# Patient Record
Sex: MALE | Race: WHITE | NOT HISPANIC OR LATINO | Employment: OTHER | URBAN - METROPOLITAN AREA
[De-identification: names, ages, dates, MRNs, and addresses within clinical notes are randomized per-mention and may not be internally consistent; named-entity substitution may affect disease eponyms.]

---

## 2019-01-14 ENCOUNTER — APPOINTMENT (OUTPATIENT)
Dept: RADIOLOGY | Facility: CLINIC | Age: 64
End: 2019-01-14
Payer: COMMERCIAL

## 2019-01-14 ENCOUNTER — OFFICE VISIT (OUTPATIENT)
Dept: OBGYN CLINIC | Facility: CLINIC | Age: 64
End: 2019-01-14
Payer: COMMERCIAL

## 2019-01-14 VITALS
DIASTOLIC BLOOD PRESSURE: 70 MMHG | HEIGHT: 69 IN | BODY MASS INDEX: 31.96 KG/M2 | HEART RATE: 76 BPM | SYSTOLIC BLOOD PRESSURE: 115 MMHG | WEIGHT: 215.8 LBS

## 2019-01-14 DIAGNOSIS — M79.651 PAIN OF RIGHT THIGH: ICD-10-CM

## 2019-01-14 DIAGNOSIS — M77.42 METATARSALGIA OF LEFT FOOT: ICD-10-CM

## 2019-01-14 DIAGNOSIS — M16.11 PRIMARY OSTEOARTHRITIS OF ONE HIP, RIGHT: Primary | ICD-10-CM

## 2019-01-14 PROCEDURE — 73552 X-RAY EXAM OF FEMUR 2/>: CPT

## 2019-01-14 PROCEDURE — 99203 OFFICE O/P NEW LOW 30 MIN: CPT | Performed by: ORTHOPAEDIC SURGERY

## 2019-01-14 NOTE — PROGRESS NOTES
Assessment/Plan:  1  Primary osteoarthritis of one hip, right  Ambulatory referral to Physical Therapy    Ambulatory referral to Orthopedic Surgery   2  Pain of right thigh  CANCELED: XR femur 2 vw left   3  Metatarsalgia of left foot  Ambulatory referral to [facility] Bec[facility]    I,:   Guerita Mattson am acting as a scribe while in the presence of the attending physician :        I,:   Radha Valdez MD personally performed the services described in this documentation    as scribed in my presence :              Katherine West upon examination and review of x-rays today demonstrates severe right hip joint osteoarthritis  He does demonstrate significant restrictions range of motion on examination today  X-rays demonstrates severe arthritic change into the hip joint with large osteophyte formations the inferior aspect of the hip joint  I do believe that he will benefit from physical therapy to help regain range of motion and strength in the hip  X-rays of the knees do demonstrate patellar anchors for the quadriceps fixation  However his knee demonstrates mild arthritic change throughout  In regards to his left foot pain this does appear to be a metatarsalgia as he does demonstrate tenderness to the metatarsal heads of the 1st and 2nd toes  He is seeing were podiatry for this  I did recommend making a pressure relief pad to help alleviate painful symptoms while ambulating  I would like Katherine West to follow up with my partner Dr Kevin Plata in 6 weeks for further consult of his right hip joint osteoarthritis  Subjective:   Jamarcus Shabazz is a 59 y o  male who presents for initial evaluation of his right hip and left foot  He states that his right hip has been bothersome over last several years he notes that he is experiencing intermittent and mild to moderate sharp pain at the lateral aspect of his hip that can radiate distally into the lateral aspect of his upper leg    He does note that he has a history of bilateral quadriceps tendon repair from 2004  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   He states that if he is on his feet for long periods of time he will experience pain that radiate down the lateral aspect of his leg  He also notes sling down and position change also exacerbates symptoms into his right hip  He denies any distal paresthesias  In regards to his left foot he notes that this has been ongoing  over the last 6 weeks  He states that he is experiencing a sharp burning sensation in the plantar aspect between the great toe and 2nd toe  He notes that this is exacerbated with direct pressure or prolonged weight-bearing  He denies any numbness or tingling into his toes  Review of Systems   Constitutional: Negative for chills, fever and unexpected weight change  HENT: Negative for hearing loss, nosebleeds and sore throat  Eyes: Negative for pain, redness and visual disturbance  Respiratory: Negative for cough, shortness of breath and wheezing  Cardiovascular: Negative for chest pain, palpitations and leg swelling  Gastrointestinal: Negative for abdominal pain, nausea and vomiting  Endocrine: Negative for polyphagia and polyuria  Genitourinary: Negative for dysuria and hematuria  Musculoskeletal: Positive for arthralgias  See HPI   Skin: Negative for rash and wound  Neurological: Negative for dizziness, numbness and headaches  Psychiatric/Behavioral: Negative for decreased concentration and suicidal ideas  The patient is not nervous/anxious  No past medical history on file      Past Surgical History:   Procedure Laterality Date    REPAIR QUADRICEPS / HAMSTRING MUSCLE Bilateral 2004       Family History   Problem Relation Age of Onset    Hypertension Mother     Heart failure Mother     Stroke Mother     Diabetes Mother     Hypertension Father     Heart failure Father     Heart attack Father     No Known Problems Sister     No Known Problems Brother     No Known Problems Maternal Aunt     No Known Problems Maternal Uncle     No Known Problems Paternal Aunt     No Known Problems Paternal Uncle     No Known Problems Maternal Grandmother     No Known Problems Maternal Grandfather     No Known Problems Paternal Grandmother     No Known Problems Paternal Grandfather     ADD / ADHD Neg Hx     Anesthesia problems Neg Hx     Cancer Neg Hx     Clotting disorder Neg Hx     Collagen disease Neg Hx     Dislocations Neg Hx     Learning disabilities Neg Hx     Neurological problems Neg Hx     Osteoporosis Neg Hx     Rheumatologic disease Neg Hx     Scoliosis Neg Hx     Vascular Disease Neg Hx        Social History     Occupational History    Not on file  Social History Main Topics    Smoking status: Never Smoker    Smokeless tobacco: Never Used    Alcohol use Yes      Comment: occ  a beer every couple of weeks     Drug use: No    Sexual activity: Not on file       No current outpatient prescriptions on file  Allergies   Allergen Reactions    Aleve [Naproxen] Rash    Penicillins Rash     Reaction Date: [id number];        Objective:  Vitals:    01/14/19 1001   BP: 115/70   Pulse: 76       Right Hip Exam     Tenderness   The patient is experiencing no tenderness  Range of Motion   Flexion: 110 (Hip externally rotates with hip flexion)   Internal Rotation: 25   Right hip external rotation: -15  Abduction: 40   Adduction: 25     Muscle Strength   Abduction: 5/5   Flexion: 5/5           Observations     Additional Observation Details  No gross deformity or swelling of the foot    Tenderness     Additional Tenderness Details  Tenderness to palpation of the left 1st and 2nd metatarsal    Neurological Testing     Sensation     Ankle/Foot   Left Ankle/Foot   Intact: light touch      Physical Exam   Constitutional: He is oriented to person, place, and time   He appears well-developed and well-nourished  HENT:   Head: Normocephalic and atraumatic  Eyes: Conjunctivae are normal  Right eye exhibits no discharge  Left eye exhibits no discharge  Neck: Normal range of motion  Neck supple  Cardiovascular: Normal rate and intact distal pulses  Pulmonary/Chest: Effort normal  No respiratory distress  Musculoskeletal:   As noted in HPI   Neurological: He is alert and oriented to person, place, and time  Skin: Skin is warm and dry  Psychiatric: He has a normal mood and affect  His behavior is normal  Judgment and thought content normal    Nursing note and vitals reviewed  I have personally reviewed pertinent films in PACS and my interpretation is as follows:    X-rays of the right hip and leg demonstrates severe osteoarthritis of the hip joint  There is significant spurring demonstrated in the inferior aspect of the femoral head as well as the inferior aspect of the acetabulum  Joint line is severely narrowed throughout the joint  The right knee demonstrates quadriceps tendon repair fixation anchors into the patella with calcific density demonstrated in the area of the quadriceps tendon superior to the patella  Mild arthritic changes throughout the knee joint demonstrated

## 2019-01-23 ENCOUNTER — EVALUATION (OUTPATIENT)
Dept: PHYSICAL THERAPY | Facility: CLINIC | Age: 64
End: 2019-01-23
Payer: COMMERCIAL

## 2019-01-23 DIAGNOSIS — M16.11 PRIMARY OSTEOARTHRITIS OF ONE HIP, RIGHT: ICD-10-CM

## 2019-01-23 PROCEDURE — G8979 MOBILITY GOAL STATUS: HCPCS | Performed by: PHYSICAL THERAPIST

## 2019-01-23 PROCEDURE — 97161 PT EVAL LOW COMPLEX 20 MIN: CPT | Performed by: PHYSICAL THERAPIST

## 2019-01-23 PROCEDURE — G8978 MOBILITY CURRENT STATUS: HCPCS | Performed by: PHYSICAL THERAPIST

## 2019-01-23 NOTE — PROGRESS NOTES
PT Evaluation     Today's date: 2019  Patient name: Ginger Fitzpatrick  : 1955  MRN: [id number]  Referring provider: Susy Avina MD  Dx:   Encounter Diagnosis     ICD-10-CM    1  Primary osteoarthritis of one hip, right M16 11 Ambulatory referral to Physical Therapy                  Assessment  Assessment details: Fani Dugan with signs and symptoms consistent with Primary osteoarthritis of one hip, right, with loss of range of motion, strength and spinal stabilization  Presents with high reactivity  Ginger Fitzpatrick would benefit with physical therapy to address these impairments to return to prior level of function  Impairments: abnormal or restricted ROM, activity intolerance, impaired physical strength, lacks appropriate home exercise program, pain with function and weight-bearing intolerance    Goals  STG  Initiate HEP  Able to restore 50% ROM in 3 weeks  LTG  Independent with HEP  Restore 75% ROM in  weeks    Plan  Planned therapy interventions: joint mobilization, manual therapy, neuromuscular re-education, patient education, postural training, strengthening, stretching, therapeutic exercise, balance, gait training and home exercise program  Frequency: 2x week  Duration in visits: 12  Duration in weeks: 6  Treatment plan discussed with: patient        Subjective Evaluation    History of Present Illness  Mechanism of injury: Patient reports developing right hip pain about 7 years ago, that has gotten worse, limiting his walking ability  He is limited to about 1/2 mile walking due to hip pain    Recurrent probem    Quality of life: good    Pain  Current pain rating: 3  At best pain rating: 3  At worst pain ratin  Location: right hip  Quality: dull ache and sharp  Relieving factors: rest and change in position  Aggravating factors: walking, running and stair climbing  Progression: worsening    Social Support  Steps to enter house: yes  Stairs in house: yes   Lives in: multiple-level home  Lives with: spouse    Treatments  Current treatment: physical therapy  Patient Goals  Patient goals for therapy: decreased pain, improved balance, increased motion, increased strength, independence with ADLs/IADLs and return to sport/leisure activities          Objective     Active Range of Motion   Left Hip   Flexion: 120 degrees   Extension: 20 degrees   Abduction: 40 degrees   External rotation (90/90): 40 degrees   Internal rotation (90/90): 30 degrees     Right Hip   Flexion: 95 degrees with pain  Extension: 5 degrees with pain  Abduction: 25 degrees with pain  External rotation (90/90): 30 degrees with pain  Internal rotation (90/90): 5 degrees with pain    Strength/Myotome Testing     Left Hip   Planes of Motion   Flexion: 5  Extension: 5  Abduction: 5  External rotation: 5  Internal rotation: 5    Right Hip   Planes of Motion   Flexion: 3  Extension: 3  Abduction: 3  External rotation: 3  Internal rotation: 3-      Flowsheet Rows      Most Recent Value   PT/OT G-Codes   Current Score  46   Projected Score  61   FOTO information reviewed  Yes   Assessment Type  Evaluation   G code set  Mobility: Walking & Moving Around   Mobility: Walking and Moving Around Current Status ()  CK   Mobility: Walking and Moving Around Goal Status ()  CJ          Precautions: Standard    Daily Treatment Diary     Manual                                                                                   Exercise Diary  1/23            Isometric hip ADD Ball Squeeze 10x            Bridge 10x            SDLY Hip ABD 10x            Clam shells 10x            Reverse clamshells 10x                                                                                                                                                                                                                   Modalities

## 2019-01-29 ENCOUNTER — APPOINTMENT (OUTPATIENT)
Dept: PHYSICAL THERAPY | Facility: CLINIC | Age: 64
End: 2019-01-29
Payer: COMMERCIAL

## 2019-01-31 ENCOUNTER — OFFICE VISIT (OUTPATIENT)
Dept: PHYSICAL THERAPY | Facility: CLINIC | Age: 64
End: 2019-01-31
Payer: COMMERCIAL

## 2019-01-31 DIAGNOSIS — M16.11 PRIMARY OSTEOARTHRITIS OF ONE HIP, RIGHT: Primary | ICD-10-CM

## 2019-01-31 PROCEDURE — 97112 NEUROMUSCULAR REEDUCATION: CPT | Performed by: PHYSICAL THERAPIST

## 2019-01-31 NOTE — PROGRESS NOTES
Daily Note     Today's date: 2019  Patient name: Chichi Esquivel  : 1955  MRN: [id number]  Referring provider: Josh Chang MD  Dx:   Encounter Diagnosis     ICD-10-CM    1  Primary osteoarthritis of one hip, right M16 11                   Subjective: I am doing the HEP and feels better  Objective: See treatment diary below      Assessment: Tolerated treatment well  Patient demonstrated fatigue post treatment      Plan: Continue per plan of care          Precautions: Standard    Daily Treatment Diary     Manual                                                                                   Exercise Diary             Isometric hip ADD Ball Squeeze 10x            Bridge 10x            SDLY Hip ABD 10x            Clam shells 10x            Reverse clamshells 10x            Nustep  10 min L1           Neurac supine pelvic lift  2x5           Neurac supine bridge  2x5           Neurac SDLY Hip ABD  2x5           Neurac SDLY Hip ADD  2x5           Hip fl;exor standing stretch  5x                                                                                                                                    Modalities

## 2019-02-05 ENCOUNTER — OFFICE VISIT (OUTPATIENT)
Dept: PHYSICAL THERAPY | Facility: CLINIC | Age: 64
End: 2019-02-05
Payer: COMMERCIAL

## 2019-02-05 DIAGNOSIS — M16.11 PRIMARY OSTEOARTHRITIS OF ONE HIP, RIGHT: Primary | ICD-10-CM

## 2019-02-05 PROCEDURE — 97112 NEUROMUSCULAR REEDUCATION: CPT | Performed by: PHYSICAL THERAPIST

## 2019-02-05 PROCEDURE — 97110 THERAPEUTIC EXERCISES: CPT | Performed by: PHYSICAL THERAPIST

## 2019-02-05 NOTE — PROGRESS NOTES
Daily Note     Today's date: 2019  Patient name: Oliver Chávez  : 1955  MRN: [id number]  Referring provider: Shama Malik MD  Dx:   Encounter Diagnosis     ICD-10-CM    1  Primary osteoarthritis of one hip, right M16 11                   Subjective: My right hip is moving better, but I was very sore after a days work yesterday  Objective: See treatment diary below      Assessment: Tolerated treatment well  Patient demonstrated fatigue post treatment      Plan: Continue per plan of care        Precautions: Standard    Daily Treatment Diary     Manual              Jt Mob lat distraction   Gr4 10x          Jt mob long distraction   Gr4   10x          PB piriformis stretch   10x          Hip Circles w/ strap   2x10                           Exercise Diary            Isometric hip ADD Ball Squeeze 10x            Bridge 10x            SDLY Hip ABD 10x            Clam shells 10x            Reverse clamshells 10x            Nustep  10 min L1           Neurac supine pelvic lift  2x5 2x5          Neurac supine bridge  2x5 2x5          Neurac SDLY Hip ABD  2x5 2x5          Neurac SDLY Hip ADD  2x5 2x5          Hip fl;exor standing stretch  5x                                                                                                                                    Modalities

## 2019-02-07 ENCOUNTER — OFFICE VISIT (OUTPATIENT)
Dept: PHYSICAL THERAPY | Facility: CLINIC | Age: 64
End: 2019-02-07
Payer: COMMERCIAL

## 2019-02-07 DIAGNOSIS — M16.11 PRIMARY OSTEOARTHRITIS OF ONE HIP, RIGHT: Primary | ICD-10-CM

## 2019-02-07 PROCEDURE — 97140 MANUAL THERAPY 1/> REGIONS: CPT | Performed by: PHYSICAL THERAPIST

## 2019-02-07 PROCEDURE — 97112 NEUROMUSCULAR REEDUCATION: CPT | Performed by: PHYSICAL THERAPIST

## 2019-02-11 ENCOUNTER — OFFICE VISIT (OUTPATIENT)
Dept: PHYSICAL THERAPY | Facility: CLINIC | Age: 64
End: 2019-02-11
Payer: COMMERCIAL

## 2019-02-11 DIAGNOSIS — M16.11 PRIMARY OSTEOARTHRITIS OF ONE HIP, RIGHT: Primary | ICD-10-CM

## 2019-02-11 PROCEDURE — 97112 NEUROMUSCULAR REEDUCATION: CPT | Performed by: PHYSICAL THERAPIST

## 2019-02-11 NOTE — PROGRESS NOTES
Daily Note     Today's date: 2019  Patient name: Tomás Ortiz  : 1955  MRN: [id number]  Referring provider: Raphael Meza MD  Dx:   Encounter Diagnosis     ICD-10-CM    1  Primary osteoarthritis of one hip, right M16 11                   Subjective: My hip is moving much better  Objective: See treatment diary below      Assessment: Tolerated treatment well  Patient demonstrated fatigue post treatment and exhibited good technique with therapeutic exercises      Plan: Continue per plan of care        Precautions: Standard    Daily Treatment Diary     Manual            Jt Mob lat distraction   Gr4 10x Gr4  10x Gr 4  10x        Jt mob long distraction   Gr4   10x Gr4  10x Gr 10x        PB piriformis stretch   10x 10x         Hip Circles w/ strap   2x10 20x                          Exercise Diary          Isometric hip ADD Ball Squeeze 10x            Bridge 10x            SDLY Hip ABD 10x            Clam shells 10x            Reverse clamshells 10x            Nustep  10 min L1           Neurac supine pelvic lift  2x5 2x5 2x5 2x5        Neurac supine bridge  2x5 2x5 2x5 2x5        Neurac SDLY Hip ABD  2x5 2x5 2x5 2x5        Neurac SDLY Hip ADD  2x5 2x5 2x5 2x5        Hip fl;exor standing stretch  5x  10x                                                                                                                                  Modalities

## 2019-02-14 ENCOUNTER — OFFICE VISIT (OUTPATIENT)
Dept: PHYSICAL THERAPY | Facility: CLINIC | Age: 64
End: 2019-02-14
Payer: COMMERCIAL

## 2019-02-14 DIAGNOSIS — M16.11 PRIMARY OSTEOARTHRITIS OF ONE HIP, RIGHT: Primary | ICD-10-CM

## 2019-02-14 PROCEDURE — 97140 MANUAL THERAPY 1/> REGIONS: CPT | Performed by: PHYSICAL THERAPIST

## 2019-02-14 PROCEDURE — 97112 NEUROMUSCULAR REEDUCATION: CPT | Performed by: PHYSICAL THERAPIST

## 2019-02-14 NOTE — PROGRESS NOTES
Daily Note     Today's date: 2019  Patient name: Elyn Goodpasture  : 1955  MRN: [id number]  Referring provider: Aster Roberts MD  Dx:   Encounter Diagnosis     ICD-10-CM    1  Primary osteoarthritis of one hip, right M16 11                   Subjective: Overall feeling better  Objective: See treatment diary below      Assessment: Tolerated treatment well  Patient demonstrated fatigue post treatment and exhibited good technique with therapeutic exercises      Plan: Continue per plan of care        Precautions: Standard    Daily Treatment Diary     Manual           Jt Mob lat distraction   Gr4 10x Gr4  10x Gr 4  10x Gr 4  10x       Jt mob long distraction   Gr4   10x Gr4  10x Gr 10x Gr 4   10x       PB piriformis stretch   10x 10x         Hip Circles w/ strap   2x10 20x                          Exercise Diary         Isometric hip ADD Ball Squeeze 10x            Bridge 10x            SDLY Hip ABD 10x            Clam shells 10x            Reverse clamshells 10x            Nustep  10 min L1           Neurac supine pelvic lift  2x5 2x5 2x5 2x5 2x5       Neurac supine bridge  2x5 2x5 2x5 2x5 2x5       Neurac SDLY Hip ABD  2x5 2x5 2x5 2x5 2x5       Neurac SDLY Hip ADD  2x5 2x5 2x5 2x5 2x5       Hip fl;exor standing stretch  5x  10x                                                                                                                                  Modalities

## 2019-02-19 ENCOUNTER — OFFICE VISIT (OUTPATIENT)
Dept: PHYSICAL THERAPY | Facility: CLINIC | Age: 64
End: 2019-02-19
Payer: COMMERCIAL

## 2019-02-19 DIAGNOSIS — M16.11 PRIMARY OSTEOARTHRITIS OF ONE HIP, RIGHT: Primary | ICD-10-CM

## 2019-02-19 PROCEDURE — 97140 MANUAL THERAPY 1/> REGIONS: CPT | Performed by: PHYSICAL THERAPIST

## 2019-02-19 PROCEDURE — 97112 NEUROMUSCULAR REEDUCATION: CPT | Performed by: PHYSICAL THERAPIST

## 2019-02-19 NOTE — PROGRESS NOTES
Daily Note     Today's date: 2019  Patient name: Walter Santamaria  : 1955  MRN: [id number]  Referring provider: Rachel Mack MD  Dx:   Encounter Diagnosis     ICD-10-CM    1  Primary osteoarthritis of one hip, right M16 11                   Subjective: My hip is moving better with less pain  Objective: See treatment diary below      Assessment: Tolerated treatment well  Patient demonstrated fatigue post treatment and exhibited good technique with therapeutic exercises      Plan: Continue per plan of care        Precautions: Standard    Daily Treatment Diary     Manual          Jt Mob lat distraction   Gr4 10x Gr4  10x Gr 4  10x Gr 4  10x Gr 4 15 x      Jt mob long distraction   Gr4   10x Gr4  10x Gr 10x Gr 4   10x Gr 4   15x      PB piriformis stretch   10x 10x         Hip Circles w/ strap   2x10 20x                          Exercise Diary        Isometric hip ADD Ball Squeeze 10x            Bridge 10x            SDLY Hip ABD 10x            Clam shells 10x            Reverse clamshells 10x            Nustep  10 min L1           Neurac supine pelvic lift  2x5 2x5 2x5 2x5 2x5 2x5      Neurac supine bridge  2x5 2x5 2x5 2x5 2x5 2x5      Neurac SDLY Hip ABD  2x5 2x5 2x5 2x5 2x5 2x5      Neurac SDLY Hip ADD  2x5 2x5 2x5 2x5 2x5 2x5      Hip fl;exor standing stretch  5x  10x                                                                                                                                  Modalities

## 2019-02-21 ENCOUNTER — OFFICE VISIT (OUTPATIENT)
Dept: PHYSICAL THERAPY | Facility: CLINIC | Age: 64
End: 2019-02-21
Payer: COMMERCIAL

## 2019-02-21 DIAGNOSIS — M16.11 PRIMARY OSTEOARTHRITIS OF ONE HIP, RIGHT: Primary | ICD-10-CM

## 2019-02-21 PROCEDURE — 97112 NEUROMUSCULAR REEDUCATION: CPT | Performed by: PHYSICAL THERAPIST

## 2019-02-21 PROCEDURE — 97140 MANUAL THERAPY 1/> REGIONS: CPT | Performed by: PHYSICAL THERAPIST

## 2019-02-21 NOTE — PROGRESS NOTES
PT Re-Evaluation     Today's date: 2019  Patient name: Sage Jacome  : 1955  MRN: [id number]  Referring provider: Melani Tubbs MD  Dx:   Encounter Diagnosis     ICD-10-CM    1  Primary osteoarthritis of one hip, right M16 11                   Assessment  Assessment details: The patient has shown good subjective progress with less pain and more active in daily routine  There has been no change of the AROM of the right hip which remains severely limited  He is benefiting with manual stretching and [place] therapy, I recommend that he continue with the present PT  Plan  Planned therapy interventions: joint mobilization, manual therapy, neuromuscular re-education, patient education, strengthening, stretching, therapeutic exercise and home exercise program  Frequency: 2x week  Duration in visits: 12  Duration in weeks: 6  Treatment plan discussed with: patient        Subjective Evaluation    History of Present Illness  Mechanism of injury: I have less pain, moving better          Objective     Active Range of Motion   Left Hip   Flexion: 120 degrees   Extension: 15 degrees   Abduction: 40 degrees   External rotation (90/90): 35 degrees   Internal rotation (90/90): 25 degrees     Right Hip   Flexion: 95 degrees   Extension: 5 degrees   Abduction: 20 degrees   External rotation (90/90): 15 degrees   Internal rotation (90/90): 0 degrees       Flowsheet Rows      Most Recent Value   PT/OT G-Codes   Current Score  58   Projected Score  61          Precautions: Standard    Daily Treatment Diary     Manual         Jt Mob lat distraction   Gr4 10x Gr4  10x Gr 4  10x Gr 4  10x Gr 4 15 x Gr4  15x     Jt mob long distraction   Gr4   10x Gr4  10x Gr 10x Gr 4   10x Gr 4   15x Gr4  15x     PB piriformis stretch   10x 10x         Hip Circles w/ strap   2x10 20x                          Exercise Diary       Isometric hip ADD Ball Squeeze 10x Bridge 10x            SDLY Hip ABD 10x            Clam shells 10x            Reverse clamshells 10x            Nustep  10 min L1           Neurac supine pelvic lift  2x5 2x5 2x5 2x5 2x5 2x5 2x5     Neurac supine bridge  2x5 2x5 2x5 2x5 2x5 2x5 2x5     Neurac SDLY Hip ABD  2x5 2x5 2x5 2x5 2x5 2x5 2x5     Neurac SDLY Hip ADD  2x5 2x5 2x5 2x5 2x5 2x5 2x5     Hip fl;exor standing stretch  5x  10x                                                                                                                                  Modalities

## 2019-02-26 ENCOUNTER — OFFICE VISIT (OUTPATIENT)
Dept: PHYSICAL THERAPY | Facility: CLINIC | Age: 64
End: 2019-02-26
Payer: COMMERCIAL

## 2019-02-26 DIAGNOSIS — M16.11 PRIMARY OSTEOARTHRITIS OF ONE HIP, RIGHT: Primary | ICD-10-CM

## 2019-02-26 PROCEDURE — 97140 MANUAL THERAPY 1/> REGIONS: CPT | Performed by: PHYSICAL THERAPIST

## 2019-02-26 PROCEDURE — 97112 NEUROMUSCULAR REEDUCATION: CPT | Performed by: PHYSICAL THERAPIST

## 2019-02-26 NOTE — PROGRESS NOTES
Daily Note     Today's date: 2019  Patient name: Chichi Esquivel  : 1955  MRN: [id number]  Referring provider: Josh Chang MD  Dx:   Encounter Diagnosis     ICD-10-CM    1  Primary osteoarthritis of one hip, right M16 11                   Subjective: My hip is feeling better with [place], I have returned to bike riding without pain  Objective: See treatment diary below      Assessment: Tolerated treatment well  Patient demonstrated fatigue post treatment and exhibited good technique with therapeutic exercises      Plan: Continue per plan of care          Precautions: Standard    Daily Treatment Diary     Manual        Jt Mob lat distraction   Gr4 10x Gr4  10x Gr 4  10x Gr 4  10x Gr 4 15 x Gr4  15x Gr4  15x    Jt mob long distraction   Gr4   10x Gr4  10x Gr 10x Gr 4   10x Gr 4   15x Gr4  15x Gr4  15x    PB piriformis stretch   10x 10x         Hip Circles w/ strap   2x10 20x                          Exercise Diary      Isometric hip ADD Ball Squeeze 10x            Bridge 10x            SDLY Hip ABD 10x            Clam shells 10x            Reverse clamshells 10x            Nustep  10 min L1           Neurac supine pelvic lift  2x5 2x5 2x5 2x5 2x5 2x5 2x5 2x5    Neurac supine bridge  2x5 2x5 2x5 2x5 2x5 2x5 2x5 2x5    Neurac SDLY Hip ABD  2x5 2x5 2x5 2x5 2x5 2x5 2x5 2x5    Neurac SDLY Hip ADD  2x5 2x5 2x5 2x5 2x5 2x5 2x5 2x5    Hip fl;exor standing stretch  5x  10x                                                                                                                                  Modalities

## 2019-02-27 ENCOUNTER — APPOINTMENT (OUTPATIENT)
Dept: RADIOLOGY | Facility: CLINIC | Age: 64
End: 2019-02-27
Payer: COMMERCIAL

## 2019-02-27 ENCOUNTER — OFFICE VISIT (OUTPATIENT)
Dept: OBGYN CLINIC | Facility: CLINIC | Age: 64
End: 2019-02-27
Payer: COMMERCIAL

## 2019-02-27 VITALS
DIASTOLIC BLOOD PRESSURE: 75 MMHG | BODY MASS INDEX: 32.53 KG/M2 | HEART RATE: 75 BPM | WEIGHT: 219.6 LBS | HEIGHT: 69 IN | SYSTOLIC BLOOD PRESSURE: 126 MMHG

## 2019-02-27 DIAGNOSIS — M25.551 RIGHT HIP PAIN: ICD-10-CM

## 2019-02-27 DIAGNOSIS — M16.11 PRIMARY OSTEOARTHRITIS OF ONE HIP, RIGHT: ICD-10-CM

## 2019-02-27 DIAGNOSIS — M16.11 PRIMARY OSTEOARTHRITIS OF ONE HIP, RIGHT: Primary | ICD-10-CM

## 2019-02-27 PROCEDURE — 99213 OFFICE O/P EST LOW 20 MIN: CPT | Performed by: ORTHOPAEDIC SURGERY

## 2019-02-27 PROCEDURE — 73502 X-RAY EXAM HIP UNI 2-3 VIEWS: CPT

## 2019-02-27 NOTE — PROGRESS NOTES
Assessment/Plan:  1  Primary osteoarthritis of one hip, right  Ambulatory referral to Orthopedic Surgery    XR hip/pelv 2-3 vws right if performed   2  Right hip pain       Tori Munoz is a very pleasant 59year old gentleman presenting with activity related right hip pain  After reviewing his images, exam, and history, he does seem to be slightly symptomatic of his severe end-stage right hip osteoarthritis  We explained that ultimately, he may require a right total hip arthroplasty, and based on his medical history and body habitus, he would be a good candidate for the anterior approach  He will likely need surgery sooner rather than later given severe changes, limitations in motion, obligatory external rotation with flexion, and slight leg length discrepancy on the right  However, he seems to be doing very well with physical therapy and his home exercise program over the past 6 weeks  We explained that a hip replacement is an elective procedure and only will become necessary when his symptoms prevent him from performing activities of daily living are activities of enjoyment  He did express understanding of this  We did also briefly discussed that he would be a candidate for a cortisone injection, but given the severity of his disease he would likely not see significant relief  Therefore, we have encouraged him to continue with analgesics as needed and his home exercise program   He can return to our office on an as-needed basis when these conservative treatments stop providing him sufficient relief  He understands that when he does return, we will likely recommend pursuing the total hip arthroplasty  All of his questions were addressed today  Subjective: Right hip pain    Patient ID: Oliver Chávez is a 59 y o  male  Tori Munoz is a pleasant 26-year-old gentleman presenting today on referral from our colleague, Dr Lesly Jalloh, for subspecialty of joint replacement and reconstruction    He reports that he has had worsening right hip and lateral leg pain over the past 5-6 years  He denies any history of injury or surgery to the right hip  He did work as a  for 32 years and was an avid runner for 30 years  He does have a history of bilateral quadriceps tendon repairs over 14 years ago, and did not have any significant complications  Over the past year, he has noted worsening pain in his right groin and lateral hip radiating down towards his knee with his activities  He did see Dr Mary Estes recently and was referred to physical therapy, which she has been doing for the past 6 weeks  He reports that he actually has seen significant improvement in his symptoms overall  He continues to have limited range of motion of the right hip, but his pain has greatly diminished  He denies any back pain or paresthesias in the right lower extremity  He has never had any cortisone injections and has not use any ambulatory assistive devices for his hip  He does have difficulty with stairs and putting on his shoes and socks in the morning  He does not have diabetes in is not a smoker  Review of Systems   Constitutional: Negative  HENT: Negative  Eyes: Negative  Respiratory: Negative  Cardiovascular: Negative  Gastrointestinal: Negative  Endocrine: Negative  Genitourinary: Negative  Musculoskeletal: Positive for arthralgias, joint swelling and myalgias  Skin: Negative  Allergic/Immunologic: Negative  Neurological: Negative  Hematological: Negative  Psychiatric/Behavioral: Negative  History reviewed  No pertinent past medical history      Past Surgical History:   Procedure Laterality Date    REPAIR QUADRICEPS / HAMSTRING MUSCLE Bilateral 2004       Family History   Problem Relation Age of Onset    Hypertension Mother     Heart failure Mother     Stroke Mother     Diabetes Mother     Hypertension Father     Heart failure Father     Heart attack Father     No Known Problems Sister     No Known Problems Brother     No Known Problems Maternal Aunt     No Known Problems Maternal Uncle     No Known Problems Paternal Aunt     No Known Problems Paternal Uncle     No Known Problems Maternal Grandmother     No Known Problems Maternal Grandfather     No Known Problems Paternal Grandmother     No Known Problems Paternal Grandfather     ADD / ADHD Neg Hx     Anesthesia problems Neg Hx     Cancer Neg Hx     Clotting disorder Neg Hx     Collagen disease Neg Hx     Dislocations Neg Hx     Learning disabilities Neg Hx     Neurological problems Neg Hx     Osteoporosis Neg Hx     Rheumatologic disease Neg Hx     Scoliosis Neg Hx     Vascular Disease Neg Hx        Social History     Occupational History    Not on file   Tobacco Use    Smoking status: Never Smoker    Smokeless tobacco: Never Used   Substance and Sexual Activity    Alcohol use: Yes     Comment: occ  a beer every couple of weeks     Drug use: No    Sexual activity: Not on file       No current outpatient medications on file  Allergies   Allergen Reactions    Aleve [Naproxen] Rash    Penicillins Rash     Reaction Date: [id number];        Objective:  Vitals:    02/27/19 1011   BP: 126/75   Pulse: 75       Body mass index is 32 43 kg/m²  Right Hip Exam     Tenderness   The patient is experiencing no tenderness       Range of Motion   Abduction:  30 abnormal   Adduction:  10 abnormal   Flexion:  100 (obligatory external rotation with hip flexion) abnormal   External rotation:  30 abnormal   Internal rotation:  0 (-5) abnormal     Muscle Strength   Abduction: 5/5   Adduction: 5/5   Flexion: 5/5     Tests   LASHELL: positive  Emily: negative    Other   Erythema: absent  Scars: absent  Sensation: normal  Pulse: present    Comments:  Positive Kadi log roll, LASHELL  Mild leg length discrepancy  Thigh and calf soft and non tender  Grossly NVI  Ambulates with slightly antalgic gait on the right          Observations     Additional Observation Details  No gross deformity or swelling of the foot    Tenderness     Additional Tenderness Details  Tenderness to palpation of the left 1st and 2nd metatarsal    Neurological Testing     Sensation     Ankle/Foot   Left Ankle/Foot   Intact: light touch      Physical Exam   Constitutional: He is oriented to person, place, and time  He appears well-developed and well-nourished  Body mass index is 32 43 kg/m²  HENT:   Head: Normocephalic and atraumatic  Eyes: EOM are normal    Glasses    Neck: Normal range of motion  Cardiovascular: Intact distal pulses  Pulmonary/Chest: Effort normal    Musculoskeletal:   See ortho exam   Neurological: He is alert and oriented to person, place, and time  Skin: Skin is warm and dry  Psychiatric: He has a normal mood and affect  His behavior is normal  Judgment and thought content normal        I have personally reviewed pertinent films in PACS of the weight bearing x-rays taken today of his right hip which demonstrate end stage degenerative changes with complete joint space loss, flattening of the femoral head and neck, large osteophytes, sclerosis, and subchonral cysts  There is no fracture, dislocation, or avascular necrosis

## 2019-02-28 ENCOUNTER — OFFICE VISIT (OUTPATIENT)
Dept: PHYSICAL THERAPY | Facility: CLINIC | Age: 64
End: 2019-02-28
Payer: COMMERCIAL

## 2019-02-28 DIAGNOSIS — M16.11 PRIMARY OSTEOARTHRITIS OF ONE HIP, RIGHT: Primary | ICD-10-CM

## 2019-02-28 PROCEDURE — 97140 MANUAL THERAPY 1/> REGIONS: CPT | Performed by: PHYSICAL THERAPIST

## 2019-02-28 PROCEDURE — 97112 NEUROMUSCULAR REEDUCATION: CPT | Performed by: PHYSICAL THERAPIST

## 2019-02-28 NOTE — PROGRESS NOTES
PT Re-Evaluation     Today's date: 2019  Patient name: Va Ward  : 1955  MRN: [id number]  Referring provider: Lesly Bryant MD  Dx:   Encounter Diagnosis     ICD-10-CM    1  Primary osteoarthritis of one hip, right M16 11                   Assessment  Assessment details: The patient remains with severe limitation of ROM, due to OA  However, he is functioning at a higher level with the [facility] Therapy  He is benefitting with neaurac therapy and would benefit with continued PT to progress right hip function  Impairments: abnormal or restricted ROM, activity intolerance, impaired balance, impaired physical strength, lacks appropriate home exercise program and pain with function    Goals  STG  Able to walk 1/2 mile on level ground without pain in 3 weeks  Able to climb 1 flight of stairs without pain in 3 weeks  LTG  Able to walk 1 mile on level ground without pain in 6 weeks  Able to climb 2 flight of stairs without pain in 6 weeks  Plan  Planned therapy interventions: manual therapy, joint mobilization, neuromuscular re-education, postural training, patient education, strengthening, stretching, functional ROM exercises and gait training  Frequency: 2x week  Duration in visits: 12  Duration in weeks: 6  Treatment plan discussed with: patient        Subjective Evaluation    History of Present Illness  Mechanism of injury: I saw Dr Sangeeta Becerril and felt that I do not need a jt replacement at this time    Pain  Current pain ratin  At best pain ratin  At worst pain ratin          Objective     Active Range of Motion   Left Hip   Flexion: 120 degrees   Abduction: 35 degrees   External rotation (90/90): 35 degrees   Internal rotation (90/90): 20 degrees     Right Hip   Flexion: 90 degrees   Abduction: 15 degrees   External rotation (90/90): 15 degrees   Internal rotation (90/90): 0 degrees     Strength/Myotome Testing     Left Hip   Planes of Motion   Flexion: 5  Extension: 5  Abduction: 5  External rotation: 5  Internal rotation: 5    Right Hip   Planes of Motion   Flexion: 4  Extension: 3+  Abduction: 4-  External rotation: 3-  Internal rotation: 3-        Precautions: Standard    Daily Treatment Diary     Manual    2/5 2/7 2/11 2/14 2/19 2/21 2/26 2/28   Jt Mob lat distraction   Gr4 10x Gr4  10x Gr 4  10x Gr 4  10x Gr 4 15 x Gr4  15x Gr4  15x Gr 4  15x   Jt mob long distraction   Gr4   10x Gr4  10x Gr 10x Gr 4   10x Gr 4   15x Gr4  15x Gr4  15x Gr 4  15x   PB piriformis stretch   10x 10x         Hip Circles w/ strap   2x10 20x                          Exercise Diary  1/23 1/31 2/5 2/7 2/11 2/14 2/19 2/21 2/26 2/28   Isometric hip ADD Ball Squeeze 10x            Bridge 10x            SDLY Hip ABD 10x            Clam shells 10x            Reverse clamshells 10x            Nustep  10 min L1           Neurac supine pelvic lift  2x5 2x5 2x5 2x5 2x5 2x5 2x5 2x5 2x5   Neurac supine bridge  2x5 2x5 2x5 2x5 2x5 2x5 2x5 2x5 2x5   Neurac SDLY Hip ABD  2x5 2x5 2x5 2x5 2x5 2x5 2x5 2x5 2x5   Neurac SDLY Hip ADD  2x5 2x5 2x5 2x5 2x5 2x5 2x5 2x5 2x5   Hip fl;exor standing stretch  5x  10x                                                                                                                                  Modalities

## 2019-03-05 ENCOUNTER — OFFICE VISIT (OUTPATIENT)
Dept: PHYSICAL THERAPY | Facility: CLINIC | Age: 64
End: 2019-03-05
Payer: COMMERCIAL

## 2019-03-05 DIAGNOSIS — M16.11 PRIMARY OSTEOARTHRITIS OF ONE HIP, RIGHT: Primary | ICD-10-CM

## 2019-03-05 PROCEDURE — 97140 MANUAL THERAPY 1/> REGIONS: CPT | Performed by: PHYSICAL THERAPIST

## 2019-03-05 PROCEDURE — 97112 NEUROMUSCULAR REEDUCATION: CPT | Performed by: PHYSICAL THERAPIST

## 2019-03-05 NOTE — PROGRESS NOTES
Daily Note     Today's date: 3/5/2019  Patient name: Ramírez Rascon  : 1955  MRN: [id number]  Referring provider: Nabil Kingsley MD  Dx:   Encounter Diagnosis     ICD-10-CM    1  Primary osteoarthritis of one hip, right M16 11                   Subjective: My hip is sore after snow shovelling      Objective: See treatment diary below      Assessment: Tolerated treatment well  Patient demonstrated fatigue post treatment and exhibited good technique with therapeutic exercises      Plan: Continue per plan of care      Precautions OA Hip    Neurac Daily Treatment Diary     Manual  3/       Jt Mob lateral distraction 10x                                           Exercise Diary  3/5       Supine Pelvic Tilt 2x5       Supine Bridging 2x5       Prone Bridging        Side-lying Hip Abduction 2x5       Side-lying Hip Adduction 2x5                                           Modalities

## 2019-03-07 ENCOUNTER — OFFICE VISIT (OUTPATIENT)
Dept: PHYSICAL THERAPY | Facility: CLINIC | Age: 64
End: 2019-03-07
Payer: COMMERCIAL

## 2019-03-07 DIAGNOSIS — M16.11 PRIMARY OSTEOARTHRITIS OF ONE HIP, RIGHT: Primary | ICD-10-CM

## 2019-03-07 PROCEDURE — G8979 MOBILITY GOAL STATUS: HCPCS | Performed by: PHYSICAL THERAPIST

## 2019-03-07 PROCEDURE — G8980 MOBILITY D/C STATUS: HCPCS | Performed by: PHYSICAL THERAPIST

## 2019-03-07 PROCEDURE — 97112 NEUROMUSCULAR REEDUCATION: CPT | Performed by: PHYSICAL THERAPIST

## 2019-03-07 PROCEDURE — 97140 MANUAL THERAPY 1/> REGIONS: CPT | Performed by: PHYSICAL THERAPIST

## 2019-03-07 NOTE — PROGRESS NOTES
Daily Note     Today's date: 3/7/2019  Patient name: Walter Santamaria  : 1955  MRN: [id number]  Referring provider: Rachel Mack MD  Dx:   Encounter Diagnosis     ICD-10-CM    1  Primary osteoarthritis of one hip, right M16 11                   Subjective: My right hip feels good  Objective: See treatment diary below      Assessment: Tolerated treatment well  Patient demonstrated fatigue post treatment and exhibited good technique with therapeutic exercises      Plan: Continue per plan of care        Neurac Daily Treatment Diary     Manual  3/5 3/5      Jt Mob lateral distraction 10x 10x                                          Exercise Diary  3/5 3/7      Supine Pelvic Tilt 2x5 2x5      Supine Bridging 2x5 2x5      Prone Bridging        Side-lying Hip Abduction 2x5 2x5      Side-lying Hip Adduction 2x5 2x5                                          Modalities

## 2019-03-12 ENCOUNTER — OFFICE VISIT (OUTPATIENT)
Dept: PHYSICAL THERAPY | Facility: CLINIC | Age: 64
End: 2019-03-12
Payer: COMMERCIAL

## 2019-03-12 DIAGNOSIS — M16.11 PRIMARY OSTEOARTHRITIS OF ONE HIP, RIGHT: Primary | ICD-10-CM

## 2019-03-12 PROCEDURE — 97112 NEUROMUSCULAR REEDUCATION: CPT | Performed by: PHYSICAL THERAPIST

## 2019-03-12 NOTE — PROGRESS NOTES
Daily Note     Today's date: 3/12/2019  Patient name: Dyan Brown  : 1955  MRN: [id number]  Referring provider: Derek Briceño MD  Dx:   Encounter Diagnosis     ICD-10-CM    1  Primary osteoarthritis of one hip, right M16 11                   Subjective: My hip feels great after redcord      Objective: See treatment diary below      Assessment: Tolerated treatment well  Patient demonstrated fatigue post treatment and exhibited good technique with therapeutic exercises      Plan: Continue per plan of care  n: Continue per plan of care        Neurac Daily Treatment Diary     Manual  3/5 3/5      Jt Mob lateral distraction 10x 10x                                          Exercise Diary  3/5 3/7 3/12     Supine Pelvic Tilt 2x5 2x5 2x5     Supine Bridging 2x5 2x5 2x5     Prone Bridging        Side-lying Hip Abduction 2x5 2x5 2x5     Side-lying Hip Adduction 2x5 2x5 2x5                                         Modalities

## 2019-03-14 ENCOUNTER — APPOINTMENT (OUTPATIENT)
Dept: PHYSICAL THERAPY | Facility: CLINIC | Age: 64
End: 2019-03-14
Payer: COMMERCIAL

## 2019-03-19 ENCOUNTER — APPOINTMENT (OUTPATIENT)
Dept: PHYSICAL THERAPY | Facility: CLINIC | Age: 64
End: 2019-03-19
Payer: COMMERCIAL

## 2019-03-20 ENCOUNTER — OFFICE VISIT (OUTPATIENT)
Dept: PHYSICAL THERAPY | Facility: CLINIC | Age: 64
End: 2019-03-20
Payer: COMMERCIAL

## 2019-03-20 DIAGNOSIS — M16.11 PRIMARY OSTEOARTHRITIS OF ONE HIP, RIGHT: Primary | ICD-10-CM

## 2019-03-20 PROCEDURE — 97112 NEUROMUSCULAR REEDUCATION: CPT | Performed by: PHYSICAL THERAPIST

## 2019-03-20 NOTE — PROGRESS NOTES
Daily Note     Today's date: 3/20/2019  Patient name: Joy Guerrero  : 1955  MRN: [id number]  Referring provider: Christina Castro MD  Dx:   Encounter Diagnosis     ICD-10-CM    1  Primary osteoarthritis of one hip, right M16 11                   Subjective: My hip is sore, yesterday I was climbing ladders with sheet rock  Objective: See treatment diary below      Assessment: Tolerated treatment well  Patient demonstrated fatigue post treatment and exhibited good technique with therapeutic exercises      Plan: Continue per plan of care        Neurac Daily Treatment Diary     Manual  3/5 3/5      Jt Mob lateral distraction 10x 10x                                          Exercise Diary  3/5 3/7 3/12 3/20    Supine Pelvic Tilt 2x5 2x5 2x5 2x5    Supine Bridging 2x5 2x5 2x5 2x5    Prone Bridging        Side-lying Hip Abduction 2x5 2x5 2x5 2x5    Side-lying Hip Adduction 2x5 2x5 2x5 2x5                                        Modalities

## 2019-03-21 ENCOUNTER — APPOINTMENT (OUTPATIENT)
Dept: PHYSICAL THERAPY | Facility: CLINIC | Age: 64
End: 2019-03-21
Payer: COMMERCIAL

## 2019-03-27 ENCOUNTER — OFFICE VISIT (OUTPATIENT)
Dept: PHYSICAL THERAPY | Facility: CLINIC | Age: 64
End: 2019-03-27
Payer: COMMERCIAL

## 2019-03-27 DIAGNOSIS — M16.11 PRIMARY OSTEOARTHRITIS OF ONE HIP, RIGHT: Primary | ICD-10-CM

## 2019-03-27 PROCEDURE — 97112 NEUROMUSCULAR REEDUCATION: CPT | Performed by: PHYSICAL THERAPIST

## 2019-03-27 NOTE — PROGRESS NOTES
Daily Note     Today's date: 3/27/2019  Patient name: Morena Hampton  : 1955  MRN: [id number]  Referring provider: Ara Camejo MD  Dx:   Encounter Diagnosis     ICD-10-CM    1  Primary osteoarthritis of one hip, right M16 11                   Subjective: Less pain with redcord, moving better  Objective: See treatment diary below      Assessment: Tolerated treatment well  Patient demonstrated fatigue post treatment and exhibited good technique with therapeutic exercises      Plan: Continue per plan of care        Neurac Daily Treatment Diary     Manual  3/5 3/5      Jt Mob lateral distraction 10x 10x                                          Exercise Diary  3/5 3/7 3/12 3/20 3/27   Supine Pelvic Tilt 2x5 2x5 2x5 2x5 2x5   Supine Bridging 2x5 2x5 2x5 2x5 2x5   Prone Bridging        Side-lying Hip Abduction 2x5 2x5 2x5 2x5 2x5   Side-lying Hip Adduction 2x5 2x5 2x5 2x5 2x5                                       Modalities

## 2019-04-02 ENCOUNTER — OFFICE VISIT (OUTPATIENT)
Dept: PHYSICAL THERAPY | Facility: CLINIC | Age: 64
End: 2019-04-02
Payer: COMMERCIAL

## 2019-04-02 DIAGNOSIS — M16.11 PRIMARY OSTEOARTHRITIS OF ONE HIP, RIGHT: Primary | ICD-10-CM

## 2019-04-02 PROCEDURE — 97112 NEUROMUSCULAR REEDUCATION: CPT | Performed by: PHYSICAL THERAPIST

## 2019-04-10 ENCOUNTER — OFFICE VISIT (OUTPATIENT)
Dept: PHYSICAL THERAPY | Facility: CLINIC | Age: 64
End: 2019-04-10
Payer: COMMERCIAL

## 2019-04-10 DIAGNOSIS — M16.11 PRIMARY OSTEOARTHRITIS OF ONE HIP, RIGHT: Primary | ICD-10-CM

## 2019-04-10 PROCEDURE — 97112 NEUROMUSCULAR REEDUCATION: CPT | Performed by: PHYSICAL THERAPIST

## 2019-04-17 ENCOUNTER — OFFICE VISIT (OUTPATIENT)
Dept: PHYSICAL THERAPY | Facility: CLINIC | Age: 64
End: 2019-04-17
Payer: COMMERCIAL

## 2019-04-17 DIAGNOSIS — M16.11 PRIMARY OSTEOARTHRITIS OF ONE HIP, RIGHT: Primary | ICD-10-CM

## 2019-04-17 PROCEDURE — 97112 NEUROMUSCULAR REEDUCATION: CPT | Performed by: PHYSICAL THERAPIST

## 2019-04-24 ENCOUNTER — OFFICE VISIT (OUTPATIENT)
Dept: PHYSICAL THERAPY | Facility: CLINIC | Age: 64
End: 2019-04-24
Payer: COMMERCIAL

## 2019-04-24 DIAGNOSIS — M16.11 PRIMARY OSTEOARTHRITIS OF ONE HIP, RIGHT: Primary | ICD-10-CM

## 2019-04-24 PROCEDURE — 97112 NEUROMUSCULAR REEDUCATION: CPT | Performed by: PHYSICAL THERAPIST

## 2019-04-24 PROCEDURE — 97110 THERAPEUTIC EXERCISES: CPT | Performed by: PHYSICAL THERAPIST

## 2019-05-01 ENCOUNTER — OFFICE VISIT (OUTPATIENT)
Dept: PHYSICAL THERAPY | Facility: CLINIC | Age: 64
End: 2019-05-01
Payer: COMMERCIAL

## 2019-05-01 DIAGNOSIS — M16.11 PRIMARY OSTEOARTHRITIS OF ONE HIP, RIGHT: Primary | ICD-10-CM

## 2019-05-01 PROCEDURE — 97112 NEUROMUSCULAR REEDUCATION: CPT | Performed by: PHYSICAL THERAPIST

## 2019-05-08 ENCOUNTER — OFFICE VISIT (OUTPATIENT)
Dept: PHYSICAL THERAPY | Facility: CLINIC | Age: 64
End: 2019-05-08
Payer: COMMERCIAL

## 2019-05-08 DIAGNOSIS — M16.11 PRIMARY OSTEOARTHRITIS OF ONE HIP, RIGHT: Primary | ICD-10-CM

## 2019-05-08 PROCEDURE — 97112 NEUROMUSCULAR REEDUCATION: CPT | Performed by: PHYSICAL THERAPIST

## 2019-05-15 ENCOUNTER — OFFICE VISIT (OUTPATIENT)
Dept: PHYSICAL THERAPY | Facility: CLINIC | Age: 64
End: 2019-05-15
Payer: COMMERCIAL

## 2019-05-15 DIAGNOSIS — M16.11 PRIMARY OSTEOARTHRITIS OF ONE HIP, RIGHT: Primary | ICD-10-CM

## 2019-05-15 PROCEDURE — 97112 NEUROMUSCULAR REEDUCATION: CPT | Performed by: PHYSICAL THERAPIST

## 2019-05-22 ENCOUNTER — OFFICE VISIT (OUTPATIENT)
Dept: PHYSICAL THERAPY | Facility: CLINIC | Age: 64
End: 2019-05-22
Payer: COMMERCIAL

## 2019-05-22 DIAGNOSIS — M16.11 PRIMARY OSTEOARTHRITIS OF ONE HIP, RIGHT: Primary | ICD-10-CM

## 2019-05-22 PROCEDURE — 97112 NEUROMUSCULAR REEDUCATION: CPT | Performed by: PHYSICAL THERAPIST

## 2019-05-29 ENCOUNTER — OFFICE VISIT (OUTPATIENT)
Dept: PHYSICAL THERAPY | Facility: CLINIC | Age: 64
End: 2019-05-29
Payer: COMMERCIAL

## 2019-05-29 DIAGNOSIS — M16.11 PRIMARY OSTEOARTHRITIS OF ONE HIP, RIGHT: Primary | ICD-10-CM

## 2019-05-29 PROCEDURE — 97112 NEUROMUSCULAR REEDUCATION: CPT | Performed by: PHYSICAL THERAPIST

## 2019-09-12 ENCOUNTER — TELEPHONE (OUTPATIENT)
Dept: FAMILY MEDICINE CLINIC | Facility: CLINIC | Age: 64
End: 2019-09-12

## 2019-09-12 ENCOUNTER — HOSPITAL ENCOUNTER (EMERGENCY)
Facility: HOSPITAL | Age: 64
Discharge: HOME | End: 2019-09-12
Attending: EMERGENCY MEDICINE | Admitting: EMERGENCY MEDICINE
Payer: COMMERCIAL

## 2019-09-12 VITALS
HEART RATE: 70 BPM | WEIGHT: 210.32 LBS | OXYGEN SATURATION: 98 % | DIASTOLIC BLOOD PRESSURE: 77 MMHG | SYSTOLIC BLOOD PRESSURE: 132 MMHG | BODY MASS INDEX: 31.06 KG/M2 | RESPIRATION RATE: 18 BRPM | TEMPERATURE: 99.6 F

## 2019-09-12 DIAGNOSIS — R55 SYNCOPE: Primary | ICD-10-CM

## 2019-09-12 LAB
ALBUMIN SERPL BCP-MCNC: 3.6 G/DL (ref 3.5–5)
ALP SERPL-CCNC: 93 U/L (ref 46–116)
ALT SERPL W P-5'-P-CCNC: 45 U/L (ref 12–78)
ANION GAP SERPL CALCULATED.3IONS-SCNC: 7 MMOL/L (ref 4–13)
AST SERPL W P-5'-P-CCNC: 35 U/L (ref 5–45)
BASOPHILS # BLD AUTO: 0.04 THOUSANDS/ΜL (ref 0–0.1)
BASOPHILS NFR BLD AUTO: 1 % (ref 0–1)
BILIRUB SERPL-MCNC: 0.4 MG/DL (ref 0.2–1)
BUN SERPL-MCNC: 16 MG/DL (ref 5–25)
CALCIUM SERPL-MCNC: 8.2 MG/DL (ref 8.3–10.1)
CHLORIDE SERPL-SCNC: 107 MMOL/L (ref 100–108)
CO2 SERPL-SCNC: 25 MMOL/L (ref 21–32)
CREAT SERPL-MCNC: 0.68 MG/DL (ref 0.6–1.3)
EOSINOPHIL # BLD AUTO: 0.13 THOUSAND/ΜL (ref 0–0.61)
EOSINOPHIL NFR BLD AUTO: 2 % (ref 0–6)
ERYTHROCYTE [DISTWIDTH] IN BLOOD BY AUTOMATED COUNT: 12.5 % (ref 11.6–15.1)
GFR SERPL CREATININE-BSD FRML MDRD: 102 ML/MIN/1.73SQ M
GLUCOSE SERPL-MCNC: 95 MG/DL (ref 65–140)
HCT VFR BLD AUTO: 44.9 % (ref 36.5–49.3)
HGB BLD-MCNC: 15.4 G/DL (ref 12–17)
IMM GRANULOCYTES # BLD AUTO: 0.03 THOUSAND/UL (ref 0–0.2)
IMM GRANULOCYTES NFR BLD AUTO: 0 % (ref 0–2)
LYMPHOCYTES # BLD AUTO: 1.11 THOUSANDS/ΜL (ref 0.6–4.47)
LYMPHOCYTES NFR BLD AUTO: 14 % (ref 14–44)
MAGNESIUM SERPL-MCNC: 2.1 MG/DL (ref 1.6–2.6)
MCH RBC QN AUTO: 32.1 PG (ref 26.8–34.3)
MCHC RBC AUTO-ENTMCNC: 34.3 G/DL (ref 31.4–37.4)
MCV RBC AUTO: 94 FL (ref 82–98)
MONOCYTES # BLD AUTO: 0.73 THOUSAND/ΜL (ref 0.17–1.22)
MONOCYTES NFR BLD AUTO: 9 % (ref 4–12)
NEUTROPHILS # BLD AUTO: 6.18 THOUSANDS/ΜL (ref 1.85–7.62)
NEUTS SEG NFR BLD AUTO: 74 % (ref 43–75)
NRBC BLD AUTO-RTO: 0 /100 WBCS
PLATELET # BLD AUTO: 202 THOUSANDS/UL (ref 149–390)
PMV BLD AUTO: 10.8 FL (ref 8.9–12.7)
POTASSIUM SERPL-SCNC: 3.7 MMOL/L (ref 3.5–5.3)
PROT SERPL-MCNC: 6.6 G/DL (ref 6.4–8.2)
RBC # BLD AUTO: 4.8 MILLION/UL (ref 3.88–5.62)
SODIUM SERPL-SCNC: 139 MMOL/L (ref 136–145)
TROPONIN I SERPL-MCNC: <0.02 NG/ML
WBC # BLD AUTO: 8.22 THOUSAND/UL (ref 4.31–10.16)

## 2019-09-12 PROCEDURE — 93005 ELECTROCARDIOGRAM TRACING: CPT

## 2019-09-12 PROCEDURE — 85025 COMPLETE CBC W/AUTO DIFF WBC: CPT | Performed by: EMERGENCY MEDICINE

## 2019-09-12 PROCEDURE — 80053 COMPREHEN METABOLIC PANEL: CPT | Performed by: EMERGENCY MEDICINE

## 2019-09-12 PROCEDURE — 83735 ASSAY OF MAGNESIUM: CPT | Performed by: EMERGENCY MEDICINE

## 2019-09-12 PROCEDURE — 84484 ASSAY OF TROPONIN QUANT: CPT | Performed by: EMERGENCY MEDICINE

## 2019-09-12 PROCEDURE — 99284 EMERGENCY DEPT VISIT MOD MDM: CPT

## 2019-09-12 PROCEDURE — 36415 COLL VENOUS BLD VENIPUNCTURE: CPT | Performed by: EMERGENCY MEDICINE

## 2019-09-12 NOTE — ED PROVIDER NOTES
History  Chief Complaint   Patient presents with    Syncope     Patient had a syncopal episode around 1400, states was working hard this morning and did not eat lunch  went to [address] [place] [address] and was told to come to the Ed     52-year-old male presents after an episode of syncope few hours ago  He says he was working all morning was walking around never really sat down and felt lightheaded and flushed and then passed out for few seconds per his friend  No seizure-like activity observed no tongue biting no urinary incontinence  Did not his head denies any headache nausea chest pain shortness of breath neck pain or any other symptoms  He did have a prodrome before he passed out  And also skipped lunch  Went to see his family doctor who wanted him evaluated in the ED sent him  Patient has no medical history no sudden cardiac death in the family, no alcohol or drug use        History provided by:  Patient   used: No        None       Past Medical History:   Diagnosis Date    Arthritis        Past Surgical History:   Procedure Laterality Date    REPAIR QUADRICEPS / HAMSTRING MUSCLE Bilateral 2004       Family History   Problem Relation Age of Onset    Hypertension Mother     Heart failure Mother     Stroke Mother     Diabetes Mother     Hypertension Father     Heart failure Father     Heart attack Father     No Known Problems Sister     No Known Problems Brother     No Known Problems Maternal Aunt     No Known Problems Maternal Uncle     No Known Problems Paternal Aunt     No Known Problems Paternal Uncle     No Known Problems Maternal Grandmother     No Known Problems Maternal Grandfather     No Known Problems Paternal Grandmother     No Known Problems Paternal Grandfather     ADD / ADHD Neg Hx     Anesthesia problems Neg Hx     Cancer Neg Hx     Clotting disorder Neg Hx     Collagen disease Neg Hx     Dislocations Neg Hx     Learning disabilities Neg Hx  Neurological problems Neg Hx     Osteoporosis Neg Hx     Rheumatologic disease Neg Hx     Scoliosis Neg Hx     Vascular Disease Neg Hx      I have reviewed and agree with the history as documented  Social History     Tobacco Use    Smoking status: Never Smoker    Smokeless tobacco: Never Used   Substance Use Topics    Alcohol use: Yes     Comment: occ  a beer every couple of weeks     Drug use: No        Review of Systems   All other systems reviewed and are negative  Physical Exam  Physical Exam   Constitutional: He is oriented to person, place, and time  He appears well-developed and well-nourished  HENT:   Head: Normocephalic and atraumatic  Eyes: Pupils are equal, round, and reactive to light  EOM are normal    Neck: Normal range of motion  Neck supple  Cardiovascular: Normal rate and regular rhythm  Pulmonary/Chest: Effort normal and breath sounds normal    Abdominal: Soft  Bowel sounds are normal  He exhibits no distension and no mass  There is no tenderness  There is no rebound and no guarding  No hernia  Musculoskeletal: Normal range of motion  Neurological: He is alert and oriented to person, place, and time  He displays normal reflexes  No cranial nerve deficit or sensory deficit  He exhibits normal muscle tone  Coordination normal    Skin: Skin is warm and dry  Psychiatric: He has a normal mood and affect  Nursing note and vitals reviewed        Vital Signs  ED Triage Vitals [09/12/19 1547]   Temperature Pulse Respirations Blood Pressure SpO2   99 6 °F (37 6 °C) 70 16 125/77 100 %      Temp Source Heart Rate Source Patient Position - Orthostatic VS BP Location FiO2 (%)   Tympanic Monitor Sitting Right arm --      Pain Score       No Pain           Vitals:    09/12/19 1547 09/12/19 1645 09/12/19 1700 09/12/19 1734   BP: 125/77 118/69  132/77   Pulse: 70 68 70    Patient Position - Orthostatic VS: Sitting Lying  Lying         Visual Acuity      ED Medications  Medications - No data to display    Diagnostic Studies  Results Reviewed     Procedure Component Value Units Date/Time    Comprehensive metabolic panel [[id number]]  (Abnormal) Collected:  09/12/19 1705    Lab Status:  Final result Specimen:  Blood from Arm, Left Updated:  09/12/19 1728     Sodium 139 mmol/L      Potassium 3 7 mmol/L      Chloride 107 mmol/L      CO2 25 mmol/L      ANION GAP 7 mmol/L      BUN 16 mg/dL      Creatinine 0 68 mg/dL      Glucose 95 mg/dL      Calcium 8 2 mg/dL      AST 35 U/L      ALT 45 U/L      Alkaline Phosphatase 93 U/L      Total Protein 6 6 g/dL      Albumin 3 6 g/dL      Total Bilirubin 0 40 mg/dL      eGFR 102 ml/min/1 73sq m     Narrative:       [place] guidelines for Chronic Kidney Disease (CKD):     Stage 1 with normal or high GFR (GFR > 90 mL/min/1 73 square meters)    Stage 2 Mild CKD (GFR = 60-89 mL/min/1 73 square meters)    Stage 3A Moderate CKD (GFR = 45-59 mL/min/1 73 square meters)    Stage 3B Moderate CKD (GFR = 30-44 mL/min/1 73 square meters)    Stage 4 Severe CKD (GFR = 15-29 mL/min/1 73 square meters)    Stage 5 End Stage CKD (GFR <15 mL/min/1 73 square meters)  Note: GFR calculation is accurate only with a steady state creatinine    Magnesium [[id number]]  (Normal) Collected:  09/12/19 1705    Lab Status:  Final result Specimen:  Blood from Arm, Left Updated:  09/12/19 1728     Magnesium 2 1 mg/dL     Troponin I [[id number]]  (Normal) Collected:  09/12/19 1609    Lab Status:  Final result Specimen:  Blood from Arm, Left Updated:  09/12/19 1637     Troponin I <0 02 ng/mL     CBC and differential [[id number]] Collected:  09/12/19 1609    Lab Status:  Final result Specimen:  Blood from Arm, Left Updated:  09/12/19 1617     WBC 8 22 Thousand/uL      RBC 4 80 Million/uL      Hemoglobin 15 4 g/dL      Hematocrit 44 9 %      MCV 94 fL      MCH 32 1 pg      MCHC 34 3 g/dL      RDW 12 5 %      MPV 10 8 fL      Platelets 456 Thousands/uL      nRBC 0 /100 WBCs      Neutrophils Relative 74 %      Immat GRANS % 0 %      Lymphocytes Relative 14 %      Monocytes Relative 9 %      Eosinophils Relative 2 %      Basophils Relative 1 %      Neutrophils Absolute 6 18 Thousands/µL      Immature Grans Absolute 0 03 Thousand/uL      Lymphocytes Absolute 1 11 Thousands/µL      Monocytes Absolute 0 73 Thousand/µL      Eosinophils Absolute 0 13 Thousand/µL      Basophils Absolute 0 04 Thousands/µL                  No orders to display              Procedures  ECG 12 Lead Documentation Only  Date/Time: 9/12/2019 4:16 PM  Performed by: George Friday, DO  Authorized by: George Friday, DO     ECG reviewed by me, the ED Provider: yes    Patient location:  ED  Previous ECG:     Previous ECG:  Unavailable    Comparison to cardiac monitor: Yes    Interpretation:     Interpretation: non-specific    Rate:     ECG rate assessment: normal    Rhythm:     Rhythm: sinus rhythm    Ectopy:     Ectopy: none    QRS:     QRS axis:  Normal  Conduction:     Conduction: normal    ST segments:     ST segments:  Non-specific  T waves:     T waves: non-specific             ED Course                               MDM  Number of Diagnoses or Management Options  Syncope:   Diagnosis management comments: Patient evaluated with EKG labs  He was observed in the ED for couple of hours with no recurrence of syncope  Discussed the results and plan with the patient and his wife and they verbalized understanding had no further questions at time of discharge  He had stable vital signs and well-appearing at the time of discharge         Amount and/or Complexity of Data Reviewed  Clinical lab tests: ordered and reviewed  Tests in the radiology section of CPT®: ordered and reviewed  Tests in the medicine section of CPT®: ordered and reviewed    Patient Progress  Patient progress: stable      Disposition  Final diagnoses:   Syncope     Time reflects when diagnosis was documented in both MDM as applicable and the Disposition within this note     Time User Action Codes Description Comment    9/12/2019  5:32 PM Anepu, Nichole Goodrich Add [R55] Syncope       ED Disposition     ED Disposition Condition Date/Time Comment    Discharge Stable Thu Sep 12, 2019  5:32 PM Kelin Goldberg discharge to home/self care  Follow-up Information     Follow up With Specialties Details Why Contact Info Additional Information    [address] [facility] Emergency Department Emergency Medicine  If symptoms worsen [address] [facility]  [phone] [facility], [place], [place], [address]          There are no discharge medications for this patient  No discharge procedures on file      ED Provider  Electronically Signed by           Rachel Joyner DO  09/12/19 2419

## 2019-09-12 NOTE — ED NOTES
Discharge instructions reviewed with patient and wife at length  Continued discussion with patient and wife as to why testing is important why tests are lengthy in nature  Patient and wife state understanding  Discharged to home       Jes Moss RN  09/12/19 6838

## 2019-09-12 NOTE — DISCHARGE INSTRUCTIONS
your syncope is likely related to probably heat exhaustion standing for long periods of time  Please take rest in between new work tried to sit down  Please keep herself hydrated  Please return for any multiple recurrent episodes of passing out especially without any warning to the ED  Also return to the ED if he have any worsening shortness of breath or chest pain  Please follow-up with your family doctor closely

## 2019-09-12 NOTE — TELEPHONE ENCOUNTER
Patient walked in office stating that he just passed out at home  Patient's skin warm and dry  Color good  Patient /80 P-66 and R-20 and Sat% 100  Patient denies being on any medications  Blood sugar is 113  Patient ate breakfast and lunch today  Patient was advised to go to ER for full evaluation   Patient's wife arrived to take him to [facility]

## 2019-09-16 LAB
ATRIAL RATE: 67 BPM
P AXIS: -1 DEGREES
PR INTERVAL: 144 MS
QRS AXIS: 33 DEGREES
QRSD INTERVAL: 80 MS
QT INTERVAL: 392 MS
QTC INTERVAL: 414 MS
T WAVE AXIS: 32 DEGREES
VENTRICULAR RATE: 67 BPM

## 2019-09-16 PROCEDURE — 93010 ELECTROCARDIOGRAM REPORT: CPT | Performed by: INTERNAL MEDICINE

## 2019-09-19 ENCOUNTER — TELEPHONE (OUTPATIENT)
Dept: FAMILY MEDICINE CLINIC | Facility: CLINIC | Age: 64
End: 2019-09-19

## 2019-09-19 NOTE — TELEPHONE ENCOUNTER
Prescription approved per [facility] Refill Protocol.     Spoke with patient to address his concerns from walk in triage encounter on 9/12/19

## 2021-04-28 ENCOUNTER — TELEPHONE (OUTPATIENT)
Dept: ADMINISTRATIVE | Facility: OTHER | Age: 66
End: 2021-04-28

## 2021-04-28 ENCOUNTER — OFFICE VISIT (OUTPATIENT)
Dept: FAMILY MEDICINE CLINIC | Facility: CLINIC | Age: 66
End: 2021-04-28
Payer: COMMERCIAL

## 2021-04-28 VITALS
SYSTOLIC BLOOD PRESSURE: 134 MMHG | HEIGHT: 69 IN | OXYGEN SATURATION: 99 % | HEART RATE: 81 BPM | TEMPERATURE: 98.3 F | WEIGHT: 188.6 LBS | BODY MASS INDEX: 27.93 KG/M2 | DIASTOLIC BLOOD PRESSURE: 82 MMHG | RESPIRATION RATE: 18 BRPM

## 2021-04-28 DIAGNOSIS — Z13.29 SCREENING FOR THYROID DISORDER: ICD-10-CM

## 2021-04-28 DIAGNOSIS — Z13.0 SCREENING, DEFICIENCY ANEMIA, IRON: ICD-10-CM

## 2021-04-28 DIAGNOSIS — E55.9 VITAMIN D DEFICIENCY: ICD-10-CM

## 2021-04-28 DIAGNOSIS — M25.551 PAIN OF BOTH HIP JOINTS: ICD-10-CM

## 2021-04-28 DIAGNOSIS — Z79.899 DRUG THERAPY: ICD-10-CM

## 2021-04-28 DIAGNOSIS — Z13.1 SCREENING FOR DIABETES MELLITUS: ICD-10-CM

## 2021-04-28 DIAGNOSIS — M25.552 PAIN OF BOTH HIP JOINTS: ICD-10-CM

## 2021-04-28 DIAGNOSIS — Z76.89 ENCOUNTER TO ESTABLISH CARE: Primary | ICD-10-CM

## 2021-04-28 DIAGNOSIS — Z13.220 LIPID SCREENING: ICD-10-CM

## 2021-04-28 DIAGNOSIS — Z12.5 PROSTATE CANCER SCREENING: ICD-10-CM

## 2021-04-28 PROCEDURE — 99203 OFFICE O/P NEW LOW 30 MIN: CPT | Performed by: NURSE PRACTITIONER

## 2021-04-28 PROCEDURE — 3008F BODY MASS INDEX DOCD: CPT | Performed by: NURSE PRACTITIONER

## 2021-04-28 PROCEDURE — 1036F TOBACCO NON-USER: CPT | Performed by: NURSE PRACTITIONER

## 2021-04-28 PROCEDURE — 3725F SCREEN DEPRESSION PERFORMED: CPT | Performed by: NURSE PRACTITIONER

## 2021-04-28 NOTE — TELEPHONE ENCOUNTER
----- Message from Letta Dubin, Gabino Muñoz sent at 4/28/2021 10:11 AM EDT -----  04/28/21 10:15 AM    Hello, our patient Jose Juan Cordero has had CRC: Colonoscopy completed/performed  Please assist in updating the patient chart by making an External outreach to Dr Taisha Sultana facility located in [facility]   The date of service is 8/3/2016      Thank you,  Letta Dubin, CRNP   STEVE CARRILLO

## 2021-04-28 NOTE — PROGRESS NOTES
Assessment/Plan:    1  Encounter to establish care    2  Screening for diabetes mellitus  -     Comprehensive metabolic panel; Future    3  Screening, deficiency anemia, iron  -     CBC and Platelet; Future    4  Screening for thyroid disorder  -     TSH, 3rd generation with Free T4 reflex; Future    5  Prostate cancer screening  -     PSA, Total Screen; Future    6  Lipid screening  -     Lipid panel; Future    7  BMI 27 0-27 9,adult    8  Pain of both hip joints  -     Vitamin D 25 hydroxy; Future  -     CBC and Platelet; Future  -     Comprehensive metabolic panel; Future  -     Magnesium    9  Vitamin D deficiency  -     Vitamin D 25 hydroxy; Future    10  Drug therapy  -     Vitamin D 25 hydroxy; Future  -     CBC and Platelet; Future  -     TSH, 3rd generation with Free T4 reflex; Future  -     Lipid panel; Future  -     Comprehensive metabolic panel; Future  -     Magnesium    patient given slip for full labs  Advised return for AWV, lab result review      BMI Counseling: Body mass index is 27 85 kg/m²  The BMI is above normal  Nutrition recommendations include decreasing portion sizes, consuming healthier snacks, moderation in carbohydrate intake and increasing intake of lean protein  Exercise recommendations include exercising 3-5 times per week  Return in about 3 months (around 7/28/2021) for Annual physical      Lengthy visit with records review, problem focused education with lengthy Q&A to pt stated satisfaction  30 minutes spent with >50% time spent in supportive counseling/education  Subjective:      Patient ID: Ed Mann is a 60 y o  male  Chief Complaint   Patient presents with   [address]       59 yo male with no significant PMH except m/s injuries r/t exercise and arthritis sxs (right hip) presents to establish  He was last seen for physical at [facility] 4 years ago    Had been seeing Dr Ly Galeano (at our clinic) for years until he retired    He is due for annual blood work, PE    Other than chronic right hip pain (was evaluated by Dr Obi Partida in past) he has no other c/o    He is active  He has enrolled in healthy eating program and has lost 20 pounds in 3 months  "I feel really well"      The following portions of the patient's history were reviewed and updated as appropriate: allergies, current medications, past family history, past medical history, past social history, past surgical history and problem list     Review of Systems   Constitutional: Negative  Respiratory: Negative  Cardiovascular: Negative  Gastrointestinal: Negative  Musculoskeletal: Positive for arthralgias and gait problem  Neurological: Negative for dizziness, tremors, syncope, weakness and headaches  Psychiatric/Behavioral: Negative  No current outpatient medications on file  No current facility-administered medications for this visit  Objective:    /82 (BP Location: Left arm, Patient Position: Sitting, Cuff Size: Standard)   Pulse 81   Temp 98 3 °F (36 8 °C)   Resp 18   Ht 5' 9" (1 753 m)   Wt 85 5 kg (188 lb 9 6 oz)   SpO2 99%   BMI 27 85 kg/m²        Physical Exam  Vitals signs and nursing note reviewed  Constitutional:       General: He is not in acute distress  Appearance: Normal appearance  HENT:      Head: Normocephalic and atraumatic  Neck:      Thyroid: No thyromegaly  Vascular: No carotid bruit  Cardiovascular:      Rate and Rhythm: Normal rate and regular rhythm  Pulses: Normal pulses  Heart sounds: Normal heart sounds  Pulmonary:      Effort: Pulmonary effort is normal       Breath sounds: Normal breath sounds  Musculoskeletal:      Right lower leg: No edema  Left lower leg: No edema  Lymphadenopathy:      Cervical: No cervical adenopathy  Skin:     Coloration: Skin is not pale  Neurological:      General: No focal deficit present  Mental Status: He is alert   Mental status is at baseline     Psychiatric:         Mood and Affect: Mood normal          Behavior: Behavior normal                 UCHE Colindres

## 2021-04-28 NOTE — TELEPHONE ENCOUNTER
Upon review of the In Basket request and the patient's chart, initial outreach has been made via fax, please see Contacts section for details       Thank you  Memo Soriano

## 2021-04-28 NOTE — LETTER
Procedure Request Form: Colonoscopy      Date Requested: 21  Patient: Cathaleen Drain  Patient : 1955   Referring Provider: Hayde Reach, CRNP        Date of Procedure ______________________________       The above patient has informed us that they have completed their   most recent Colonoscopy at your facility  Please complete   this form and attach all corresponding procedure reports/results  Comments __________________________________________________________  ____________________________________________________________________  ____________________________________________________________________  ____________________________________________________________________    Facility Completing Procedure _________________________________________    Form Completed By (print name) _______________________________________      Signature __________________________________________________________      These reports are needed for  compliance    Please fax this completed form and a copy of the procedure report to our office located at [place] [address] as soon as possible to [phone] attention Francisca Purcell: Phone [phone]    We thank you for your assistance in treating our mutual patient

## 2021-04-29 NOTE — TELEPHONE ENCOUNTER
Upon review of the In Basket request we were able to locate, review, and update the patient chart as requested for CRC: Colonoscopy  Any additional questions or concerns should be emailed to the Practice Liaisons via [email or web address] email, please do not reply via In Basket      Thank you  Anastasia Deutsch

## 2021-06-17 ENCOUNTER — TELEPHONE (OUTPATIENT)
Dept: FAMILY MEDICINE CLINIC | Facility: CLINIC | Age: 66
End: 2021-06-17

## 2021-06-17 LAB
25(OH)D3+25(OH)D2 SERPL-MCNC: 41.8 NG/ML (ref 30–100)
ALBUMIN SERPL-MCNC: 4.3 G/DL (ref 3.8–4.8)
ALBUMIN/GLOB SERPL: 2.2 {RATIO} (ref 1.2–2.2)
ALP SERPL-CCNC: 115 IU/L (ref 48–121)
ALT SERPL-CCNC: 26 IU/L (ref 0–44)
AST SERPL-CCNC: 28 IU/L (ref 0–40)
BASOPHILS # BLD AUTO: 0.1 X10E3/UL (ref 0–0.2)
BASOPHILS NFR BLD AUTO: 1 %
BILIRUB SERPL-MCNC: 0.3 MG/DL (ref 0–1.2)
BUN SERPL-MCNC: 12 MG/DL (ref 8–27)
BUN/CREAT SERPL: 16 (ref 10–24)
CALCIUM SERPL-MCNC: 9.1 MG/DL (ref 8.6–10.2)
CHLORIDE SERPL-SCNC: 104 MMOL/L (ref 96–106)
CHOLEST SERPL-MCNC: 139 MG/DL (ref 100–199)
CO2 SERPL-SCNC: 25 MMOL/L (ref 20–29)
CREAT SERPL-MCNC: 0.77 MG/DL (ref 0.76–1.27)
EOSINOPHIL # BLD AUTO: 0.1 X10E3/UL (ref 0–0.4)
EOSINOPHIL NFR BLD AUTO: 3 %
ERYTHROCYTE [DISTWIDTH] IN BLOOD BY AUTOMATED COUNT: 12.7 % (ref 11.6–15.4)
GLOBULIN SER-MCNC: 2 G/DL (ref 1.5–4.5)
GLUCOSE SERPL-MCNC: 102 MG/DL (ref 65–99)
HCT VFR BLD AUTO: 42 % (ref 37.5–51)
HDLC SERPL-MCNC: 57 MG/DL
HGB BLD-MCNC: 14.5 G/DL (ref 13–17.7)
IMM GRANULOCYTES # BLD: 0 X10E3/UL (ref 0–0.1)
IMM GRANULOCYTES NFR BLD: 0 %
LDLC SERPL CALC-MCNC: 68 MG/DL (ref 0–99)
LYMPHOCYTES # BLD AUTO: 1.3 X10E3/UL (ref 0.7–3.1)
LYMPHOCYTES NFR BLD AUTO: 25 %
MAGNESIUM SERPL-MCNC: 2.2 MG/DL (ref 1.6–2.3)
MCH RBC QN AUTO: 32.7 PG (ref 26.6–33)
MCHC RBC AUTO-ENTMCNC: 34.5 G/DL (ref 31.5–35.7)
MCV RBC AUTO: 95 FL (ref 79–97)
MICRODELETION SYND BLD/T FISH: NORMAL
MONOCYTES # BLD AUTO: 0.6 X10E3/UL (ref 0.1–0.9)
MONOCYTES NFR BLD AUTO: 11 %
NEUTROPHILS # BLD AUTO: 3 X10E3/UL (ref 1.4–7)
NEUTROPHILS NFR BLD AUTO: 60 %
PLATELET # BLD AUTO: 218 X10E3/UL (ref 150–450)
POTASSIUM SERPL-SCNC: 4.9 MMOL/L (ref 3.5–5.2)
PROT SERPL-MCNC: 6.3 G/DL (ref 6–8.5)
PSA SERPL-MCNC: 4.1 NG/ML (ref 0–4)
RBC # BLD AUTO: 4.44 X10E6/UL (ref 4.14–5.8)
SL AMB EGFR AFRICAN AMERICAN: 111 ML/MIN/1.73
SL AMB EGFR NON AFRICAN AMERICAN: 96 ML/MIN/1.73
SL AMB VLDL CHOLESTEROL CALC: 14 MG/DL (ref 5–40)
SODIUM SERPL-SCNC: 139 MMOL/L (ref 134–144)
TRIGL SERPL-MCNC: 71 MG/DL (ref 0–149)
TSH SERPL DL<=0.005 MIU/L-ACNC: 2.6 UIU/ML (ref 0.45–4.5)
WBC # BLD AUTO: 5 X10E3/UL (ref 3.4–10.8)

## 2021-06-17 NOTE — TELEPHONE ENCOUNTER
Please advise labs are overall in acceptable ranges  PSA borderline   Can recheck in 6 months will discuss in detail at next ov

## 2021-07-20 ENCOUNTER — OFFICE VISIT (OUTPATIENT)
Dept: FAMILY MEDICINE CLINIC | Facility: CLINIC | Age: 66
End: 2021-07-20
Payer: COMMERCIAL

## 2021-07-20 VITALS
TEMPERATURE: 98.2 F | WEIGHT: 179.8 LBS | RESPIRATION RATE: 16 BRPM | SYSTOLIC BLOOD PRESSURE: 108 MMHG | DIASTOLIC BLOOD PRESSURE: 70 MMHG | OXYGEN SATURATION: 100 % | HEART RATE: 70 BPM | BODY MASS INDEX: 27.25 KG/M2 | HEIGHT: 68 IN

## 2021-07-20 DIAGNOSIS — Z13.6 SCREENING FOR CARDIOVASCULAR CONDITION: ICD-10-CM

## 2021-07-20 DIAGNOSIS — Z00.00 MEDICARE ANNUAL WELLNESS VISIT, INITIAL: ICD-10-CM

## 2021-07-20 DIAGNOSIS — Z13.6 SCREENING FOR AAA (ABDOMINAL AORTIC ANEURYSM): ICD-10-CM

## 2021-07-20 DIAGNOSIS — Z00.00 WELCOME TO MEDICARE PREVENTIVE VISIT: Primary | ICD-10-CM

## 2021-07-20 PROCEDURE — G0402 INITIAL PREVENTIVE EXAM: HCPCS | Performed by: NURSE PRACTITIONER

## 2021-07-20 PROCEDURE — 3288F FALL RISK ASSESSMENT DOCD: CPT | Performed by: NURSE PRACTITIONER

## 2021-07-20 PROCEDURE — 3008F BODY MASS INDEX DOCD: CPT | Performed by: NURSE PRACTITIONER

## 2021-07-20 PROCEDURE — 1036F TOBACCO NON-USER: CPT | Performed by: NURSE PRACTITIONER

## 2021-07-20 PROCEDURE — 93000 ELECTROCARDIOGRAM COMPLETE: CPT | Performed by: NURSE PRACTITIONER

## 2021-07-20 PROCEDURE — 3725F SCREEN DEPRESSION PERFORMED: CPT | Performed by: NURSE PRACTITIONER

## 2021-07-20 NOTE — PATIENT INSTRUCTIONS

## 2021-07-20 NOTE — PROGRESS NOTES
Assessment and Plan:     Problem List Items Addressed This Visit     None      Visit Diagnoses     Welcome to Medicare preventive visit    -  Primary    Medicare annual wellness visit, initial        Screening for cardiovascular condition        Relevant Orders    POCT ECG    Screening for AAA (abdominal aortic aneurysm)        Relevant Orders    US abdominal aorta screening aaa           Preventive health issues were discussed with patient, and age appropriate screening tests were ordered as noted in patient's After Visit Summary  Personalized health advice and appropriate referrals for health education or preventive services given if needed, as noted in patient's After Visit Summary  History of Present Illness:     [facility]  Patient Care Team:  Dhaval Escalante as PCP - General (Family Medicine)     Review of Systems:     Review of Systems   Constitutional: Negative  Eyes:        Wears glasses   Respiratory: Negative  Cardiovascular: Negative  Musculoskeletal: Positive for arthralgias  Progressive right hip arthritis  Managed by ortho   Neurological: Negative  Psychiatric/Behavioral: Negative  Problem List:     There is no problem list on file for this patient       Past Medical and Surgical History:     Past Medical History:   Diagnosis Date    Arthritis      Past Surgical History:   Procedure Laterality Date    REPAIR QUADRICEPS / HAMSTRING MUSCLE Bilateral 2004      Family History:     Family History   Problem Relation Age of Onset    Hypertension Mother     Heart failure Mother     Stroke Mother     Diabetes Mother     Hypertension Father     Heart failure Father     Heart attack Father     No Known Problems Sister     No Known Problems Brother     No Known Problems Maternal Aunt     No Known Problems Maternal Uncle     No Known Problems Paternal Aunt     No Known Problems Paternal Uncle     No Known Problems Maternal Grandmother     No Known Problems Maternal Grandfather     No Known Problems Paternal Grandmother     No Known Problems Paternal Grandfather     ADD / ADHD Neg Hx     Anesthesia problems Neg Hx     Cancer Neg Hx     Clotting disorder Neg Hx     Collagen disease Neg Hx     Dislocations Neg Hx     Learning disabilities Neg Hx     Neurological problems Neg Hx     Osteoporosis Neg Hx     Rheumatologic disease Neg Hx     Scoliosis Neg Hx     Vascular Disease Neg Hx       Social History:     Social History     Socioeconomic History    Marital status: /Civil Union     Spouse name: None    Number of children: None    Years of education: None    Highest education level: None   Occupational History    None   Tobacco Use    Smoking status: Never Smoker    Smokeless tobacco: Never Used   Substance and Sexual Activity    Alcohol use: Yes     Comment: occ  a beer every couple of weeks     Drug use: No    Sexual activity: None   Other Topics Concern    None   Social History Narrative    None     Social Determinants of Health     Financial Resource Strain:     Difficulty of Paying Living Expenses:    Food Insecurity:     Worried About Running Out of Food in the Last Year:     Ran Out of Food in the Last Year:    Transportation Needs:     Lack of Transportation (Medical):      Lack of Transportation (Non-Medical):    Physical Activity:     Days of Exercise per Week:     Minutes of Exercise per Session:    Stress:     Feeling of Stress :    Social Connections:     Frequency of Communication with Friends and Family:     Frequency of Social Gatherings with Friends and Family:     Attends Cheondo[employer] Services:     Active Member of Clubs or Organizations:     Attends Club or Organization Meetings:     Marital Status:    Intimate Partner Violence:     Fear of Current or Ex-Partner:     Emotionally Abused:     Physically Abused:     Sexually Abused:       Medications and Allergies:     No current outpatient medications on file  No current facility-administered medications for this visit  Allergies   Allergen Reactions    Aleve [Naproxen] Rash    Penicillins Rash     Reaction Date: [id number];       Immunizations:     Immunization History   Administered Date(s) Administered    SARS-CoV-2 / COVID-19 mRNA IM (Moderna) 04/08/2021, 05/06/2021    Td (adult), Unspecified 12/01/2017    Zoster Vaccine Recombinant 03/22/2021      Health Maintenance:         Topic Date Due    HIV Screening  04/30/2023 (Originally 7/8/1970)    Hepatitis C Screening  05/29/2023 (Originally 1955)    Colorectal Cancer Screening  08/03/2026         Topic Date Due    Hepatitis B Vaccine (1 of 3 - Risk 3-dose series) Never done    Pneumococcal Vaccine: 65+ Years (1 of 1 - PPSV23) Never done    Influenza Vaccine (1) 09/01/2021      Medicare Screening Tests and Risk Assessments:     Corey Ruiz is here for his Welcome to Medicare visit  Health Risk Assessment:   Patient rates overall health as very good  Patient feels that their physical health rating is slightly better  Patient is very satisfied with their life  Eyesight was rated as same  Hearing was rated as same  Patient feels that their emotional and mental health rating is same  Patient states they are never, rarely unusually tired/fatigued  Pain experienced in the last 7 days has been some  Patient's pain rating has been 4/10  Patient states that he has experienced no weight loss or gain in last 6 months  Right hip OA    Depression Screening:   PHQ-2 Score: 0      Fall Risk Screening: In the past year, patient has experienced: no history of falling in past year      Home Safety:  Patient does not have trouble with stairs inside or outside of their home  Patient has working smoke alarms and has working carbon monoxide detector  Home safety hazards include: none  Nutrition:   Current diet is Regular, Low Cholesterol and Limited junk food       Medications: Patient is currently taking over-the-counter supplements  OTC medications include: see medication list  Patient is able to manage medications  Activities of Daily Living (ADLs)/Instrumental Activities of Daily Living (IADLs):   Walk and transfer into and out of bed and chair?: Yes  Dress and groom yourself?: Yes    Bathe or shower yourself?: Yes    Feed yourself? Yes  Do your laundry/housekeeping?: Yes  Manage your money, pay your bills and track your expenses?: Yes  Make your own meals?: Yes    Do your own shopping?: Yes    Previous Hospitalizations:   Any hospitalizations or ED visits within the last 12 months?: No      Advance Care Planning:   Living will: No    Durable POA for healthcare: No    Advanced directive: No    Advanced directive counseling given: Yes    Five wishes given: Yes      Cognitive Screening:   Provider or family/friend/caregiver concerned regarding cognition?: No    PREVENTIVE SCREENINGS      Cardiovascular Screening:    General: Screening Current      Diabetes Screening:     General: Screening Current      Colorectal Cancer Screening:     General: Screening Current      Prostate Cancer Screening:    General: Screening Current      Osteoporosis Screening:    General: Screening Not Indicated      Abdominal Aortic Aneurysm (AAA) Screening:    Risk factors include: age between 73-67 yo        General: Risks and Benefits Discussed    Due for: Screening AAA Ultrasound      Lung Cancer Screening:     General: Screening Not Indicated      Hepatitis C Screening:    General: Screening Current    Screening, Brief Intervention, and Referral to Treatment (SBIRT)    Screening  Typical number of drinks in a day: 0  Typical number of drinks in a week: 0  Interpretation: Low risk drinking behavior      Single Item Drug Screening:  How often have you used an illegal drug (including marijuana) or a prescription medication for non-medical reasons in the past year? never    Single Item Drug Screen Score:

## 2021-08-30 ENCOUNTER — TELEPHONE (OUTPATIENT)
Dept: DERMATOLOGY | Facility: CLINIC | Age: 66
End: 2021-08-30

## 2021-08-30 NOTE — TELEPHONE ENCOUNTER
Patient called with concerns that no one had called him back since Wednesday - returned his call and no answer  No voicemail set up so was unable to leave message

## 2021-11-09 ENCOUNTER — OFFICE VISIT (OUTPATIENT)
Dept: DERMATOLOGY | Age: 66
End: 2021-11-09
Payer: COMMERCIAL

## 2021-11-09 VITALS — HEIGHT: 69 IN | WEIGHT: 187.8 LBS | TEMPERATURE: 97.9 F | BODY MASS INDEX: 27.81 KG/M2

## 2021-11-09 DIAGNOSIS — L21.9 SEBORRHEIC DERMATITIS: ICD-10-CM

## 2021-11-09 DIAGNOSIS — D22.60 MULTIPLE BENIGN MELANOCYTIC NEVI OF UPPER EXTREMITY, LOWER EXTREMITY, AND TRUNK: Primary | ICD-10-CM

## 2021-11-09 DIAGNOSIS — D22.70 MULTIPLE BENIGN MELANOCYTIC NEVI OF UPPER EXTREMITY, LOWER EXTREMITY, AND TRUNK: Primary | ICD-10-CM

## 2021-11-09 DIAGNOSIS — D22.5 MULTIPLE BENIGN MELANOCYTIC NEVI OF UPPER EXTREMITY, LOWER EXTREMITY, AND TRUNK: Primary | ICD-10-CM

## 2021-11-09 DIAGNOSIS — L82.1 SEBORRHEIC KERATOSES: ICD-10-CM

## 2021-11-09 PROCEDURE — 99203 OFFICE O/P NEW LOW 30 MIN: CPT | Performed by: DERMATOLOGY

## 2021-11-09 PROCEDURE — 3008F BODY MASS INDEX DOCD: CPT | Performed by: DERMATOLOGY

## 2021-11-09 PROCEDURE — 1036F TOBACCO NON-USER: CPT | Performed by: DERMATOLOGY

## 2021-11-17 ENCOUNTER — HOSPITAL ENCOUNTER (OUTPATIENT)
Dept: RADIOLOGY | Facility: HOSPITAL | Age: 66
Discharge: HOME | End: 2021-11-17
Payer: COMMERCIAL

## 2021-11-17 DIAGNOSIS — Z13.6 SCREENING FOR AAA (ABDOMINAL AORTIC ANEURYSM): ICD-10-CM

## 2021-11-17 PROCEDURE — 76706 US ABDL AORTA SCREEN AAA: CPT

## 2022-07-05 ENCOUNTER — RA CDI HCC (OUTPATIENT)
Dept: OTHER | Facility: HOSPITAL | Age: 67
End: 2022-07-05

## 2022-07-05 NOTE — PROGRESS NOTES
[facility]  coding opportunities       Chart reviewed, no opportunity found:   [address]        Patients Insurance     Medicare Insurance: [employer] Advantage

## 2022-07-11 ENCOUNTER — OFFICE VISIT (OUTPATIENT)
Dept: FAMILY MEDICINE CLINIC | Facility: CLINIC | Age: 67
End: 2022-07-11
Payer: COMMERCIAL

## 2022-07-11 VITALS
DIASTOLIC BLOOD PRESSURE: 72 MMHG | SYSTOLIC BLOOD PRESSURE: 116 MMHG | BODY MASS INDEX: 28.05 KG/M2 | OXYGEN SATURATION: 98 % | WEIGHT: 189.4 LBS | RESPIRATION RATE: 18 BRPM | TEMPERATURE: 98.6 F | HEART RATE: 92 BPM | HEIGHT: 69 IN

## 2022-07-11 DIAGNOSIS — Z01.818 PRE-OP EXAM: Primary | ICD-10-CM

## 2022-07-11 DIAGNOSIS — M16.11 ARTHRITIS OF RIGHT HIP: ICD-10-CM

## 2022-07-11 PROCEDURE — 99495 TRANSJ CARE MGMT MOD F2F 14D: CPT | Performed by: NURSE PRACTITIONER

## 2022-07-11 NOTE — PROGRESS NOTES
[facility] PRE-OPERATIVE EVALUATION  [facility]    NAME: Fany Jasmine  AGE: 78 y o  SEX: male  : 1955     DATE: 2022    [facility] Pre-Operative Evaluation      Chief Complaint: Pre-operative Evaluation     Surgery: Right anterior total hip replacement  Anticipated Date of Surgery: 22  Surgeon: Dr Waldo Whatley      History of Present Illness:     HPI    Anesthesia:  spinal and general  Bleeding Risk: no recent abnormal bleeding, no remote history of abnormal bleeding and no use of Ca-channel blockers  Current Anti-platelet/anticoagulation medication: none    Assessment of Cardiac Risk:  · Denies unstable or severe angina or MI in the last 6 weeks or history of stent placement in the last year   · Denies decompensated heart failure (e g  New onset heart failure, NYHA functional class IV heart failure, or worsening existing heart failure)  · Denies significant arrhythmias such as high grade AV block, symptomatic ventricular arrhythmia, newly recognized ventricular tachycardia, supraventricular tachycardia with resting heart rate >100, or symptomatic bradycardia  · Denies severe heart valve disease including aortic stenosis or symptomatic mitral stenosis     Exercise Capacity:  · Able to walk 4 blocks without symptoms?: Yes  · Able to walk 2 flights without symptoms?: Yes    Prior Anesthesia Reactions: No     Personal history of venous thromboembolic disease? Yes, PE post b/l quad muscle repair     History of steroid use for >2 weeks within last year? No         Review of Systems:     Review of Systems   Constitutional: Negative  Respiratory: Negative  Cardiovascular: Negative  Musculoskeletal: Positive for arthralgias and gait problem  Neurological: Negative for dizziness, tremors, syncope and weakness  All other systems reviewed and are negative        Current Problem List:     Patient Active Problem List   Diagnosis    Seborrheic dermatitis       Allergies: Allergies   Allergen Reactions    Aleve [Naproxen] Rash    Penicillins Rash     Reaction Date: [id number];        Current Medications:     No current outpatient medications on file      Past Medical History:       Past Medical History:   Diagnosis Date    Arthritis         Past Surgical History:   Procedure Laterality Date    REPAIR QUADRICEPS / HAMSTRING MUSCLE Bilateral 2004        Family History   Problem Relation Age of Onset    Hypertension Mother     Heart failure Mother     Stroke Mother     Diabetes Mother     Hypertension Father     Heart failure Father     Heart attack Father     No Known Problems Sister     No Known Problems Brother     No Known Problems Maternal Aunt     No Known Problems Maternal Uncle     No Known Problems Paternal Aunt     No Known Problems Paternal Uncle     No Known Problems Maternal Grandmother     No Known Problems Maternal Grandfather     No Known Problems Paternal Grandmother     No Known Problems Paternal Grandfather     ADD / ADHD Neg Hx     Anesthesia problems Neg Hx     Cancer Neg Hx     Clotting disorder Neg Hx     Collagen disease Neg Hx     Dislocations Neg Hx     Learning disabilities Neg Hx     Neurological problems Neg Hx     Osteoporosis Neg Hx     Rheumatologic disease Neg Hx     Scoliosis Neg Hx     Vascular Disease Neg Hx         Social History     Socioeconomic History    Marital status: /Civil Union     Spouse name: Not on file    Number of children: Not on file    Years of education: Not on file    Highest education level: Not on file   Occupational History    Not on file   Tobacco Use    Smoking status: Never Smoker    Smokeless tobacco: Never Used   Substance and Sexual Activity    Alcohol use: Yes     Comment: occ  a beer every couple of weeks     Drug use: No    Sexual activity: Not on file   Other Topics Concern    Not on file   Social History Narrative    Not on file     Social Determinants of Health     Financial Resource Strain: Not on file   Food Insecurity: Not on file   Transportation Needs: Not on file   Physical Activity: Not on file   Stress: Not on file   Social Connections: Not on file   Intimate Partner Violence: Not on file   Housing Stability: Not on file        Physical Exam:     /72 (BP Location: Left arm, Patient Position: Sitting, Cuff Size: Large)   Pulse 92   Temp 98 6 °F (37 °C)   Resp 18   Ht 5' 9" (1 753 m)   Wt 85 9 kg (189 lb 6 4 oz)   SpO2 98%   BMI 27 97 kg/m²     Physical Exam  Vitals and nursing note reviewed  Constitutional:       General: He is not in acute distress  Appearance: Normal appearance  He is well-developed  HENT:      Head: Normocephalic and atraumatic  Eyes:      Conjunctiva/sclera: Conjunctivae normal       Pupils: Pupils are equal, round, and reactive to light  Neck:      Thyroid: No thyromegaly  Vascular: No carotid bruit  Cardiovascular:      Rate and Rhythm: Normal rate and regular rhythm  Pulses: Normal pulses  Heart sounds: Normal heart sounds  No murmur heard  Pulmonary:      Effort: Pulmonary effort is normal       Breath sounds: Normal breath sounds  Abdominal:      General: Bowel sounds are normal  There is no abdominal bruit  Palpations: Abdomen is soft  Tenderness: There is no abdominal tenderness  Musculoskeletal:      Right lower leg: No edema  Left lower leg: No edema  Lymphadenopathy:      Cervical: No cervical adenopathy  Skin:     General: Skin is warm and dry  Coloration: Skin is not pale  Neurological:      General: No focal deficit present  Mental Status: He is alert  Mental status is at baseline        Comments: Antalgic gait right leg   Psychiatric:         Mood and Affect: Mood normal          Behavior: Behavior normal           Data:     Pre-operative work-up    Laboratory Results: I have personally reviewed the pertinent laboratory results/reports      EKG: not yet performed  seeing cardiologist this week    No results found for this or any previous visit (from the past 672 hour(s))  Assessment & Recommendations:     Problem List Items Addressed This Visit    None     Visit Diagnoses     Pre-op exam    -  Primary    Dr Malathi [facility] 7/18/22    Arthritis of right hip              Pre-Op Evaluation Assessment  78 y o  male with planned surgery: Right anterior total hip replacement  Known risk factors for perioperative complications: None  Current medications which may produce withdrawal symptoms if withheld perioperatively: none  Pre-Op Evaluation Plan  1  Further preoperative workup as follows:   - Cardiac clearance scheduled 7/13/22    2  Medication Management/Recommendations:   - Not applicable, not on any medications    3  Prophylaxis for cardiac events with perioperative beta-blockers: not indicated  4  Recommend post op ASA in setting of PE history (2004)    Clearance  Patient is CLEARED for surgery pending acceptable cardiac clearance        Cyril Hairston, [address] [facility]  [address] [facility] [address]  Phone#  [phone]  Fax#  [phone]

## 2022-07-20 ENCOUNTER — EVALUATION (OUTPATIENT)
Dept: PHYSICAL THERAPY | Facility: CLINIC | Age: 67
End: 2022-07-20
Payer: COMMERCIAL

## 2022-07-20 DIAGNOSIS — M16.11 PRIMARY OSTEOARTHRITIS OF RIGHT HIP: Primary | ICD-10-CM

## 2022-07-20 PROCEDURE — 97161 PT EVAL LOW COMPLEX 20 MIN: CPT | Performed by: PHYSICAL THERAPIST

## 2022-07-20 NOTE — LETTER
2022    John Verdin [address] [place] [address]    Patient: Julia Rees   YOB: 1955   Date of Visit: 2022     Encounter Diagnosis     ICD-10-CM    1  Primary osteoarthritis of right hip  M16 11        Dear Dr Arielle Davidson: Thank you for your recent referral of Julia Rees  Please review the attached evaluation summary from Dorian's recent visit  Please verify that you agree with the plan of care by signing the attached order  If you have any questions or concerns, please do not hesitate to call  I sincerely appreciate the opportunity to share in the care of one of your patients and hope to have another opportunity to work with you in the near future  Sincerely,    Christina Smallwood, PT      Referring Provider:      I certify that I have read the below Plan of Care and certify the need for these services furnished under this plan of treatment while under my care  John Verdin MD  [address] [facility] [place] [address]  Via Fax: [phone]          PT Evaluation     Today's date: 2022  Patient name: Julia Rees  : 1955  MRN: [id number]  Referring provider: Hitesh Bradley, *  Dx:   Encounter Diagnosis     ICD-10-CM    1  Primary osteoarthritis of right hip  M16 11                   Assessment  Assessment details: Sarina Cowden Brandspreschiara with signs and symptoms consistent with Primary osteoarthritis of right hip  (primary encounter diagnosis), with loss of range of motion, strength and spinal stabilization  Presents with high reactivity  Julia Rees would benefit with physical therapy to address these impairments to return to prior level of function      Impairments: abnormal gait, abnormal or restricted ROM, activity intolerance, impaired balance, impaired physical strength, lacks appropriate home exercise program, pain with function and weight-bearing intolerance    Goals  STG  Initiate HEP  Able to perform HEP 2-3 times daily in 3 weeks  LTG  Independent with HEP  Restore normal gait mechanics in 6 weeks  Strength to 4/5 in 6 weeks  TUG < 15 sec in 6 weeks  Gait Speed >  9 m/s in 6 weeks  FOTO > 45 in 6 weeks    Plan  Planned therapy interventions: balance, balance/weight bearing training, stretching, strengthening, patient education, neuromuscular re-education, therapeutic exercise, gait training, home exercise program and functional ROM exercises  Frequency: 3x week  Duration in visits: 12  Duration in weeks: 4  Treatment plan discussed with: patient        Subjective Evaluation    History of Present Illness  Date of surgery: 2022  Mechanism of injury: Patient underwent a THR on right 22, he was released from hospital yesterday and presents for outpatient PT today  Recurrent probem    Quality of life: good    Pain  Current pain ratin  At best pain ratin  At worst pain ratin  Location: right hip  Quality: dull ache, discomfort and pressure  Relieving factors: medications and ice  Aggravating factors: stair climbing, walking and standing    Treatments  Current treatment: physical therapy  Patient Goals  Patient goals for therapy: decreased edema, decreased pain, improved balance, increased motion, increased strength, independence with ADLs/IADLs and return to sport/leisure activities          Objective     Strength/Myotome Testing     Left Hip   Planes of Motion   Flexion: 5  Abduction: 5  External rotation: 5  Internal rotation: 5    Right Hip   Planes of Motion   Flexion: 3-  Abduction: 3-    Ambulation   Weight-Bearing Status   Weight-Bearing Status (Left): full weight bearing   Weight-Bearing Status (Right): weight-bearing as tolerated    Assistive device used: two-wheeled walker    Observational Gait   Gait: antalgic and asymmetric   Decreased walking speed and stride length       Functional Assessment        Single Leg Stance Left: 15 seconds  Right: 1 seconds    Comments  TUG 26 sec  Gait Speed   3m/s      Flowsheet Rows    Flowsheet Row Most Recent Value   PT/OT G-Codes    Current Score 1   Projected Score 45   FOTO information reviewed Yes   G code set Mobility: Walking & Moving Around             Precautions: THR right 7/18/22      Manuals                                                                 Neuro Re-Ed                                                                                                        Ther Ex 7/20            Ankle pumps 10x            Quad sets 10x            Glut sets 10x            Heel slide 10x                                                                Ther Activity                                       Gait Training                                       Modalities

## 2022-07-20 NOTE — PROGRESS NOTES
PT Evaluation     Today's date: 2022  Patient name: Addie Isabel  : 1955  MRN: [id number]  Referring provider: Timmy Guzman, *  Dx:   Encounter Diagnosis     ICD-10-CM    1  Primary osteoarthritis of right hip  M16 11                   Assessment  Assessment details: Laney Varghese with signs and symptoms consistent with Primary osteoarthritis of right hip  (primary encounter diagnosis), with loss of range of motion, strength and spinal stabilization  Presents with high reactivity  Addie Isabel would benefit with physical therapy to address these impairments to return to prior level of function  Impairments: abnormal gait, abnormal or restricted ROM, activity intolerance, impaired balance, impaired physical strength, lacks appropriate home exercise program, pain with function and weight-bearing intolerance    Goals  STG  Initiate HEP  Able to perform HEP 2-3 times daily in 3 weeks  LTG  Independent with HEP  Restore normal gait mechanics in 6 weeks  Strength to 4/5 in 6 weeks  TUG < 15 sec in 6 weeks  Gait Speed >  9 m/s in 6 weeks  FOTO > 45 in 6 weeks    Plan  Planned therapy interventions: balance, balance/weight bearing training, stretching, strengthening, patient education, neuromuscular re-education, therapeutic exercise, gait training, home exercise program and functional ROM exercises  Frequency: 3x week  Duration in visits: 12  Duration in weeks: 4  Treatment plan discussed with: patient        Subjective Evaluation    History of Present Illness  Date of surgery: 2022  Mechanism of injury: Patient underwent a THR on right 22, he was released from hospital yesterday and presents for outpatient PT today            Recurrent probem    Quality of life: good    Pain  Current pain ratin  At best pain ratin  At worst pain ratin  Location: right hip  Quality: dull ache, discomfort and pressure  Relieving factors: medications and ice  Aggravating factors: stair climbing, walking and standing    Treatments  Current treatment: physical therapy  Patient Goals  Patient goals for therapy: decreased edema, decreased pain, improved balance, increased motion, increased strength, independence with ADLs/IADLs and return to sport/leisure activities          Objective     Strength/Myotome Testing     Left Hip   Planes of Motion   Flexion: 5  Abduction: 5  External rotation: 5  Internal rotation: 5    Right Hip   Planes of Motion   Flexion: 3-  Abduction: 3-    Ambulation   Weight-Bearing Status   Weight-Bearing Status (Left): full weight bearing   Weight-Bearing Status (Right): weight-bearing as tolerated    Assistive device used: two-wheeled walker    Observational Gait   Gait: antalgic and asymmetric   Decreased walking speed and stride length  Functional Assessment        Single Leg Stance   Left: 15 seconds  Right: 1 seconds    Comments  TUG 26 sec  Gait Speed   3m/s      Flowsheet Rows    Flowsheet Row Most Recent Value   PT/OT G-Codes    Current Score 1   Projected Score 45   FOTO information reviewed Yes   G code set Mobility: Walking & Moving Around             Precautions: THR right 7/18/22      Manuals                                                                 Neuro Re-Ed                                                                                                        Ther Ex 7/20            Ankle pumps 10x            Quad sets 10x            Glut sets 10x            Heel slide 10x                                                                Ther Activity                                       Gait Training                                       Modalities

## 2022-07-22 ENCOUNTER — OFFICE VISIT (OUTPATIENT)
Dept: PHYSICAL THERAPY | Facility: CLINIC | Age: 67
End: 2022-07-22
Payer: COMMERCIAL

## 2022-07-22 DIAGNOSIS — M16.11 PRIMARY OSTEOARTHRITIS OF RIGHT HIP: Primary | ICD-10-CM

## 2022-07-22 PROCEDURE — 97140 MANUAL THERAPY 1/> REGIONS: CPT

## 2022-07-22 PROCEDURE — 97110 THERAPEUTIC EXERCISES: CPT

## 2022-07-22 NOTE — PROGRESS NOTES
Daily Note     Today's date: 2022  Patient name: Fany Jasmine  : 1955  MRN: [id number]  Referring provider: Allyson Severin, *  Dx:   Encounter Diagnosis     ICD-10-CM    1  Primary osteoarthritis of right hip  M16 11                   Subjective: My hip is feeling better, but my stomach has been upset & some constipation also  Objective: See treatment diary below      Assessment: Tolerated treatment fair  Patient demonstrated fatigue post treatment, exhibited good technique with therapeutic exercises and would benefit from continued PT    Patient arrives ambulating using RW   Patients wife present during session  Plan: Progress treatment as tolerated  4 days post op        Precautions: THR right 22      Manuals  EVAL                 R hip PROM / stretching              Prone R hip stretch x 2 minutes                                     Neuro Re-Ed                                                                                                        Ther Ex             Ankle pumps 10x HEP           Quad sets 10x HEP           Glut sets 10x HEP           Heel slide 10x ------           HR (standing)  20x            LSU/FSU   4" step x 10 reps each            Lateral steps @rail   6 laps            SLR  2x5 reps AAROM            Ther Activity             STS   20" mat 2 x 10 reps            Supine clamshells   RTB x 20 reps            Gait Training  Using SPC 40 ft w/ v/c                                      Modalities               CP @ home

## 2022-07-25 ENCOUNTER — OFFICE VISIT (OUTPATIENT)
Dept: PHYSICAL THERAPY | Facility: CLINIC | Age: 67
End: 2022-07-25
Payer: COMMERCIAL

## 2022-07-25 DIAGNOSIS — M16.11 PRIMARY OSTEOARTHRITIS OF RIGHT HIP: Primary | ICD-10-CM

## 2022-07-25 PROCEDURE — 97110 THERAPEUTIC EXERCISES: CPT

## 2022-07-25 PROCEDURE — 97140 MANUAL THERAPY 1/> REGIONS: CPT

## 2022-07-25 NOTE — PROGRESS NOTES
Daily Note     Today's date: 2022  Patient name: Emma Martinez  : 1955  MRN: [id number]  Referring provider: Tricia Alvarez, *  Dx:   Encounter Diagnosis     ICD-10-CM    1  Primary osteoarthritis of right hip  M16 11        Start Time: 1148          Subjective: I took the hip bandage off & there was some mild drainage  Objective: See treatment diary below      Assessment: Tolerated treatment fair  Patient demonstrated fatigue post treatment and would benefit from continued PT   Patient denied fever or erythema around incision  Advised to call surgeon if incision drainage increased or symptoms worsened  Patient arrives ambulating using RW   Patients wife present during session  Plan: Progress treatment as tolerated         Precautions: THR right 22      Manuals  EVAL                R hip PROM / stretching  perf            Prone R hip stretch x 2 minutes perf x 4 minutes w/ quad stretching                                     Neuro Re-Ed                                                                                           NuStep    10 min L1          Ther Ex             Ankle pumps 10x HEP --          Quad sets 10x HEP           Glut sets 10x HEP           Heel slide 10x ------           HR (standing)  20x  20x          LSU/FSU   4" step x 10 reps each  4" step x 10 reps each           Lateral steps @rail   6 laps  4 laps          SLR  2x5 reps AAROM  3x5 reps AAROM           Ther Activity             STS   20" mat 2 x 10 reps  20" mat 2 x 10 reps           Supine clamshells   RTB x 20 reps  20x RTB           Gait Training  Using SPC 40 ft w/ v/c  40 ft using SPC                                     Modalities               CP @ home CP @ home

## 2022-07-27 ENCOUNTER — OFFICE VISIT (OUTPATIENT)
Dept: FAMILY MEDICINE CLINIC | Facility: CLINIC | Age: 67
End: 2022-07-27
Payer: COMMERCIAL

## 2022-07-27 VITALS
HEART RATE: 84 BPM | HEIGHT: 69 IN | SYSTOLIC BLOOD PRESSURE: 142 MMHG | DIASTOLIC BLOOD PRESSURE: 70 MMHG | BODY MASS INDEX: 30.66 KG/M2 | TEMPERATURE: 98.5 F | WEIGHT: 207 LBS | RESPIRATION RATE: 14 BRPM

## 2022-07-27 DIAGNOSIS — Z96.641 HISTORY OF RIGHT HIP REPLACEMENT: ICD-10-CM

## 2022-07-27 DIAGNOSIS — K59.03 DRUG-INDUCED CONSTIPATION: Primary | ICD-10-CM

## 2022-07-27 DIAGNOSIS — M79.89 LEG SWELLING: ICD-10-CM

## 2022-07-27 PROCEDURE — 1160F RVW MEDS BY RX/DR IN RCRD: CPT | Performed by: STUDENT IN AN ORGANIZED HEALTH CARE EDUCATION/TRAINING PROGRAM

## 2022-07-27 PROCEDURE — 99214 OFFICE O/P EST MOD 30 MIN: CPT | Performed by: STUDENT IN AN ORGANIZED HEALTH CARE EDUCATION/TRAINING PROGRAM

## 2022-07-27 RX ORDER — ASPIRIN 81 MG/1
81 TABLET ORAL 2 TIMES DAILY
COMMUNITY
Start: 2022-07-19 | End: 2022-09-08

## 2022-07-27 RX ORDER — POLYETHYLENE GLYCOL 3350 17 G/17G
17 POWDER, FOR SOLUTION ORAL DAILY
Qty: 8.5 G | Refills: 1 | Status: SHIPPED | OUTPATIENT
Start: 2022-07-27 | End: 2022-09-08

## 2022-07-27 NOTE — PROGRESS NOTES
Clinic Visit Note  Morena Hampton 78 y o  male   MRN: [id number]    Assessment and Plan      Diagnoses and all orders for this visit:    Drug-induced constipation  Plan to use MiraLax 17 mg 3 times daily until BM  Continue take OTC Dulcolax  Recommend Fleet enema x2 for bowel movement  This regimen should produce bowel movement, if not please call back for further recommendations  -     bisacodyl (FLEET) 10 MG/30ML ENEM; Insert 30 mL (10 mg total) into the rectum once for 1 dose  -     polyethylene glycol (GLYCOLAX) 17 GM/SCOOP powder; Take 17 g by mouth daily    History of right hip replacement  Patient doing well, currently at rehab, has follow-up with orthopedic office  Currently on aspirin therapy  Leg swelling  Patient having bilateral lower extremity leg swelling after recent surgery  Leg swelling is minimal, wearing compression stockings  No signs indicating DVT  Patient understands to monitor leg swelling to resolution  If leg swelling or pain develops, please call back office for further evaluation  Other orders  -     aspirin (ECOTRIN LOW STRENGTH) 81 mg EC tablet; Take 81 mg by mouth 2 (two) times a day  -     Discontinue: Polyethylene Glycol 3350 (MIRALAX PO); Take by mouth in the morning      My impressions and treatment recommendations were discussed in detail with the patient who verbalized understanding and had no further questions  Discharge instructions were provided  Subjective     Chief Complaint:  Same-day visit    History of Present Illness:    Patient is a pleasant 36-year-old male accompanied by wife coming in secondary to constipation times 10 days, recent history of right hip replacement, no signs of infection, working with rehab, doing well  Patient did have recent anesthesia, recent opioid medication which most likely caused this constipation  We reviewed bowel hygiene      The following portions of the patient's history were reviewed and updated as appropriate: allergies, current medications, past family history, past medical history, past social history, past surgical history and problem list     REVIEW OF SYSTEMS:  A complete 12-point review of systems is negative other than that noted in the HPI  Review of Systems   Constitutional: Negative for chills and fever  HENT: Negative for ear pain and sore throat  Eyes: Negative for pain and visual disturbance  Respiratory: Negative for cough and shortness of breath  Cardiovascular: Positive for leg swelling  Negative for chest pain and palpitations  Gastrointestinal: Positive for constipation  Negative for abdominal pain, diarrhea, nausea and vomiting  Genitourinary: Negative for dysuria and hematuria  Musculoskeletal: Negative for arthralgias and back pain  Skin: Negative for color change and rash  Neurological: Negative for seizures and syncope  Psychiatric/Behavioral: Negative for agitation, behavioral problems and confusion  All other systems reviewed and are negative          Current Outpatient Medications:     aspirin (ECOTRIN LOW STRENGTH) 81 mg EC tablet, Take 81 mg by mouth 2 (two) times a day, Disp: , Rfl:     bisacodyl (FLEET) 10 MG/30ML ENEM, Insert 30 mL (10 mg total) into the rectum once for 1 dose, Disp: 30 mL, Rfl: 0    polyethylene glycol (GLYCOLAX) 17 GM/SCOOP powder, Take 17 g by mouth daily, Disp: 8 5 g, Rfl: 1  Past Medical History:   Diagnosis Date    Arthritis      Past Surgical History:   Procedure Laterality Date    HIP SURGERY Right 07/18/2022    REPAIR QUADRICEPS / HAMSTRING MUSCLE Bilateral 2004     Social History     Socioeconomic History    Marital status: /Civil Union     Spouse name: Not on file    Number of children: Not on file    Years of education: Not on file    Highest education level: Not on file   Occupational History    Not on file   Tobacco Use    Smoking status: Never Smoker    Smokeless tobacco: Never Used   Substance and Sexual Activity  Alcohol use: Yes     Comment: occ  a beer every couple of weeks     Drug use: No    Sexual activity: Not on file   Other Topics Concern    Not on file   Social History Narrative    Not on file     Social Determinants of Health     Financial Resource Strain: Not on file   Food Insecurity: Not on file   Transportation Needs: Not on file   Physical Activity: Not on file   Stress: Not on file   Social Connections: Not on file   Intimate Partner Violence: Not on file   Housing Stability: Not on file     Family History   Problem Relation Age of Onset    Hypertension Mother     Heart failure Mother     Stroke Mother     Diabetes Mother     Hypertension Father     Heart failure Father     Heart attack Father     No Known Problems Sister     No Known Problems Brother     No Known Problems Maternal Aunt     No Known Problems Maternal Uncle     No Known Problems Paternal Aunt     No Known Problems Paternal Uncle     No Known Problems Maternal Grandmother     No Known Problems Maternal Grandfather     No Known Problems Paternal Grandmother     No Known Problems Paternal Grandfather     ADD / ADHD Neg Hx     Anesthesia problems Neg Hx     Cancer Neg Hx     Clotting disorder Neg Hx     Collagen disease Neg Hx     Dislocations Neg Hx     Learning disabilities Neg Hx     Neurological problems Neg Hx     Osteoporosis Neg Hx     Rheumatologic disease Neg Hx     Scoliosis Neg Hx     Vascular Disease Neg Hx      Allergies   Allergen Reactions    Aleve [Naproxen] Rash    Penicillins Rash     Reaction Date: [id number];        Objective     Vitals:    07/27/22 1123   BP: 142/70   BP Location: Left arm   Patient Position: Sitting   Cuff Size: Standard   Pulse: 84   Resp: 14   Temp: 98 5 °F (36 9 °C)   TempSrc: Temporal   Weight: 93 9 kg (207 lb)   Height: 5' 9" (1 753 m)       Physical Exam:     GENERAL: NAD, pleasant   HEENT:  NC/AT, PERRL, EOMI, no scleral icterus  CARDIAC:  RRR, +S1/S2, no S3/S4 appreciated, no m/g/r  PULMONARY:  CTA B/L, no wheezing/rales/rhonci, non-labored breathing  ABDOMEN:  Soft, NT/ND, no rebound/guarding/rigidity  Extremities:  Symmetrical mild bilateral lower extremity edema, cyanosis, or clubbing  Musculoskeletal:  Full range of motion intact in all extremities, using walker for ambulatory movement  NEUROLOGIC: Grossly intact, no focal deficits  SKIN:  No rashes or erythema noted on exposed skin  Psych: Normal affect, mood stable    ==  PLEASE NOTE:  This encounter was completed utilizing the [employer] Direct Speech Voice Recognition Software  Grammatical errors, random word insertions, pronoun errors and incomplete sentences are occasional consequences of the system due to software limitations, ambient noise and hardware issues  These may be missed by proof reading prior to affixing electronic signature  Any questions or concerns about the content, text or information contained within the body of this dictation should be directly addressed to the physician for clarification  Please do not hesitate to call me directly if you have any any questions or concerns      DO Niraj Ambrose [facility]   [facility]

## 2022-07-28 ENCOUNTER — OFFICE VISIT (OUTPATIENT)
Dept: PHYSICAL THERAPY | Facility: CLINIC | Age: 67
End: 2022-07-28
Payer: COMMERCIAL

## 2022-07-28 DIAGNOSIS — M16.11 PRIMARY OSTEOARTHRITIS OF RIGHT HIP: Primary | ICD-10-CM

## 2022-07-28 PROCEDURE — 97110 THERAPEUTIC EXERCISES: CPT | Performed by: PHYSICAL THERAPIST

## 2022-07-28 PROCEDURE — 97112 NEUROMUSCULAR REEDUCATION: CPT | Performed by: PHYSICAL THERAPIST

## 2022-07-28 NOTE — PROGRESS NOTES
Daily Note     Today's date: 2022  Patient name: Va Ward  : 1955  MRN: [id number]  Referring provider: Umair Chiu, *  Dx:   Encounter Diagnosis     ICD-10-CM    1  Primary osteoarthritis of right hip  M16 11                   Subjective: I am very fatigued, not sleeping      Objective: See treatment diary below      Assessment: Tolerated treatment well  Patient demonstrated fatigue post treatment and exhibited good technique with therapeutic exercises      Plan: Continue per plan of care        Precautions: THR right 22      Manuals  EVAL               R hip PROM / stretching  perf            Prone R hip stretch x 2 minutes perf x 4 minutes w/ quad stretching                                     Neuro Re-Ed                                                                                           NuStep    10 min L1 10m         Ther Ex             Ankle pumps 10x HEP --          Quad sets 10x HEP           Glut sets 10x HEP           Heel slide 10x ------  20x         HR (standing)  20x  20x 20x         LSU/FSU   4" step x 10 reps each  4" step x 10 reps each  6" 20x         Lateral steps @rail   6 laps  4 laps          SLR  2x5 reps AAROM  3x5 reps AAROM           Ther Activity             STS   20" mat 2 x 10 reps  20" mat 2 x 10 reps  20" 20x         Supine clamshells   RTB x 20 reps  20x RTB           Gait Training  Using SPC 40 ft w/ v/c  40 ft using SPC                                     Modalities               CP @ home CP @ home

## 2022-08-01 ENCOUNTER — OFFICE VISIT (OUTPATIENT)
Dept: FAMILY MEDICINE CLINIC | Facility: CLINIC | Age: 67
End: 2022-08-01
Payer: COMMERCIAL

## 2022-08-01 ENCOUNTER — OFFICE VISIT (OUTPATIENT)
Dept: PHYSICAL THERAPY | Facility: CLINIC | Age: 67
End: 2022-08-01
Payer: COMMERCIAL

## 2022-08-01 VITALS
TEMPERATURE: 98.1 F | HEIGHT: 69 IN | HEART RATE: 84 BPM | BODY MASS INDEX: 30.21 KG/M2 | SYSTOLIC BLOOD PRESSURE: 122 MMHG | DIASTOLIC BLOOD PRESSURE: 70 MMHG | RESPIRATION RATE: 16 BRPM | WEIGHT: 204 LBS

## 2022-08-01 DIAGNOSIS — M79.89 LEG SWELLING: ICD-10-CM

## 2022-08-01 DIAGNOSIS — Z96.641 HISTORY OF RIGHT HIP REPLACEMENT: ICD-10-CM

## 2022-08-01 DIAGNOSIS — K59.03 DRUG-INDUCED CONSTIPATION: ICD-10-CM

## 2022-08-01 DIAGNOSIS — M16.11 PRIMARY OSTEOARTHRITIS OF RIGHT HIP: Primary | ICD-10-CM

## 2022-08-01 DIAGNOSIS — Z00.00 MEDICARE ANNUAL WELLNESS VISIT, SUBSEQUENT: Primary | ICD-10-CM

## 2022-08-01 PROCEDURE — 97112 NEUROMUSCULAR REEDUCATION: CPT | Performed by: PHYSICAL MEDICINE & REHABILITATION

## 2022-08-01 PROCEDURE — 97110 THERAPEUTIC EXERCISES: CPT

## 2022-08-01 PROCEDURE — 1003F LEVEL OF ACTIVITY ASSESS: CPT | Performed by: STUDENT IN AN ORGANIZED HEALTH CARE EDUCATION/TRAINING PROGRAM

## 2022-08-01 PROCEDURE — 97110 THERAPEUTIC EXERCISES: CPT | Performed by: PHYSICAL MEDICINE & REHABILITATION

## 2022-08-01 PROCEDURE — 3725F SCREEN DEPRESSION PERFORMED: CPT | Performed by: STUDENT IN AN ORGANIZED HEALTH CARE EDUCATION/TRAINING PROGRAM

## 2022-08-01 PROCEDURE — 1101F PT FALLS ASSESS-DOCD LE1/YR: CPT | Performed by: STUDENT IN AN ORGANIZED HEALTH CARE EDUCATION/TRAINING PROGRAM

## 2022-08-01 PROCEDURE — G0439 PPPS, SUBSEQ VISIT: HCPCS | Performed by: STUDENT IN AN ORGANIZED HEALTH CARE EDUCATION/TRAINING PROGRAM

## 2022-08-01 PROCEDURE — 97112 NEUROMUSCULAR REEDUCATION: CPT

## 2022-08-01 PROCEDURE — 1160F RVW MEDS BY RX/DR IN RCRD: CPT | Performed by: STUDENT IN AN ORGANIZED HEALTH CARE EDUCATION/TRAINING PROGRAM

## 2022-08-01 PROCEDURE — 3288F FALL RISK ASSESSMENT DOCD: CPT | Performed by: STUDENT IN AN ORGANIZED HEALTH CARE EDUCATION/TRAINING PROGRAM

## 2022-08-01 PROCEDURE — 1170F FXNL STATUS ASSESSED: CPT | Performed by: STUDENT IN AN ORGANIZED HEALTH CARE EDUCATION/TRAINING PROGRAM

## 2022-08-01 PROCEDURE — 1125F AMNT PAIN NOTED PAIN PRSNT: CPT | Performed by: STUDENT IN AN ORGANIZED HEALTH CARE EDUCATION/TRAINING PROGRAM

## 2022-08-01 PROCEDURE — 99214 OFFICE O/P EST MOD 30 MIN: CPT | Performed by: STUDENT IN AN ORGANIZED HEALTH CARE EDUCATION/TRAINING PROGRAM

## 2022-08-01 NOTE — PROGRESS NOTES
Assessment and Plan:     Problem List Items Addressed This Visit        Digestive    Drug-induced constipation       Other    Leg swelling    Relevant Orders    NT-BNP PRO    Medicare annual wellness visit, subsequent - Primary    Relevant Orders    PSA Total, Diagnostic    CBC and differential    Comprehensive metabolic panel    Lipid panel    Hemoglobin A1C    TSH, 3rd generation with Free T4 reflex    History of right hip replacement        Constipation resolving, continue to use MiraLax as needed for relief    In regards to leg swelling continue compression stocking, no concerns for DVT, no signs of heart failure  Appears to be from previous surgery, continue walking, physical therapy exercises  If swelling not improving will obtain echocardiogram     Pneumonia vaccine to be offered next visit  Health care gaps discussed  Continue physical therapy, follow-up with orthopedics right hip replacement    Follow-up labs next visit    BMI Counseling: Body mass index is 30 13 kg/m²  The BMI is above normal  Nutrition recommendations include decreasing portion sizes, encouraging healthy choices of fruits and vegetables, limiting drinks that contain sugar and moderation in carbohydrate intake  Exercise recommendations include exercising 3-5 times per week  Patient referred to PCP  Rationale for BMI follow-up plan is due to patient being overweight or obese  Continue physical therapy, will integrate regular exercise after completion of rehab from hip surgery  Depression Screening and Follow-up Plan: Patient was screened for depression during today's encounter  They screened negative with a PHQ-2 score of 0  Preventive health issues were discussed with patient, and age appropriate screening tests were ordered as noted in patient's After Visit Summary    Personalized health advice and appropriate referrals for health education or preventive services given if needed, as noted in patient's After Visit Summary  History of Present Illness:     Patient presents for a Medicare Wellness Visit    Patient is seen and examined today in the office, accompanied by wife  He is doing well post hip surgery, constipation is improving, still having some bilateral leg swelling that appears to be stable  Patient Care Team:  Romel Brandt DO as PCP - General (Internal Medicine)     Review of Systems:     Review of Systems   Constitutional: Negative for chills and fever  HENT: Negative for ear pain and sore throat  Eyes: Negative for pain and visual disturbance  Respiratory: Negative for cough and shortness of breath  Cardiovascular: Positive for leg swelling  Negative for chest pain and palpitations  Gastrointestinal: Negative for abdominal pain and vomiting  Genitourinary: Negative for dysuria and hematuria  Musculoskeletal: Negative for arthralgias and back pain  Skin: Negative for color change and rash  Neurological: Negative for seizures and syncope  Psychiatric/Behavioral: Negative for agitation, behavioral problems and confusion  All other systems reviewed and are negative         Problem List:     Patient Active Problem List   Diagnosis    Seborrheic dermatitis    Leg swelling    Medicare annual wellness visit, subsequent    Drug-induced constipation    History of right hip replacement      Past Medical and Surgical History:     Past Medical History:   Diagnosis Date    Arthritis      Past Surgical History:   Procedure Laterality Date    HIP SURGERY Right 07/18/2022    REPAIR QUADRICEPS / HAMSTRING MUSCLE Bilateral 2004      Family History:     Family History   Problem Relation Age of Onset    Hypertension Mother     Heart failure Mother     Stroke Mother     Diabetes Mother     Hypertension Father     Heart failure Father     Heart attack Father     No Known Problems Sister     No Known Problems Brother     No Known Problems Maternal Aunt     No Known Problems Maternal Uncle     No Known Problems Paternal Aunt     No Known Problems Paternal Uncle     No Known Problems Maternal Grandmother     No Known Problems Maternal Grandfather     No Known Problems Paternal Grandmother     No Known Problems Paternal Grandfather     ADD / ADHD Neg Hx     Anesthesia problems Neg Hx     Cancer Neg Hx     Clotting disorder Neg Hx     Collagen disease Neg Hx     Dislocations Neg Hx     Learning disabilities Neg Hx     Neurological problems Neg Hx     Osteoporosis Neg Hx     Rheumatologic disease Neg Hx     Scoliosis Neg Hx     Vascular Disease Neg Hx       Social History:     Social History     Socioeconomic History    Marital status: /Civil Union     Spouse name: None    Number of children: None    Years of education: None    Highest education level: None   Occupational History    None   Tobacco Use    Smoking status: Never Smoker    Smokeless tobacco: Never Used   Substance and Sexual Activity    Alcohol use: Yes     Comment: occ  a beer every couple of weeks     Drug use: No    Sexual activity: None   Other Topics Concern    None   Social History Narrative    None     Social Determinants of Health     Financial Resource Strain: Not on file   Food Insecurity: Not on file   Transportation Needs: Not on file   Physical Activity: Not on file   Stress: Not on file   Social Connections: Not on file   Intimate Partner Violence: Not on file   Housing Stability: Not on file      Medications and Allergies:     Current Outpatient Medications   Medication Sig Dispense Refill    aspirin (ECOTRIN LOW STRENGTH) 81 mg EC tablet Take 81 mg by mouth 2 (two) times a day      polyethylene glycol (GLYCOLAX) 17 GM/SCOOP powder Take 17 g by mouth daily 8 5 g 1     No current facility-administered medications for this visit       Allergies   Allergen Reactions    Aleve [Naproxen] Rash    Penicillins Rash     Reaction Date: [id number];       Immunizations:     Immunization History   Administered Date(s) Administered    COVID-19 MODERNA VACC 0 5 ML IM 04/08/2021, 05/06/2021    Td (adult), Unspecified 12/01/2017    Zoster Vaccine Recombinant 03/22/2021      Health Maintenance:         Topic Date Due    Hepatitis C Screening  05/29/2023 (Originally 1955)    Colorectal Cancer Screening  08/03/2026         Topic Date Due    Hepatitis A Vaccine (1 of 2 - Risk 2-dose series) Never done    Hepatitis B Vaccine (1 of 3 - Risk 3-dose series) Never done    Pneumococcal Vaccine: 65+ Years (1 - PCV) Never done    COVID-19 Vaccine (3 - Booster for Moderna series) 10/06/2021    Influenza Vaccine (1) 09/01/2022      Medicare Screening Tests and Risk Assessments:     Brian López is here for his Subsequent Wellness visit  Last Medicare Wellness visit information reviewed, patient interviewed and updates made to the record today  Health Risk Assessment:   Patient rates overall health as excellent  Patient feels that their physical health rating is much better  Patient is very satisfied with their life  Eyesight was rated as same  Hearing was rated as same  Patient feels that their emotional and mental health rating is much better  Patients states they are never, rarely angry  Patient states they are never, rarely unusually tired/fatigued  Pain experienced in the last 7 days has been none  Patient states that he has experienced no weight loss or gain in last 6 months  Depression Screening:   PHQ-2 Score: 0      Fall Risk Screening: In the past year, patient has experienced: no history of falling in past year      Home Safety:  Patient does not have trouble with stairs inside or outside of their home  Patient has working smoke alarms and has working carbon monoxide detector  Home safety hazards include: none  Nutrition:   Current diet is Regular  Medications:   Patient is not currently taking any over-the-counter supplements  Patient is able to manage medications       Activities of Daily Living (ADLs)/Instrumental Activities of Daily Living (IADLs):   Walk and transfer into and out of bed and chair?: Yes  Dress and groom yourself?: Yes    Bathe or shower yourself?: Yes    Feed yourself? Yes  Do your laundry/housekeeping?: Yes  Manage your money, pay your bills and track your expenses?: Yes  Make your own meals?: Yes    Do your own shopping?: Yes    Previous Hospitalizations:   Any hospitalizations or ED visits within the last 12 months?: Yes      Advance Care Planning:   Living will: Yes    Durable POA for healthcare: No    Advanced directive: Yes      PREVENTIVE SCREENINGS      Cardiovascular Screening:    General: Screening Current      Diabetes Screening:     General: Risks and Benefits Discussed      Colorectal Cancer Screening:     General: Screening Current      Prostate Cancer Screening:    General: Risks and Benefits Discussed    Due for: PSA      Osteoporosis Screening:    General: Risks and Benefits Discussed      Abdominal Aortic Aneurysm (AAA) Screening:    Risk factors include: age between 73-67 yo        Lung Cancer Screening:     General: Screening Not Indicated      Hepatitis C Screening:    General: Screening Current    Screening, Brief Intervention, and Referral to Treatment (SBIRT)    Screening  Typical number of drinks in a day: 0  Typical number of drinks in a week: 0  Interpretation: Low risk drinking behavior      Single Item Drug Screening:  How often have you used an illegal drug (including marijuana) or a prescription medication for non-medical reasons in the past year? never    Single Item Drug Screen Score: 0  Interpretation: Negative screen for possible drug use disorder    No exam data present     Physical Exam:     /70 (BP Location: Left arm, Patient Position: Sitting, Cuff Size: Standard)   Pulse 84   Temp 98 1 °F (36 7 °C) (Temporal)   Resp 16   Ht 5' 9" (1 753 m)   Wt 92 5 kg (204 lb)   BMI 30 13 kg/m²     Physical Exam  Vitals and nursing note reviewed  Constitutional:       General: He is not in acute distress  Appearance: He is well-developed  He is not toxic-appearing  HENT:      Head: Normocephalic and atraumatic  Eyes:      Conjunctiva/sclera: Conjunctivae normal    Cardiovascular:      Rate and Rhythm: Normal rate and regular rhythm  Heart sounds: No murmur heard  Pulmonary:      Effort: Pulmonary effort is normal  No respiratory distress  Breath sounds: Normal breath sounds  Abdominal:      Palpations: Abdomen is soft  Tenderness: There is no abdominal tenderness  Musculoskeletal:         General: Swelling present  Normal range of motion  Cervical back: Neck supple  Skin:     General: Skin is warm and dry  Capillary Refill: Capillary refill takes less than 2 seconds  Neurological:      General: No focal deficit present  Mental Status: He is alert  Mental status is at baseline     Psychiatric:         Mood and Affect: Mood normal          Behavior: Behavior normal           Gerlene Rushing, DO

## 2022-08-01 NOTE — PROGRESS NOTES
Daily Note     Today's date: 2022  Patient name: Dolores Ibrahim  : 1955  MRN: [id number]  Referring provider: Annamarie Mcduffie, *  Dx:   Encounter Diagnosis     ICD-10-CM    1  Primary osteoarthritis of right hip  M16 11                   Subjective: Patient reports today he is feeling much much better  Patient states that he has been sleeping better  Objective: See treatment diary below      Assessment: Tolerated treatment well  Patient demonstrated fatigue post treatment and exhibited good technique with therapeutic exercises      Plan: Continue per plan of care        Precautions: THR right 22      Manuals  EVAL              R hip PROM / stretching  perf  perf          Prone R hip stretch x 2 minutes perf x 4 minutes w/ quad stretching   perf x 4 minutes w/ quad stretching                                   Neuro Re-Ed                                                                                           NuStep    10 min L1 10m 10 min lvl 3        Ther Ex             Ankle pumps 10x HEP --          Quad sets 10x HEP           Glut sets 10x HEP           Heel slide 10x ------  20x 20x        HR (standing)  20x  20x 20x 20x        LSU/FSU   4" step x 10 reps each  4" step x 10 reps each  6" 20x 6" 20x leading with R        Lateral steps @rail   6 laps  4 laps  4 laps        SLR  2x5 reps AAROM  3x5 reps AAROM   3x5 reps AAROM        Ther Activity             STS   20" mat 2 x 10 reps  20" mat 2 x 10 reps  20" 20x 20" 20x        Supine clamshells   RTB x 20 reps  20x RTB           Gait Training  Using SPC 40 ft w/ v/c  40 ft using SPC                                     Modalities               CP @ home CP @ home

## 2022-08-01 NOTE — PATIENT INSTRUCTIONS
Medicare Preventive Visit Patient Instructions  Thank you for completing your Welcome to Medicare Visit or Medicare Annual Wellness Visit today  Your next wellness visit will be due in one year (8/2/2023)  The screening/preventive services that you may require over the next 5-10 years are detailed below  Some tests may not apply to you based off risk factors and/or age  Screening tests ordered at today's visit but not completed yet may show as past due  Also, please note that scanned in results may not display below  Preventive Screenings:  Service Recommendations Previous Testing/Comments   Colorectal Cancer Screening  · Colonoscopy    · Fecal Occult Blood Test (FOBT)/Fecal Immunochemical Test (FIT)  · Fecal DNA/Cologuard Test  · Flexible Sigmoidoscopy Age: 54-65 years old   Colonoscopy: every 10 years (May be performed more frequently if at higher risk)  OR  FOBT/FIT: every 1 year  OR  Cologuard: every 3 years  OR  Sigmoidoscopy: every 5 years  Screening may be recommended earlier than age 48 if at higher risk for colorectal cancer  Also, an individualized decision between you and your healthcare provider will decide whether screening between the ages of 74-80 would be appropriate   Colonoscopy: 08/03/2016  FOBT/FIT: Not on file  Cologuard: Not on file  Sigmoidoscopy: Not on file    Screening Current     Prostate Cancer Screening Individualized decision between patient and health care provider in men between ages of 53-78   Medicare will cover every 12 months beginning on the day after your 50th birthday PSA: 4 1 ng/mL           Hepatitis C Screening Once for adults born between 1945 and 1965  More frequently in patients at high risk for Hepatitis C Hep C Antibody: Not on file    Screening Current   Diabetes Screening 1-2 times per year if you're at risk for diabetes or have pre-diabetes Fasting glucose: No results in last 5 years   A1C: No results in last 5 years        Cholesterol Screening Once every 5 years if you don't have a lipid disorder  May order more often based on risk factors  Lipid panel: 06/16/2021    Screening Current      Other Preventive Screenings Covered by Medicare:  1  Abdominal Aortic Aneurysm (AAA) Screening: covered once if your at risk  You're considered to be at risk if you have a family history of AAA or a male between the age of 73-68 who smoking at least 100 cigarettes in your lifetime  2  Lung Cancer Screening: covers low dose CT scan once per year if you meet all of the following conditions: (1) Age 50-69; (2) No signs or symptoms of lung cancer; (3) Current smoker or have quit smoking within the last 15 years; (4) You have a tobacco smoking history of at least 30 pack years (packs per day x number of years you smoked); (5) You get a written order from a healthcare provider  3  Glaucoma Screening: covered annually if you're considered high risk: (1) You have diabetes OR (2) Family history of glaucoma OR (3)  aged 48 and older OR (3)  American aged 72 and older  3  Osteoporosis Screening: covered every 2 years if you meet one of the following conditions: (1) Have a vertebral abnormality; (2) On glucocorticoid therapy for more than 3 months; (3) Have primary hyperparathyroidism; (4) On osteoporosis medications and need to assess response to drug therapy  5  HIV Screening: covered annually if you're between the age of 12-76  Also covered annually if you are younger than 13 and older than 72 with risk factors for HIV infection  For pregnant patients, it is covered up to 3 times per pregnancy      Immunizations:  Immunization Recommendations   Influenza Vaccine Annual influenza vaccination during flu season is recommended for all persons aged >= 6 months who do not have contraindications   Pneumococcal Vaccine (Prevnar and Pneumovax)  * Prevnar = PCV13  * Pneumovax = PPSV23 Adults 25-60 years old: 1-3 doses may be recommended based on certain risk factors  Adults 65+ years old: Prevnar (PCV13) vaccine recommended followed by Pneumovax (PPSV23) vaccine  If already received PPSV23 since turning 65, then PCV13 recommended at least one year after PPSV23 dose  Hepatitis B Vaccine 3 dose series if at intermediate or high risk (ex: diabetes, end stage renal disease, liver disease)   Tetanus (Td) Vaccine - COST NOT COVERED BY MEDICARE PART B Following completion of primary series, a booster dose should be given every 10 years to maintain immunity against tetanus  Td may also be given as tetanus wound prophylaxis  Tdap Vaccine - COST NOT COVERED BY MEDICARE PART B Recommended at least once for all adults  For pregnant patients, recommended with each pregnancy  Shingles Vaccine (Shingrix) - COST NOT COVERED BY MEDICARE PART B  2 shot series recommended in those aged 48 and above     Health Maintenance Due:      Topic Date Due    Hepatitis C Screening  05/29/2023 (Originally 1955)    Colorectal Cancer Screening  08/03/2026     Immunizations Due:      Topic Date Due    Hepatitis A Vaccine (1 of 2 - Risk 2-dose series) Never done    Hepatitis B Vaccine (1 of 3 - Risk 3-dose series) Never done    Pneumococcal Vaccine: 65+ Years (1 - PCV) Never done    COVID-19 Vaccine (3 - Booster for Moderna series) 10/06/2021    Influenza Vaccine (1) 09/01/2022     Advance Directives   What are advance directives? Advance directives are legal documents that state your wishes and plans for medical care  These plans are made ahead of time in case you lose your ability to make decisions for yourself  Advance directives can apply to any medical decision, such as the treatments you want, and if you want to donate organs  What are the types of advance directives? There are many types of advance directives, and each state has rules about how to use them  You may choose a combination of any of the following:  · Living will: This is a written record of the treatment you want   You can also choose which treatments you do not want, which to limit, and which to stop at a certain time  This includes surgery, medicine, IV fluid, and tube feedings  · Durable power of  for healthcare [place] [facility]): This is a written record that states who you want to make healthcare choices for you when you are unable to make them for yourself  This person, called a proxy, is usually a family member or a friend  You may choose more than 1 proxy  · Do not resuscitate (DNR) order:  A DNR order is used in case your heart stops beating or you stop breathing  It is a request not to have certain forms of treatment, such as CPR  A DNR order may be included in other types of advance directives  · Medical directive: This covers the care that you want if you are in a coma, near death, or unable to make decisions for yourself  You can list the treatments you want for each condition  Treatment may include pain medicine, surgery, blood transfusions, dialysis, IV or tube feedings, and a ventilator (breathing machine)  · Values history: This document has questions about your views, beliefs, and how you feel and think about life  This information can help others choose the care that you would choose  Why are advance directives important? An advance directive helps you control your care  Although spoken wishes may be used, it is better to have your wishes written down  Spoken wishes can be misunderstood, or not followed  Treatments may be given even if you do not want them  An advance directive may make it easier for your family to make difficult choices about your care  Weight Management   Why it is important to manage your weight:  Being overweight increases your risk of health conditions such as heart disease, high blood pressure, type 2 diabetes, and certain types of cancer  It can also increase your risk for osteoarthritis, sleep apnea, and other respiratory problems  Aim for a slow, steady weight loss   Even a small amount of weight loss can lower your risk of health problems  How to lose weight safely:  A safe and healthy way to lose weight is to eat fewer calories and get regular exercise  You can lose up about 1 pound a week by decreasing the number of calories you eat by 500 calories each day  Healthy meal plan for weight management:  A healthy meal plan includes a variety of foods, contains fewer calories, and helps you stay healthy  A healthy meal plan includes the following:  · Eat whole-grain foods more often  A healthy meal plan should contain fiber  Fiber is the part of grains, fruits, and vegetables that is not broken down by your body  Whole-grain foods are healthy and provide extra fiber in your diet  Some examples of whole-grain foods are whole-wheat breads and pastas, oatmeal, brown rice, and bulgur  · Eat a variety of vegetables every day  Include dark, leafy greens such as spinach, kale, jazmin greens, and mustard greens  Eat yellow and orange vegetables such as carrots, sweet potatoes, and winter squash  · Eat a variety of fruits every day  Choose fresh or canned fruit (canned in its own juice or light syrup) instead of juice  Fruit juice has very little or no fiber  · Eat low-fat dairy foods  Drink fat-free (skim) milk or 1% milk  Eat fat-free yogurt and low-fat cottage cheese  Try low-fat cheeses such as mozzarella and other reduced-fat cheeses  · Choose meat and other protein foods that are low in fat  Choose beans or other legumes such as split peas or lentils  Choose fish, skinless poultry (chicken or turkey), or lean cuts of red meat (beef or pork)  Before you cook meat or poultry, cut off any visible fat  · Use less fat and oil  Try baking foods instead of frying them  Add less fat, such as margarine, sour cream, regular salad dressing and mayonnaise to foods  Eat fewer high-fat foods  Some examples of high-fat foods include french fries, doughnuts, ice cream, and cakes  · Eat fewer sweets  Limit foods and drinks that are high in sugar  This includes candy, cookies, regular soda, and sweetened drinks  Exercise:  Exercise at least 30 minutes per day on most days of the week  Some examples of exercise include walking, biking, dancing, and swimming  You can also fit in more physical activity by taking the stairs instead of the elevator or parking farther away from stores  Ask your healthcare provider about the best exercise plan for you  © Copyright [employer] 2018 Information is for End User's use only and may not be sold, redistributed or otherwise used for commercial purposes   All illustrations and images included in CareNotes® are the copyrighted property of [employer]  or [address] [place] [address]

## 2022-08-04 ENCOUNTER — OFFICE VISIT (OUTPATIENT)
Dept: PHYSICAL THERAPY | Facility: CLINIC | Age: 67
End: 2022-08-04
Payer: COMMERCIAL

## 2022-08-04 DIAGNOSIS — M16.11 PRIMARY OSTEOARTHRITIS OF RIGHT HIP: Primary | ICD-10-CM

## 2022-08-04 PROCEDURE — 97112 NEUROMUSCULAR REEDUCATION: CPT

## 2022-08-04 PROCEDURE — 97110 THERAPEUTIC EXERCISES: CPT

## 2022-08-04 NOTE — PROGRESS NOTES
Daily Note     Today's date: 2022  Patient name: Justin Marcus  : 1955  MRN: [id number]  Referring provider: Bakari Sheth, *  Dx:   Encounter Diagnosis     ICD-10-CM    1  Primary osteoarthritis of right hip  M16 11        Start Time: 1058          Subjective: I saw the Dr & he said everything looks good  Objective: See treatment diary below      Assessment: Tolerated treatment fair  Patient demonstrated fatigue post treatment, exhibited good technique with therapeutic exercises and would benefit from continued PT    Patient ambulating using SPC  Patients wife present dring session  Plan: Progress treatment as tolerated         Precautions: THR right 22      Manuals  EVAL      FOTO         R hip PROM / stretching  perf  perf perf         Prone R hip stretch x 2 minutes perf x 4 minutes w/ quad stretching   perf x 4 minutes w/ quad stretching  perf x 4 minutes w/ quad stretching                                 Neuro Re-Ed                                                                                           NuStep    10 min L1 10m 10 min lvl 3 10 min L3       Ther Ex             Ankle pumps 10x HEP --          Quad sets 10x HEP           Glut sets 10x HEP           Heel slide 10x ------  20x 20x ------       HR (standing)  20x  20x 20x 20x -----       LSU/FSU   4" step x 10 reps each  4" step x 10 reps each  6" 20x 6" 20x leading with R 6" x 20 reps leading w/ R       Lateral steps @rail   6 laps  4 laps  4 laps 4 laps       SLR  2x5 reps AAROM  3x5 reps AAROM   3x5 reps AAROM 4 x 5 reps AROM        Ther Activity             STS   20" mat 2 x 10 reps  20" mat 2 x 10 reps  20" 20x 20" 20x Blue foam on chair x 20 reps        Supine clamshells   RTB x 20 reps  20x RTB    Seated 30x RTB        Gait Training  Using SPC 40 ft w/ v/c  40 ft using SPC    ---                                 Modalities               CP @ home CP @ home

## 2022-08-08 ENCOUNTER — OFFICE VISIT (OUTPATIENT)
Dept: PHYSICAL THERAPY | Facility: CLINIC | Age: 67
End: 2022-08-08
Payer: COMMERCIAL

## 2022-08-08 DIAGNOSIS — M16.11 PRIMARY OSTEOARTHRITIS OF RIGHT HIP: Primary | ICD-10-CM

## 2022-08-08 PROCEDURE — 97112 NEUROMUSCULAR REEDUCATION: CPT

## 2022-08-08 PROCEDURE — 97112 NEUROMUSCULAR REEDUCATION: CPT | Performed by: PHYSICAL MEDICINE & REHABILITATION

## 2022-08-08 PROCEDURE — 97110 THERAPEUTIC EXERCISES: CPT | Performed by: PHYSICAL MEDICINE & REHABILITATION

## 2022-08-08 PROCEDURE — 97110 THERAPEUTIC EXERCISES: CPT

## 2022-08-08 NOTE — PROGRESS NOTES
Daily Note     Today's date: 2022  Patient name: Minal Wiley  : 1955  MRN: [id number]  Referring provider: Yue Olvera, *  Dx:   Encounter Diagnosis     ICD-10-CM    1  Primary osteoarthritis of right hip  M16 11                   Subjective: Patient reports he has been focusing on edema management with elevating RLE and doing ankle pumps with some improvement  Objective: See treatment diary below      Assessment: Tolerated treatment fair  Patient continues to progress well functionally, does require SPC for ambulated at this time  Patient demonstrated fatigue post treatment, exhibited good technique with therapeutic exercises and would benefit from continued PT    Patient ambulating using SPC  Patients wife present dring session  Plan: Progress treatment as tolerated         Precautions: THR right 22      Manuals  EVAL      FOTO         R hip PROM / stretching  perf  perf perf Perf        Prone R hip stretch x 2 minutes perf x 4 minutes w/ quad stretching   perf x 4 minutes w/ quad stretching  perf x 4 minutes w/ quad stretching Perf in prone                                Neuro Re-Ed                                                                                           NuStep    10 min L1 10m 10 min lvl 3 10 min L3 10 min L3      Ther Ex             Ankle pumps 10x HEP --          Quad sets 10x HEP           Glut sets 10x HEP           Heel slide 10x ------  20x 20x ------       HR (standing)  20x  20x 20x 20x -----       LSU/FSU   4" step x 10 reps each  4" step x 10 reps each  6" 20x 6" 20x leading with R 6" x 20 reps leading w/ R 6" x 20 reps leading w/ R      Lateral steps @rail   6 laps  4 laps  4 laps 4 laps 5 laps no UE support      SLR  2x5 reps AAROM  3x5 reps AAROM   3x5 reps AAROM 4 x 5 reps AROM  4 x 5 reps AROM with PT assist      Ther Activity             STS   20" mat 2 x 10 reps  20" mat 2 x 10 reps  20" 20x 20" 20x Blue foam on chair x 20 reps  Blue foam on chair x 20 reps       Supine clamshells   RTB x 20 reps  20x RTB    Seated 30x RTB  Seated 30x RTB       Bridging       2x10      Gait Training  Using SPC 40 ft w/ v/c  40 ft using SPC    ---                                 Modalities               CP @ home CP @ home

## 2022-08-11 ENCOUNTER — OFFICE VISIT (OUTPATIENT)
Dept: PHYSICAL THERAPY | Facility: CLINIC | Age: 67
End: 2022-08-11
Payer: COMMERCIAL

## 2022-08-11 DIAGNOSIS — M16.11 PRIMARY OSTEOARTHRITIS OF RIGHT HIP: Primary | ICD-10-CM

## 2022-08-11 PROCEDURE — 97110 THERAPEUTIC EXERCISES: CPT

## 2022-08-11 PROCEDURE — 97112 NEUROMUSCULAR REEDUCATION: CPT

## 2022-08-11 NOTE — PROGRESS NOTES
Daily Note     Today's date: 2022  Patient name: Sage Jacome  : 1955  MRN: [id number]  Referring provider: Eric Shah, *  Dx:   Encounter Diagnosis     ICD-10-CM    1  Primary osteoarthritis of right hip  M16 11        Start Time: 1056          Subjective: Patient reports trying to elevate RLE more and doing ankle pumps to help with swelling  Objective: See treatment diary below      Assessment: Tolerated treatment fair  Patient demonstrated fatigue post treatment, exhibited good technique with therapeutic exercises and would benefit from continued PT     Patient ambulating using SPC  Patients wife present dring session  Plan: Progress treatment as tolerated         Precautions: THR right 22      Manuals  EVAL      FOTO        R hip PROM / stretching  perf  perf perf Perf perf       Prone R hip stretch x 2 minutes perf x 4 minutes w/ quad stretching   perf x 4 minutes w/ quad stretching  perf x 4 minutes w/ quad stretching Perf in prone prone                               Neuro Re-Ed                                                                                           NuStep    10 min L1 10m 10 min lvl 3 10 min L3 10 min L3 10 min L3     Ther Ex 7/            Ankle pumps 10x HEP --          Quad sets 10x HEP           Glut sets 10x HEP           Heel slide 10x ------  20x 20x ------       HR (standing)  20x  20x 20x 20x -----       LSU/FSU   4" step x 10 reps each  4" step x 10 reps each  6" 20x 6" 20x leading with R 6" x 20 reps leading w/ R 6" x 20 reps leading w/ R 6" x 20 reps      Lateral steps @rail   6 laps  4 laps  4 laps 4 laps 5 laps no UE support ---     SLR  2x5 reps AAROM  3x5 reps AAROM   3x5 reps AAROM 4 x 5 reps AROM  4 x 5 reps AROM with PT assist 3x5 reps AAROM ; 5x AROM      Ther Activity             STS   20" mat 2 x 10 reps  20" mat 2 x 10 reps  20" 20x 20" 20x Blue foam on chair x 20 reps  Blue foam on chair x 20 reps  Blue foam on chair x 20 reps     Supine clamshells   RTB x 20 reps  20x RTB    Seated 30x RTB  Seated 30x RTB  30x seated RTB     Bridging       2x10 2x10 reps      Gait Training  Using SPC 40 ft w/ v/c  40 ft using SPC    ---                                 Modalities               CP @ home CP @ home

## 2022-08-15 ENCOUNTER — OFFICE VISIT (OUTPATIENT)
Dept: PHYSICAL THERAPY | Facility: CLINIC | Age: 67
End: 2022-08-15
Payer: COMMERCIAL

## 2022-08-15 DIAGNOSIS — M16.11 PRIMARY OSTEOARTHRITIS OF RIGHT HIP: Primary | ICD-10-CM

## 2022-08-15 PROCEDURE — 97110 THERAPEUTIC EXERCISES: CPT

## 2022-08-15 PROCEDURE — 97112 NEUROMUSCULAR REEDUCATION: CPT

## 2022-08-15 NOTE — PROGRESS NOTES
Daily Note     Today's date: 8/15/2022  Patient name: Julia Rees  : 1955  MRN: [id number]  Referring provider: Hitesh Bradley, *  Dx:   Encounter Diagnosis     ICD-10-CM    1  Primary osteoarthritis of right hip  M16 11        Start Time: 1100          Subjective: My hip is feeling better & the swelling is finally going down  Objective: See treatment diary below      Assessment: Tolerated treatment fair  Patient demonstrated fatigue post treatment, exhibited good technique with therapeutic exercises and would benefit from continued PT  Patient ambulating using SPC  Patients wife present dring session  Plan: Progress treatment as tolerated         Precautions: THR right 22      Manuals  EVAL      FOTO 08/08 8/11 8/15      R hip PROM / stretching  perf  perf perf Perf perf perf      Prone R hip stretch x 2 minutes perf x 4 minutes w/ quad stretching   perf x 4 minutes w/ quad stretching  perf x 4 minutes w/ quad stretching Perf in prone prone prone                              Neuro Re-Ed                                                                                       Standing hip ext x 20 reps     NuStep    10 min L1 10m 10 min lvl 3 10 min L3 10 min L3 10 min L3 10 min L3    Ther Ex             Ankle pumps 10x HEP --      HEP    Quad sets 10x HEP       ----    Glut sets 10x HEP       --    Heel slide 10x ------  20x 20x ------   ---    HR (standing)  20x  20x 20x 20x -----   20x    LSU/FSU   4" step x 10 reps each  4" step x 10 reps each  6" 20x 6" 20x leading with R 6" x 20 reps leading w/ R 6" x 20 reps leading w/ R 6" x 20 reps  6" step x 10 reps     Lateral steps @rail   6 laps  4 laps  4 laps 4 laps 5 laps no UE support --- ---    SLR  2x5 reps AAROM  3x5 reps AAROM   3x5 reps AAROM 4 x 5 reps AROM  4 x 5 reps AROM with PT assist 3x5 reps AAROM ; 5x AROM  AROM 3 x 5 reps     Ther Activity             STS   20" mat 2 x 10 reps  20" mat 2 x 10 reps  20" 20x 20" 20x Blue foam on chair x 20 reps  Blue foam on chair x 20 reps  Blue foam on chair x 20 reps Blue foam on chair x 20 reps    Supine clamshells   RTB x 20 reps  20x RTB    Seated 30x RTB  Seated 30x RTB  30x seated RTB 30x GTB (seated)    Bridging       2x10 2x10 reps  2x10 reps     Gait Training  Using SPC 40 ft w/ v/c  40 ft using SPC    ---   ----                              Modalities               CP @ home CP @ home      10 min supine

## 2022-08-18 ENCOUNTER — OFFICE VISIT (OUTPATIENT)
Dept: PHYSICAL THERAPY | Facility: CLINIC | Age: 67
End: 2022-08-18
Payer: COMMERCIAL

## 2022-08-18 DIAGNOSIS — M16.11 PRIMARY OSTEOARTHRITIS OF RIGHT HIP: Primary | ICD-10-CM

## 2022-08-18 PROCEDURE — 97112 NEUROMUSCULAR REEDUCATION: CPT

## 2022-08-18 PROCEDURE — 97112 NEUROMUSCULAR REEDUCATION: CPT | Performed by: PHYSICAL MEDICINE & REHABILITATION

## 2022-08-18 PROCEDURE — 97110 THERAPEUTIC EXERCISES: CPT | Performed by: PHYSICAL MEDICINE & REHABILITATION

## 2022-08-18 PROCEDURE — 97110 THERAPEUTIC EXERCISES: CPT

## 2022-08-18 NOTE — PROGRESS NOTES
Daily Note     Today's date: 2022  Patient name: Tomás Ortiz  : 1955  MRN: [id number]  Referring provider: Noel Laboy, *  Dx:   Encounter Diagnosis     ICD-10-CM    1  Primary osteoarthritis of right hip  M16 11                   Subjective: Patient reports continued progressions functionally  Patient states he has been ambulating without his cane around the home and feels safe doing so  Patient notes he does continue to use [facility] for community ambulation      Objective: See treatment diary below      Assessment: Tolerated treatment fair  Patient did well with exercise progression today  Patient demonstrated fatigue post treatment, exhibited good technique with therapeutic exercises and would benefit from continued PT  Patients wife present dring session  Plan: Progress treatment as tolerated         Precautions: THR right 22      Manuals  EVAL      FOTO 08/08 8/11 8/15 2022     R hip PROM / stretching  perf  perf perf Perf perf perf perf     Prone R hip stretch x 2 minutes perf x 4 minutes w/ quad stretching   perf x 4 minutes w/ quad stretching  perf x 4 minutes w/ quad stretching Perf in prone prone prone prone                             Neuro Re-Ed                                                                                       Standing hip ext x 20 reps     NuStep    10 min L1 10m 10 min lvl 3 10 min L3 10 min L3 10 min L3 10 min L3 10 min L3   Ther Ex             Ankle pumps 10x HEP --      HEP    Quad sets 10x HEP       ----    Glut sets 10x HEP       --    Heel slide 10x ------  20x 20x ------   ---    HR (standing)  20x  20x 20x 20x -----   20x 20x   LSU/FSU   4" step x 10 reps each  4" step x 10 reps each  6" 20x 6" 20x leading with R 6" x 20 reps leading w/ R 6" x 20 reps leading w/ R 6" x 20 reps  6" step x 10 reps  6" step x 10 reps    Lateral steps @rail   6 laps  4 laps  4 laps 4 laps 5 laps no UE support --- ---    Hip 3 way          YTB 20x ea   Lateral walking with resistance          5 laps   SLR  2x5 reps AAROM  3x5 reps AAROM   3x5 reps AAROM 4 x 5 reps AROM  4 x 5 reps AROM with PT assist 3x5 reps AAROM ; 5x AROM  AROM 3 x 5 reps  AROM 3 x 5 reps    Ther Activity             STS   20" mat 2 x 10 reps  20" mat 2 x 10 reps  20" 20x 20" 20x Blue foam on chair x 20 reps  Blue foam on chair x 20 reps  Blue foam on chair x 20 reps Blue foam on chair x 20 reps Blue foam on chair x 20 reps   Supine clamshells   RTB x 20 reps  20x RTB    Seated 30x RTB  Seated 30x RTB  30x seated RTB 30x GTB (seated) Supine 30x   Bridging       2x10 2x10 reps  2x10 reps  2x10 reps    Gait Training  Using SPC 40 ft w/ v/c  40 ft using SPC    ---   ----                              Modalities               CP @ home CP @ home      10 min supine

## 2022-08-22 ENCOUNTER — OFFICE VISIT (OUTPATIENT)
Dept: PHYSICAL THERAPY | Facility: CLINIC | Age: 67
End: 2022-08-22
Payer: COMMERCIAL

## 2022-08-22 DIAGNOSIS — M16.11 PRIMARY OSTEOARTHRITIS OF RIGHT HIP: Primary | ICD-10-CM

## 2022-08-22 PROCEDURE — 97112 NEUROMUSCULAR REEDUCATION: CPT

## 2022-08-22 PROCEDURE — 97110 THERAPEUTIC EXERCISES: CPT

## 2022-08-22 NOTE — PROGRESS NOTES
Daily Note     Today's date: 2022  Patient name: Justin Marcus  : 1955  MRN: [id number]  Referring provider: Bakari Sheth, *  Dx:   Encounter Diagnosis     ICD-10-CM    1  Primary osteoarthritis of right hip  M16 11        Start Time: 1054          Subjective: I've been trying to walk more  I spoke to the Dr's office & they said the leg swelling is normal & part of recovery process  Objective: See treatment diary below      Assessment: Tolerated treatment fair  Patient demonstrated fatigue post treatment, exhibited good technique with therapeutic exercises and would benefit from continued PT  Patient arrives ambulating using SPC  Patients wife present during session  Plan: Progress treatment as tolerated         Precautions: THR right 22      Manuals   FOTO 08/08 8/11 8/15 2022    R hip PROM / stretching    perf  perf perf Perf perf perf perf    Prone R hip stretch x 2 minutes  perf x 4 minutes w/ quad stretching   perf x 4 minutes w/ quad stretching  perf x 4 minutes w/ quad stretching Perf in prone prone prone prone                             Neuro Re-Ed                                                                                       Standing hip ext x 20 reps     NuStep  10 min L3   10 min L1 10m 10 min lvl 3 10 min L3 10 min L3 10 min L3 10 min L3 10 min L3   Ther Ex             Ankle pumps HEP  --      HEP    Quad sets HEP        ----    Glut sets HEP        --    Heel slide --- ------  20x 20x ------   ---    HR (standing) on  roll  20x  20x 20x 20x -----   20x 20x   LSU/FSU  6" step x 10 reps   4" step x 10 reps each  6" 20x 6" 20x leading with R 6" x 20 reps leading w/ R 6" x 20 reps leading w/ R 6" x 20 reps  6" step x 10 reps  6" step x 10 reps    Lateral steps @rail  ----   4 laps  4 laps 4 laps 5 laps no UE support --- ---    Hip 3 way ----         YTB 20x ea   Lateral walking with resistance RTB x 4 laps         5 laps   SLR AROM 3 x 5 reps   3x5 reps AAROM   3x5 reps AAROM 4 x 5 reps AROM  4 x 5 reps AROM with PT assist 3x5 reps AAROM ; 5x AROM  AROM 3 x 5 reps  AROM 3 x 5 reps    Ther Activity             STS  20" mat x 20 reps    20" mat 2 x 10 reps  20" 20x 20" 20x Blue foam on chair x 20 reps  Blue foam on chair x 20 reps  Blue foam on chair x 20 reps Blue foam on chair x 20 reps Blue foam on chair x 20 reps   Supine clamshells  30x GTB  20x RTB    Seated 30x RTB  Seated 30x RTB  30x seated RTB 30x GTB (seated) Supine 30x   Bridging 2x10 reps       2x10 2x10 reps  2x10 reps  2x10 reps    Gait Training       ---   ----                              Modalities              ----  CP @ home      10 min supine

## 2022-08-25 ENCOUNTER — OFFICE VISIT (OUTPATIENT)
Dept: PHYSICAL THERAPY | Facility: CLINIC | Age: 67
End: 2022-08-25
Payer: COMMERCIAL

## 2022-08-25 DIAGNOSIS — M16.11 PRIMARY OSTEOARTHRITIS OF RIGHT HIP: Primary | ICD-10-CM

## 2022-08-25 PROCEDURE — 97110 THERAPEUTIC EXERCISES: CPT

## 2022-08-25 PROCEDURE — 97112 NEUROMUSCULAR REEDUCATION: CPT

## 2022-08-25 NOTE — PROGRESS NOTES
Daily Note     Today's date: 2022  Patient name: Bull Jacques  : 1955  MRN: [id number]  Referring provider: Francesco Oakes, *  Dx:   Encounter Diagnosis     ICD-10-CM    1  Primary osteoarthritis of right hip  M16 11        Start Time: 1143          Subjective: I walked around [employer] yesterday holding onto cart without any problems  Objective: See treatment diary below      Assessment: Tolerated treatment fair  Patient demonstrated fatigue post treatment, exhibited good technique with therapeutic exercises and would benefit from continued PT     Patient arrives ambulating using SPC  Patients wife present during session  Plan: Progress treatment as tolerated         Precautions: THR right 22      Manuals   FOTO 08/08 8/11 8/15 2022    R hip PROM / stretching    perf  perf perf Perf perf perf perf    Prone R hip stretch   perf x 4 minutes w/ quad stretching   perf x 4 minutes w/ quad stretching  perf x 4 minutes w/ quad stretching Perf in prone prone prone prone                             Neuro Re-Ed                                                                                       Standing hip ext x 20 reps     NuStep  10 min L3   10 min L1 10m 10 min lvl 3 10 min L3 10 min L3 10 min L3 10 min L3 10 min L3   Ther Ex             Ankle pumps HEP  --      HEP    Quad sets HEP        ----    Glut sets HEP        --    Heel slide --- ------  20x 20x ------   ---    HR (standing) on  roll  20x  20x 20x 20x -----   20x 20x   LSU/FSU  6" step x 10 reps   4" step x 10 reps each  6" 20x 6" 20x leading with R 6" x 20 reps leading w/ R 6" x 20 reps leading w/ R 6" x 20 reps  6" step x 10 reps  6" step x 10 reps    Lateral steps @rail  ----   4 laps  4 laps 4 laps 5 laps no UE support --- ---    Hip 3 way ----         YTB 20x ea   Lateral walking with resistance RTB x 4 laps         5 laps   SLR AROM 2 x 10 reps   3x5 reps AAROM   3x5 reps AAROM 4 x 5 reps AROM  4 x 5 reps AROM with PT assist 3x5 reps AAROM ; 5x AROM  AROM 3 x 5 reps  AROM 3 x 5 reps    Ther Activity             STS  20" mat x 20 reps w/ RTB abd   20" mat 2 x 10 reps  20" 20x 20" 20x Blue foam on chair x 20 reps  Blue foam on chair x 20 reps  Blue foam on chair x 20 reps Blue foam on chair x 20 reps Blue foam on chair x 20 reps   S/L  clamshells  2 x 10 reps   20x RTB    Seated 30x RTB  Seated 30x RTB  30x seated RTB 30x GTB (seated) Supine 30x   Bridging 3x10 reps       2x10 2x10 reps  2x10 reps  2x10 reps    Gait Training       ---   ----                              Modalities                CP @ home      10 min supine

## 2022-08-29 ENCOUNTER — OFFICE VISIT (OUTPATIENT)
Dept: PHYSICAL THERAPY | Facility: CLINIC | Age: 67
End: 2022-08-29
Payer: COMMERCIAL

## 2022-08-29 DIAGNOSIS — M16.11 PRIMARY OSTEOARTHRITIS OF RIGHT HIP: Primary | ICD-10-CM

## 2022-08-29 PROCEDURE — 97110 THERAPEUTIC EXERCISES: CPT

## 2022-08-29 PROCEDURE — 97112 NEUROMUSCULAR REEDUCATION: CPT

## 2022-08-29 NOTE — PROGRESS NOTES
Daily Note     Today's date: 2022  Patient name: Claudetta Fear  : 1955  MRN: [id number]  Referring provider: Erin Balbuena, *  Dx:   Encounter Diagnosis     ICD-10-CM    1  Primary osteoarthritis of right hip  M16 11                   Subjective: My biggest problem remains the leg swelling  It's good in the morning & then increases throughout the day  Objective: See treatment diary below    Assessment: Tolerated treatment fair  Patient demonstrated fatigue post treatment, exhibited good technique with therapeutic exercises and would benefit from continued PT  Patient arrives ambulating using SPC  Patients wife present during session  Plan: Progress treatment as tolerated         Precautions: THR right 22      Manuals   FOTO 08/08 8/11 8/15 2022    R hip PROM / stretching    perf  perf perf Perf perf perf perf    Prone R hip stretch   perf x 4 minutes w/ quad stretching   perf x 4 minutes w/ quad stretching  perf x 4 minutes w/ quad stretching Perf in prone prone prone prone                             Neuro Re-Ed             [place] balance walk outs  @#7 - 2 x 10 reps (lateral)                                                                         Standing hip ext x 20 reps     NuStep  10 min L3   10 min L1 10m 10 min lvl 3 10 min L3 10 min L3 10 min L3 10 min L3 10 min L3   Ther Ex             Ankle pumps HEP  --      HEP    Quad sets HEP        ----    Glut sets HEP        --    Heel slide --- ------  20x 20x ------   ---    HR (standing) on  roll  20x  20x 20x 20x -----   20x 20x   LSU/FSU  6" step x 10 reps   4" step x 10 reps each  6" 20x 6" 20x leading with R 6" x 20 reps leading w/ R 6" x 20 reps leading w/ R 6" x 20 reps  6" step x 10 reps  6" step x 10 reps    Lateral steps @rail  ----   4 laps  4 laps 4 laps 5 laps no UE support --- ---    Hip 3 way ----         YTB 20x ea   Lateral walking with resistance RTB x 4 laps         5 laps   SLR AROM 2 x 10 reps   3x5 reps AAROM   3x5 reps AAROM 4 x 5 reps AROM  4 x 5 reps AROM with PT assist 3x5 reps AAROM ; 5x AROM  AROM 3 x 5 reps  AROM 3 x 5 reps    Ther Activity             STS  20" mat x 20 reps w/ RTB abd   20" mat 2 x 10 reps  20" 20x 20" 20x Blue foam on chair x 20 reps  Blue foam on chair x 20 reps  Blue foam on chair x 20 reps Blue foam on chair x 20 reps Blue foam on chair x 20 reps   S/L  clamshells  2 x 10 reps   20x RTB    Seated 30x RTB  Seated 30x RTB  30x seated RTB 30x GTB (seated) Supine 30x   Bridging 3x10 reps       2x10 2x10 reps  2x10 reps  2x10 reps    Gait Training       ---   ----                              Modalities                CP @ home      10 min supine

## 2022-09-01 ENCOUNTER — OFFICE VISIT (OUTPATIENT)
Dept: FAMILY MEDICINE CLINIC | Facility: CLINIC | Age: 67
End: 2022-09-01
Payer: COMMERCIAL

## 2022-09-01 ENCOUNTER — OFFICE VISIT (OUTPATIENT)
Dept: PHYSICAL THERAPY | Facility: CLINIC | Age: 67
End: 2022-09-01
Payer: COMMERCIAL

## 2022-09-01 ENCOUNTER — TELEPHONE (OUTPATIENT)
Dept: FAMILY MEDICINE CLINIC | Facility: CLINIC | Age: 67
End: 2022-09-01

## 2022-09-01 ENCOUNTER — HOSPITAL ENCOUNTER (OUTPATIENT)
Dept: RADIOLOGY | Facility: HOSPITAL | Age: 67
Discharge: HOME | End: 2022-09-01
Attending: FAMILY MEDICINE
Payer: COMMERCIAL

## 2022-09-01 VITALS — SYSTOLIC BLOOD PRESSURE: 140 MMHG | HEART RATE: 99 BPM | OXYGEN SATURATION: 96 % | DIASTOLIC BLOOD PRESSURE: 82 MMHG

## 2022-09-01 DIAGNOSIS — Z96.641 HISTORY OF RIGHT HIP REPLACEMENT: ICD-10-CM

## 2022-09-01 DIAGNOSIS — M79.89 RIGHT LEG SWELLING: ICD-10-CM

## 2022-09-01 DIAGNOSIS — Z86.711 HISTORY OF PULMONARY EMBOLUS (PE): ICD-10-CM

## 2022-09-01 DIAGNOSIS — M79.89 LEFT LEG SWELLING: Primary | ICD-10-CM

## 2022-09-01 DIAGNOSIS — M16.11 PRIMARY OSTEOARTHRITIS OF RIGHT HIP: Primary | ICD-10-CM

## 2022-09-01 DIAGNOSIS — M79.89 LEFT LEG SWELLING: ICD-10-CM

## 2022-09-01 PROCEDURE — 97112 NEUROMUSCULAR REEDUCATION: CPT

## 2022-09-01 PROCEDURE — 97110 THERAPEUTIC EXERCISES: CPT

## 2022-09-01 PROCEDURE — 93970 EXTREMITY STUDY: CPT

## 2022-09-01 PROCEDURE — 97112 NEUROMUSCULAR REEDUCATION: CPT | Performed by: PHYSICAL MEDICINE & REHABILITATION

## 2022-09-01 PROCEDURE — 99214 OFFICE O/P EST MOD 30 MIN: CPT | Performed by: FAMILY MEDICINE

## 2022-09-01 PROCEDURE — 97110 THERAPEUTIC EXERCISES: CPT | Performed by: PHYSICAL MEDICINE & REHABILITATION

## 2022-09-01 PROCEDURE — 93970 EXTREMITY STUDY: CPT | Performed by: SURGERY

## 2022-09-01 RX ORDER — FUROSEMIDE 20 MG/1
20 TABLET ORAL DAILY
Qty: 5 TABLET | Refills: 0 | Status: SHIPPED | OUTPATIENT
Start: 2022-09-01 | End: 2022-09-08 | Stop reason: SDUPTHER

## 2022-09-01 NOTE — TELEPHONE ENCOUNTER
Per Dr Angelica Goodrich, called patient's wife and added him to schedule   He will come up when done with PT

## 2022-09-01 NOTE — TELEPHONE ENCOUNTER
Patient of Dr Ke Martinez had hip surgery 6 weeks ago- leg still swollen   H e saw nurse practitioner at surgeon's office yesterday and she recommended follow up with PCP  Patient has appointment with Dr Kojo Ren on 9/6 but he is downstairs at PT right now and would like someone to look at leg swelling  I advised that Dr Ke Martinez is out of office at this time  Please advise

## 2022-09-01 NOTE — PROGRESS NOTES
Spoke to the tech at vascular  DVT negative  Started on Lasix 20 mg x 5 days  Encouraged to have bananas every other day  Recommend wearing compression stocking  Okay to start PT  Change PCP appointment next Thursday for weight check  No

## 2022-09-01 NOTE — PROGRESS NOTES
Daily Note     Today's date: 2022  Patient name: Ed Mann  : 1955  MRN: [id number]  Referring provider: Louie Canseco, *  Dx:   Encounter Diagnosis     ICD-10-CM    1  Primary osteoarthritis of right hip  M16 11                   Subjective: Patient reports he had a follow up with his orthopedist who referred him to his PCP due to LE edema  Patient states that he has been trying to control the edema at home with elevation and is considering returning to compression socks  Objective: See treatment diary below    Assessment: Tolerated treatment well  Patient with good strength and stability in right hip, able to tolerate exercise progression well  Edema present in BLE, patient will have follow up with PCP after physical therapy today  Patient demonstrated fatigue post treatment, exhibited good technique with therapeutic exercises and would benefit from continued PT  Patient arrives ambulating using SPC  Patients wife present during session  Plan: Progress treatment as tolerated         Precautions: THR right 22      Manuals 22  FOTO 08/08 8/11 8/15 2022    R hip PROM / stretching   Perf perf  perf perf Perf perf perf perf    Prone R hip stretch  Perf perf x 4 minutes w/ quad stretching   perf x 4 minutes w/ quad stretching  perf x 4 minutes w/ quad stretching Perf in prone prone prone prone                             Neuro Re-Ed             [place] balance walk outs  @#7 - 2 x 10 reps (lateral) @#7 - 2 x 10 reps (lateral)                                                                        Standing hip ext x 20 reps     NuStep  10 min L3  Bike L3 10 min L1 10m 10 min lvl 3 10 min L3 10 min L3 10 min L3 10 min L3 10 min L3   Ther Ex             Ankle pumps HEP  --      HEP    Quad sets HEP        ----    Glut sets HEP        --    Heel slide --- ------  20x 20x ------   ---    HR (standing) on  roll  20x 20x 20x 20x 20x -----   20x 20x LSU/FSU  6" step x 10 reps  6" step x 10 reps  4" step x 10 reps each  6" 20x 6" 20x leading with R 6" x 20 reps leading w/ R 6" x 20 reps leading w/ R 6" x 20 reps  6" step x 10 reps  6" step x 10 reps    Lateral steps @rail  ----  YTB 5 laps 4 laps  4 laps 4 laps 5 laps no UE support --- ---    Hip 3 way ---- YTB 2x10        YTB 20x ea   Lateral walking with resistance RTB x 4 laps         5 laps   SLR AROM 2 x 10 reps  AROM 2 x 10 reps  R/L 3x5 reps AAROM   3x5 reps AAROM 4 x 5 reps AROM  4 x 5 reps AROM with PT assist 3x5 reps AAROM ; 5x AROM  AROM 3 x 5 reps  AROM 3 x 5 reps    Ther Activity             STS  20" mat x 20 reps w/ RTB abd  20" mat x 20 reps w/ RTB abd 20" mat 2 x 10 reps  20" 20x 20" 20x Blue foam on chair x 20 reps  Blue foam on chair x 20 reps  Blue foam on chair x 20 reps Blue foam on chair x 20 reps Blue foam on chair x 20 reps   S/L  clamshells  2 x 10 reps  2 x 10 reps 20x RTB    Seated 30x RTB  Seated 30x RTB  30x seated RTB 30x GTB (seated) Supine 30x   Bridging 3x10 reps  3x10 reps      2x10 2x10 reps  2x10 reps  2x10 reps    Gait Training       ---   ----                              Modalities                CP @ home      10 min supine

## 2022-09-01 NOTE — PROGRESS NOTES
Claudetta Fear 1955 male MRN: [id number]    [facility] OFFICE VISIT  [facility]      ASSESSMENT/PLAN  Claudetta Fear is a 78 y o  male presents to the office for    Diagnoses and all orders for this visit:    Left leg swelling  -     Cancel: VAS lower limb venous duplex study, unilateral/limited; Future  -     VAS lower limb venous duplex study, complete bilateral; Future  -     Comprehensive metabolic panel; Future  -     CBC and differential; Future    Right leg swelling  -     VAS lower limb venous duplex study, complete bilateral; Future  -     Comprehensive metabolic panel; Future  -     CBC and differential; Future    History of right hip replacement    History of pulmonary embolus (PE)     Concerning for DVT  Sent for stat ultrasound  Discussed with the patient our options of treatment  Recommend if this is a DVT to started immediately on Eliquis  If this is not a DVT will start him on a trial of Lasix; with compression stocking  Hold on physical therapy till we have all her answers         Future Appointments   Date Time Provider Calin Love   9/1/2022  3:00  [address]   9/6/2022 11:45 AM [place], PTA [address] [place] [facility]   9/6/2022  1:30 PM Corrine Garibay [facility]   9/8/2022 10:15 AM [place], PTA [address] [facility]   9/12/2022 11:00 AM [place], [place] [facility]   9/15/2022 10:15 AM [place], [place] [facility]   9/20/2022 10:15 AM Seneca [place], PTA [address] [facility]   9/22/2022 11:45 AM Cesar [place], [place] [facility]          SUBJECTIVE  CC: No chief complaint on file  HPI:  Claudetta Fear is a 78 y o  male who presents for an acute walk-in appointment  Patient unfortunately started with bilateral leg swelling 2-3 weeks ago  Just recently had a right hip replacement  Patient states that his legs have been more swollen in the last couple days  Physical therapy noted it and brought him upstairs for an evaluation  Does not have any history of cardiac disease  Recent CMP was within normal limits  Has not been using compression stockings  Recently stopped taking baby aspirin on Monday  Has a history of blood clots in a previous surgery 18 years ago required Coumadin at that time    Review of Systems   Constitutional: Negative for activity change, appetite change, chills, fatigue and fever  HENT: Negative for congestion  Respiratory: Negative for cough, chest tightness and shortness of breath  Cardiovascular: Positive for leg swelling  Negative for chest pain  Gastrointestinal: Negative for abdominal distention, abdominal pain, constipation, diarrhea, nausea and vomiting  All other systems reviewed and are negative  Historical Information   The patient history was reviewed as follows:  Past Medical History:   Diagnosis Date    Arthritis          Medications:     Current Outpatient Medications:     aspirin (ECOTRIN LOW STRENGTH) 81 mg EC tablet, Take 81 mg by mouth 2 (two) times a day, Disp: , Rfl:     polyethylene glycol (GLYCOLAX) 17 GM/SCOOP powder, Take 17 g by mouth daily, Disp: 8 5 g, Rfl: 1    Allergies   Allergen Reactions    Aleve [Naproxen] Rash    Penicillins Rash     Reaction Date: [id number];        OBJECTIVE  Vitals:   Vitals:    09/01/22 1245   BP: 140/82   Pulse: 99   SpO2: 96%         Physical Exam  Vitals reviewed  Constitutional:       Appearance: He is well-developed  HENT:      Head: Normocephalic and atraumatic  Eyes:      Conjunctiva/sclera: Conjunctivae normal       Pupils: Pupils are equal, round, and reactive to light  Cardiovascular:      Rate and Rhythm: Normal rate and regular rhythm  Heart sounds: Normal heart sounds  Pulmonary:      Effort: Pulmonary effort is normal  No respiratory distress  Breath sounds: Normal breath sounds     Musculoskeletal:         General: Normal range of motion  Cervical back: Normal range of motion and neck supple  Right lower leg: Edema (1+) present  Left lower leg: Edema (3+) present  Skin:     General: Skin is warm  Capillary Refill: Capillary refill takes less than 2 seconds  Neurological:      Mental Status: He is alert and oriented to person, place, and time                      Jose Juan Pearson MD,   [facility]  9/1/2022

## 2022-09-06 ENCOUNTER — OFFICE VISIT (OUTPATIENT)
Dept: PHYSICAL THERAPY | Facility: CLINIC | Age: 67
End: 2022-09-06
Payer: COMMERCIAL

## 2022-09-06 DIAGNOSIS — M16.11 PRIMARY OSTEOARTHRITIS OF RIGHT HIP: Primary | ICD-10-CM

## 2022-09-06 PROCEDURE — 97112 NEUROMUSCULAR REEDUCATION: CPT

## 2022-09-06 PROCEDURE — 97110 THERAPEUTIC EXERCISES: CPT

## 2022-09-06 NOTE — PROGRESS NOTES
Daily Note     Today's date: 2022  Patient name: Sage Jacome  : 1955  MRN: [id number]  Referring provider: Eric Shah, *  Dx:   Encounter Diagnosis     ICD-10-CM    1  Primary osteoarthritis of right hip  M16 11                   Subjective: I went to see my PCP & then went for an US, which was negative for DVT  Objective: See treatment diary below      Assessment: Tolerated treatment fair  Patient demonstrated fatigue post treatment, exhibited good technique with therapeutic exercises and would benefit from continued PT  Patients wife present during session  Patient ambulating using SPC  Plan: Progress treatment as tolerated         Precautions: THR right 22      Manuals 22  FOTO 08/08 8/11 8/15 2022    R hip PROM / stretching   Perf perf  perf perf Perf perf perf perf    Prone R hip stretch  Perf perf x 4 minutes w/ quad stretching   perf x 4 minutes w/ quad stretching  perf x 4 minutes w/ quad stretching Perf in prone prone prone prone                             Neuro Re-Ed             [place] balance walk outs  @#7 - 2 x 10 reps (lateral) @#7 - 2 x 10 reps (lateral) @#7 - 2 x 10 reps (lateral)                                                              Standing hip ext / abd  ---- --- R 2# x 20 reps       Standing hip ext x 20 reps     NuStep  10 min L3  Bike L3 Bike x 10 min L3  10 min lvl 3 10 min L3 10 min L3 10 min L3 10 min L3 10 min L3   Ther Ex             Ankle pumps HEP  --      HEP    Quad sets HEP  ---      ----    Glut sets HEP  ---      --    Heel slide --- ------ -----  20x ------   ---    HR (standing) on  roll  20x 20x 20x  20x -----   20x 20x   LSU/FSU  6" step x 10 reps  6" step x 10 reps  6" step x 10 reps each   6" 20x leading with R 6" x 20 reps leading w/ R 6" x 20 reps leading w/ R 6" x 20 reps  6" step x 10 reps  6" step x 10 reps    Lateral steps @rail  ----  YTB 5 laps -----  4 laps 4 laps 5 laps no UE support --- ---    Hip 3 way ---- YTB 2x10 ----       YTB 20x ea   Lateral walking with resistance RTB x 4 laps  -----       5 laps   SLR AROM 2 x 10 reps  AROM 2 x 10 reps  R/L 2x10 reps   3x5 reps AAROM 4 x 5 reps AROM  4 x 5 reps AROM with PT assist 3x5 reps AAROM ; 5x AROM  AROM 3 x 5 reps  AROM 3 x 5 reps    Ther Activity             STS  20" mat x 20 reps w/ RTB abd  20" mat x 20 reps w/ RTB abd 1 blue foam on regular chair - 2 x 10 reps  w/RTB  abd  20" 20x Blue foam on chair x 20 reps  Blue foam on chair x 20 reps  Blue foam on chair x 20 reps Blue foam on chair x 20 reps Blue foam on chair x 20 reps   S/L  clamshells  2 x 10 reps  2 x 10 reps ------   Seated 30x RTB  Seated 30x RTB  30x seated RTB 30x GTB (seated) Supine 30x   Bridging 3x10 reps  3x10 reps  2x10 reps     2x10 2x10 reps  2x10 reps  2x10 reps    Gait Training       ---   ----                              Modalities                      10 min supine

## 2022-09-08 ENCOUNTER — OFFICE VISIT (OUTPATIENT)
Dept: FAMILY MEDICINE CLINIC | Facility: CLINIC | Age: 67
End: 2022-09-08
Payer: COMMERCIAL

## 2022-09-08 ENCOUNTER — OFFICE VISIT (OUTPATIENT)
Dept: PHYSICAL THERAPY | Facility: CLINIC | Age: 67
End: 2022-09-08
Payer: COMMERCIAL

## 2022-09-08 VITALS
WEIGHT: 201 LBS | HEIGHT: 69 IN | TEMPERATURE: 98.2 F | RESPIRATION RATE: 14 BRPM | HEART RATE: 84 BPM | BODY MASS INDEX: 29.77 KG/M2 | DIASTOLIC BLOOD PRESSURE: 70 MMHG | SYSTOLIC BLOOD PRESSURE: 130 MMHG

## 2022-09-08 DIAGNOSIS — M79.89 LEG SWELLING: ICD-10-CM

## 2022-09-08 DIAGNOSIS — M79.89 LEFT LEG SWELLING: ICD-10-CM

## 2022-09-08 DIAGNOSIS — M16.11 PRIMARY OSTEOARTHRITIS OF RIGHT HIP: Primary | ICD-10-CM

## 2022-09-08 DIAGNOSIS — M79.89 RIGHT LEG SWELLING: ICD-10-CM

## 2022-09-08 DIAGNOSIS — Z96.641 HISTORY OF RIGHT HIP REPLACEMENT: Primary | ICD-10-CM

## 2022-09-08 PROCEDURE — 99214 OFFICE O/P EST MOD 30 MIN: CPT | Performed by: STUDENT IN AN ORGANIZED HEALTH CARE EDUCATION/TRAINING PROGRAM

## 2022-09-08 PROCEDURE — 97110 THERAPEUTIC EXERCISES: CPT

## 2022-09-08 PROCEDURE — 97112 NEUROMUSCULAR REEDUCATION: CPT

## 2022-09-08 RX ORDER — FUROSEMIDE 20 MG/1
20 TABLET ORAL DAILY
Qty: 21 TABLET | Refills: 0 | Status: SHIPPED | OUTPATIENT
Start: 2022-09-08 | End: 2022-09-29

## 2022-09-08 NOTE — PROGRESS NOTES
Daily Note     Today's date: 2022  Patient name: Camacho Gallo  : 1955  MRN: [id number]  Referring provider: Maria Luisa rAmijo, *  Dx:   Encounter Diagnosis     ICD-10-CM    1  Primary osteoarthritis of right hip  M16 11                   Subjective: I just saw the Dr & he said to continue the water pills for 5 more days  Objective: See treatment diary below      Assessment: Tolerated treatment fair  Patient demonstrated fatigue post treatment, exhibited good technique with therapeutic exercises and would benefit from continued PT  Patients wife present during session  Patient ambulating using SPC  B LE edema decreased slightly  Plan: Progress treatment as tolerated         Precautions: THR right 22      Manuals 22  FOTO 08/08 8/11 8/15 2022    R hip PROM / stretching   Perf perf perf  perf Perf perf perf perf    Prone R hip stretch  Perf perf x 4 minutes w/ quad stretching  perf  perf x 4 minutes w/ quad stretching Perf in prone prone prone prone                             Neuro Re-Ed             Malorie balance walk outs  @#7 - 2 x 10 reps (lateral) @#7 - 2 x 10 reps (lateral) @#7 - 2 x 10 reps (lateral) @#7 - 2 x 10 reps (lateral)                                                             Standing hip ext / abd  ---- --- R 2# x 20 reps  R 2# x 20 reps      Standing hip ext x 20 reps     NuStep  10 min L3  Bike L3 Bike x 10 min L3 NuStep x 20 min L3  10 min L3 10 min L3 10 min L3 10 min L3 10 min L3   Ther Ex             Ankle pumps HEP  -- --     HEP    Quad sets HEP  --- --     ----    Glut sets HEP  --- ---     --    Heel slide --- ------ ----- ---  ------   ---    HR (standing) on  roll  20x 20x 20x 20x  -----   20x 20x   LSU/FSU  6" step x 10 reps  6" step x 10 reps  6" step x 10 reps each  6" step x 20 reps   6" x 20 reps leading w/ R 6" x 20 reps leading w/ R 6" x 20 reps  6" step x 10 reps  6" step x 10 reps    Lateral steps @rail  ----  YTB 5 laps ----- -----  4 laps 5 laps no UE support --- ---    Hip 3 way ---- YTB 2x10 ---- ---      YTB 20x ea   Lateral walking with resistance RTB x 4 laps  ----- ---      5 laps   SLR AROM 2 x 10 reps  AROM 2 x 10 reps  R/L 2x10 reps  2# AK 2 x 10 reps   4 x 5 reps AROM  4 x 5 reps AROM with PT assist 3x5 reps AAROM ; 5x AROM  AROM 3 x 5 reps  AROM 3 x 5 reps    Ther Activity             STS  20" mat x 20 reps w/ RTB abd  20" mat x 20 reps w/ RTB abd 1 blue foam on regular chair - 2 x 10 reps  w/RTB  abd Hi lo mat 2 x 10 reps   Blue foam on chair x 20 reps  Blue foam on chair x 20 reps  Blue foam on chair x 20 reps Blue foam on chair x 20 reps Blue foam on chair x 20 reps   S/L  clamshells  2 x 10 reps  2 x 10 reps ------ Supine clamshells GTB x 20 reps   Seated 30x RTB  Seated 30x RTB  30x seated RTB 30x GTB (seated) Supine 30x   Bridging 3x10 reps  3x10 reps  2x10 reps  2x10 reps    2x10 2x10 reps  2x10 reps  2x10 reps    Gait Training          ----                              Modalities                      10 min supine

## 2022-09-08 NOTE — PROGRESS NOTES
Clinic Visit Note  Jose Juan Cordero 78 y o  male   MRN: [id number]    Assessment and Plan      Diagnoses and all orders for this visit:    History of right hip replacement  No pain, doing well with physical therapy    Leg swelling, bilaterally  Lower extremity Doppler study no evidence of DVT bilaterally  Etiology most likely secondary to recent hip replacement  Continue Lasix 20 mg for another 5 days for resolution  Continue regular exercise, physical therapy  If swelling does not improve recommend further imaging, lab work  Patient to call in 5 days to see if swelling resolving   -     furosemide (LASIX) 20 mg tablet; Take 1 tablet (20 mg total) by mouth daily for 21 days        My impressions and treatment recommendations were discussed in detail with the patient who verbalized understanding and had no further questions  Discharge instructions were provided  Subjective     Chief Complaint:  Follow-up visit    History of Present Illness:    Patient is a pleasant 60-year-old gentleman coming in for follow-up on bilateral swelling of lower extremity  DVT ultrasound Doppler negative bilaterally  Responding well to low-dose Lasix  Recent right hip replacement  Denies shortness of breath, chest pain, palpitations  Does have history of DVT/PE in the past     The following portions of the patient's history were reviewed and updated as appropriate: allergies, current medications, past family history, past medical history, past social history, past surgical history and problem list     REVIEW OF SYSTEMS:  A complete 12-point review of systems is negative other than that noted in the HPI  Review of Systems   Constitutional: Negative for chills, fatigue and fever  Respiratory: Negative for cough, shortness of breath and wheezing  Cardiovascular: Positive for leg swelling  Negative for chest pain and palpitations  Neurological: Negative for dizziness and headaches           Current Outpatient Medications:     furosemide (LASIX) 20 mg tablet, Take 1 tablet (20 mg total) by mouth daily for 21 days, Disp: 21 tablet, Rfl: 0  Past Medical History:   Diagnosis Date    Arthritis      Past Surgical History:   Procedure Laterality Date    HIP SURGERY Right 07/18/2022    REPAIR QUADRICEPS / HAMSTRING MUSCLE Bilateral 2004     Social History     Socioeconomic History    Marital status: /Civil Union     Spouse name: Not on file    Number of children: Not on file    Years of education: Not on file    Highest education level: Not on file   Occupational History    Not on file   Tobacco Use    Smoking status: Never Smoker    Smokeless tobacco: Never Used   Substance and Sexual Activity    Alcohol use: Yes     Comment: occ  a beer every couple of weeks     Drug use: No    Sexual activity: Not on file   Other Topics Concern    Not on file   Social History Narrative    Not on file     Social Determinants of Health     Financial Resource Strain: Not on file   Food Insecurity: Not on file   Transportation Needs: Not on file   Physical Activity: Not on file   Stress: Not on file   Social Connections: Not on file   Intimate Partner Violence: Not on file   Housing Stability: Not on file     Family History   Problem Relation Age of Onset    Hypertension Mother     Heart failure Mother     Stroke Mother     Diabetes Mother     Hypertension Father     Heart failure Father     Heart attack Father     No Known Problems Sister     No Known Problems Brother     No Known Problems Maternal Aunt     No Known Problems Maternal Uncle     No Known Problems Paternal Aunt     No Known Problems Paternal Uncle     No Known Problems Maternal Grandmother     No Known Problems Maternal Grandfather     No Known Problems Paternal Grandmother     No Known Problems Paternal Grandfather     ADD / ADHD Neg Hx     Anesthesia problems Neg Hx     Cancer Neg Hx     Clotting disorder Neg Hx     Collagen disease Neg Hx     Dislocations Neg Hx     Learning disabilities Neg Hx     Neurological problems Neg Hx     Osteoporosis Neg Hx     Rheumatologic disease Neg Hx     Scoliosis Neg Hx     Vascular Disease Neg Hx      Allergies   Allergen Reactions    Aleve [Naproxen] Rash    Penicillins Rash     Reaction Date: [id number];        Objective     Vitals:    09/08/22 0904   BP: 130/70   BP Location: Left arm   Patient Position: Sitting   Cuff Size: Adult   Pulse: 84   Resp: 14   Temp: 98 2 °F (36 8 °C)   TempSrc: Temporal   Weight: 91 2 kg (201 lb)   Height: 5' 9" (1 753 m)       Physical Exam:     GENERAL: NAD, pleasant   HEENT:  NC/AT, PERRL, EOMI, no scleral icterus  CARDIAC:  RRR, +S1/S2, no S3/S4 appreciated, no m/g/r  PULMONARY:  CTA B/L, no wheezing/rales/rhonci, non-labored breathing  ABDOMEN:  Soft, NT/ND, no rebound/guarding/rigidity  Extremities:   2+ pitting edema bilaterally, cyanosis, or clubbing  Musculoskeletal:  Full range of motion intact in all extremities   NEUROLOGIC: Grossly intact, no focal deficits  SKIN:  No rashes or erythema noted on exposed skin  Psych: Normal affect, mood stable    ==  PLEASE NOTE:  This encounter was completed utilizing the [employer] Direct Speech Voice Recognition Software  Grammatical errors, random word insertions, pronoun errors and incomplete sentences are occasional consequences of the system due to software limitations, ambient noise and hardware issues  These may be missed by proof reading prior to affixing electronic signature  Any questions or concerns about the content, text or information contained within the body of this dictation should be directly addressed to the physician for clarification  Please do not hesitate to call me directly if you have any any questions or concerns      DO [facility]   [facility]

## 2022-09-12 ENCOUNTER — OFFICE VISIT (OUTPATIENT)
Dept: PHYSICAL THERAPY | Facility: CLINIC | Age: 67
End: 2022-09-12
Payer: COMMERCIAL

## 2022-09-12 ENCOUNTER — TELEPHONE (OUTPATIENT)
Dept: FAMILY MEDICINE CLINIC | Facility: CLINIC | Age: 67
End: 2022-09-12

## 2022-09-12 DIAGNOSIS — M16.11 PRIMARY OSTEOARTHRITIS OF RIGHT HIP: Primary | ICD-10-CM

## 2022-09-12 PROCEDURE — 97110 THERAPEUTIC EXERCISES: CPT

## 2022-09-12 PROCEDURE — 97112 NEUROMUSCULAR REEDUCATION: CPT

## 2022-09-12 NOTE — PROGRESS NOTES
Daily Note     Today's date: 2022  Patient name: Jose Juan Cordero  : 1955  MRN: [id number]  Referring provider: Deric Keller, *  Dx:   Encounter Diagnosis     ICD-10-CM    1  Primary osteoarthritis of right hip  M16 11        Start Time: 1055          Subjective: My leg swelling is going down  I finish the water pills today  "I drove here today "    Objective: See treatment diary below      Assessment: Tolerated treatment fair  Patient demonstrated fatigue post treatment, exhibited good technique with therapeutic exercises and would benefit from continued PT      Plan: Progress treatment as tolerated         Precautions: THR right 22      Manuals 8/29   09/01/22 9/6 9/8 9/12   8/11 8/15 2022    R hip PROM / stretching   Perf perf perf perf   perf perf perf    Prone R hip stretch  Perf perf x 4 minutes w/ quad stretching  perf perf   prone prone prone                             Neuro Re-Ed             Malorie balance walk outs  @#7 - 2 x 10 reps (lateral) @#7 - 2 x 10 reps (lateral) @#7 - 2 x 10 reps (lateral) @#7 - 2 x 10 reps (lateral) @#8 - 4 x 10 reps                                                             Standing hip ext / abd  ---- --- R 2# x 20 reps  R 2# x 20 reps  R 2# x 20 reps    Standing hip ext x 20 reps     NuStep  10 min L3  Bike L3 Bike x 10 min L3 NuStep x 20 min L3 NuStep 10 min L3   10 min L3 10 min L3 10 min L3   Ther Ex             Ankle pumps HEP  -- --     HEP    Quad sets HEP  --- --     ----    Glut sets HEP  --- ---     --    Heel slide --- ------ ----- ---  ------   ---    HR (standing) on  roll  20x 20x 20x 20x 20x -----   20x 20x   LSU/FSU  6" step x 10 reps  6" step x 10 reps  6" step x 10 reps each  6" step x 20 reps  6" step x 20 reps    6" x 20 reps  6" step x 10 reps  6" step x 10 reps    Lateral steps @rail  ----  YTB 5 laps ----- ----- -----   --- ---    Hip 3 way ---- YTB 2x10 ---- --- ---     YTB 20x ea   Lateral walking with resistance RTB x 4 laps  ----- --- ----     5 laps   SLR AROM 2 x 10 reps  AROM 2 x 10 reps  R/L 2x10 reps  2# AK 2 x 10 reps  2x10 reps    4 x 5 reps AROM with PT assist 3x5 reps AAROM ; 5x AROM  AROM 3 x 5 reps  AROM 3 x 5 reps    Ther Activity             STS  20" mat x 20 reps w/ RTB abd  20" mat x 20 reps w/ RTB abd 1 blue foam on regular chair - 2 x 10 reps  w/RTB  abd Hi lo mat 2 x 10 reps  Hi lo mat 2 x 10 reps   Blue foam on chair x 20 reps  Blue foam on chair x 20 reps Blue foam on chair x 20 reps Blue foam on chair x 20 reps   S/L  clamshells  2 x 10 reps  2 x 10 reps ------ Supine clamshells GTB x 20 reps  Supine clamshells GTB x 20 reps  Seated 30x RTB  30x seated RTB 30x GTB (seated) Supine 30x   Bridging 3x10 reps  3x10 reps  2x10 reps  2x10 reps  2x10 reps  2x10 2x10 reps  2x10 reps  2x10 reps    Gait Training          ----                              Modalities                      10 min supine

## 2022-09-12 NOTE — TELEPHONE ENCOUNTER
Dr Mayda Cancino:    Patient feels that the swelling in legs and upper thighs have improved  He wanted to know if he should continue taking Lasix and wearing compression socks? Please c/b to discuss further

## 2022-09-15 ENCOUNTER — OFFICE VISIT (OUTPATIENT)
Dept: PHYSICAL THERAPY | Facility: CLINIC | Age: 67
End: 2022-09-15
Payer: COMMERCIAL

## 2022-09-15 DIAGNOSIS — M16.11 PRIMARY OSTEOARTHRITIS OF RIGHT HIP: Primary | ICD-10-CM

## 2022-09-15 PROCEDURE — 97112 NEUROMUSCULAR REEDUCATION: CPT

## 2022-09-15 PROCEDURE — 97110 THERAPEUTIC EXERCISES: CPT

## 2022-09-15 NOTE — PROGRESS NOTES
Daily Note     Today's date: 9/15/2022  Patient name: Julia Rees  : 1955  MRN: [id number]  Referring provider: Hitesh Bradley, *  Dx:   Encounter Diagnosis     ICD-10-CM    1  Primary osteoarthritis of right hip  M16 11        Start Time: 1014          Subjective: The Dr said to stop the water pills & the compression socks & see how it goes  No difficulty with driving, I'm looking forward to play golf  Objective: See treatment diary below      Assessment: Tolerated treatment well  Patient demonstrated fatigue post treatment, exhibited good technique with therapeutic exercises and would benefit from continued PT  Patient arrives ambulating without assistive device  Plan: Progress treatment as tolerated         Precautions: THR right 22      Manuals 8/29   09/01/22 9/6 9/8 9/12 9/15  8/11 8/15 2022    R hip PROM / stretching   Perf perf perf perf perf  perf perf perf    Prone R hip stretch  Perf perf x 4 minutes w/ quad stretching  perf perf perf  prone prone prone                             Neuro Re-Ed             [place] balance walk outs  @#7 - 2 x 10 reps (lateral) @#7 - 2 x 10 reps (lateral) @#7 - 2 x 10 reps (lateral) @#7 - 2 x 10 reps (lateral) @#8 - 4 x 10 reps  @#9 - 4 x 10 reps                                                            Standing hip ext / abd  ---- --- R 2# x 20 reps  R 2# x 20 reps  R 2# x 20 reps ----   Standing hip ext x 20 reps     NuStep  10 min L3  Bike L3 Bike x 10 min L3 NuStep x 20 min L3 NuStep 10 min L3 NuStep 10 min L3   10 min L3 10 min L3   Ther Ex             Ankle pumps HEP  -- --     HEP    Quad sets HEP  --- --     ----    Glut sets HEP  --- ---     --    Heel slide --- ------ ----- ---  ---   ---    HR (standing) on  roll  20x 20x 20x 20x 20x 20x   20x 20x   LSU/FSU  6" step x 10 reps  6" step x 10 reps  6" step x 10 reps each  6" step x 20 reps  6" step x 20 reps  8" step x 20 reps    6" step x 10 reps  6" step x 10 reps    Lateral steps @rail  ----  YTB 5 laps ----- ----- ----- ---  --- ---    Hip 3 way ---- YTB 2x10 ---- --- --- ---    YTB 20x ea   Lateral walking with resistance RTB x 4 laps  ----- --- ---- ---    5 laps   SLR AROM 2 x 10 reps  AROM 2 x 10 reps  R/L 2x10 reps  2# AK 2 x 10 reps  2x10 reps   2# AK 2 x 10 reps   3x5 reps AAROM ; 5x AROM  AROM 3 x 5 reps  AROM 3 x 5 reps    Ther Activity             STS  20" mat x 20 reps w/ RTB abd  20" mat x 20 reps w/ RTB abd 1 blue foam on regular chair - 2 x 10 reps  w/RTB  abd Hi lo mat 2 x 10 reps  Hi lo mat 2 x 10 reps  Hi lo mat 2 x 10 reps   Blue foam on chair x 20 reps Blue foam on chair x 20 reps Blue foam on chair x 20 reps   S/L  clamshells  2 x 10 reps  2 x 10 reps ------ Supine clamshells GTB x 20 reps  Supine clamshells GTB x 20 reps Supine clamshells blueTB x 20 reps  30x seated RTB 30x GTB (seated) Supine 30x   Bridging 3x10 reps  3x10 reps  2x10 reps  2x10 reps  2x10 reps 2x10 reps  2x10 reps  2x10 reps  2x10 reps    Gait Training          ----                              Modalities

## 2022-09-20 ENCOUNTER — OFFICE VISIT (OUTPATIENT)
Dept: PHYSICAL THERAPY | Facility: CLINIC | Age: 67
End: 2022-09-20
Payer: COMMERCIAL

## 2022-09-20 DIAGNOSIS — M16.11 PRIMARY OSTEOARTHRITIS OF RIGHT HIP: Primary | ICD-10-CM

## 2022-09-20 PROCEDURE — 97112 NEUROMUSCULAR REEDUCATION: CPT

## 2022-09-20 PROCEDURE — 97110 THERAPEUTIC EXERCISES: CPT

## 2022-09-20 NOTE — PROGRESS NOTES
Daily Note     Today's date: 2022  Patient name: Dolores Ibrahim  : 1955  MRN: [id number]  Referring provider: Annamarie Mcduffie, *  Dx:   Encounter Diagnosis     ICD-10-CM    1  Primary osteoarthritis of right hip  M16 11        Start Time: 1015          Subjective: My hip feels stiff after sitting  I've been elevating my legs above my heart & massaging them & that seems to be getting rid of the selling  Objective: See treatment diary below      Assessment: Tolerated treatment fair  Patient demonstrated fatigue post treatment, exhibited good technique with therapeutic exercises and would benefit from continued PT      Plan: Progress treatment as tolerated         Precautions: THR right 22      Manuals 8/29   09/01/22 9/6 9/8 9/12 9/15 9/20  8/15 2022    R hip PROM / stretching   Perf perf perf perf perf perf  perf perf    Prone R hip stretch  Perf perf x 4 minutes w/ quad stretching  perf perf perf perf  prone prone                             Neuro Re-Ed             Malorie balance walk outs  @#7 - 2 x 10 reps (lateral) @#7 - 2 x 10 reps (lateral) @#7 - 2 x 10 reps (lateral) @#7 - 2 x 10 reps (lateral) @#8 - 4 x 10 reps  @#9 - 4 x 10 reps  @#10 - 4 x 10 reps                                                           Standing hip ext / abd  ---- --- R 2# x 20 reps  R 2# x 20 reps  R 2# x 20 reps ---- ----  Standing hip ext x 20 reps     NuStep  10 min L3  Bike L3 Bike x 10 min L3 NuStep x 20 min L3 NuStep 10 min L3 NuStep 10 min L3 Bike x 10 min (seat #6)  10 min L3 10 min L3   Ther Ex             Ankle pumps HEP  -- --   ---  HEP    Quad sets HEP  --- --   ---  ----    Glut sets HEP  --- ---   ---  --    Heel slide --- ------ ----- ---  --- ---  ---    HR (standing) on  roll  20x 20x 20x 20x 20x 20x 20x  20x 20x   LSU/FSU  6" step x 10 reps  6" step x 10 reps  6" step x 10 reps each  6" step x 20 reps  6" step x 20 reps  8" step x 20 reps  8" step x 20 reps   6" step x 10 reps  6" step x 10 reps    Lateral steps @rail  ----  YTB 5 laps ----- ----- ----- --- ---- --- ---    Hip 3 way ---- YTB 2x10 ---- --- --- --- --- ----  YTB 20x ea   Lateral walking with resistance RTB x 4 laps  ----- --- ---- --- ---- ----  5 laps   SLR AROM 2 x 10 reps  AROM 2 x 10 reps  R/L 2x10 reps  2# AK 2 x 10 reps  2x10 reps   2# AK 2 x 10 reps  2# AK 2 x 10 reps ---- AROM 3 x 5 reps  AROM 3 x 5 reps    Ther Activity             STS  20" mat x 20 reps w/ RTB abd  20" mat x 20 reps w/ RTB abd 1 blue foam on regular chair - 2 x 10 reps  w/RTB  abd Hi lo mat 2 x 10 reps  Hi lo mat 2 x 10 reps  Hi lo mat 2 x 10 reps  Hi lo mat 2 x 10 reps ---- Blue foam on chair x 20 reps Blue foam on chair x 20 reps   S/L  clamshells  2 x 10 reps  2 x 10 reps ------ Supine clamshells GTB x 20 reps  Supine clamshells GTB x 20 reps Supine clamshells blueTB x 20 reps 20x black TB  30x GTB (seated) Supine 30x   Bridging 3x10 reps  3x10 reps  2x10 reps  2x10 reps  2x10 reps 2x10 reps 2x10 reps   2x10 reps  2x10 reps    Gait Training          ----                              Modalities

## 2022-09-22 ENCOUNTER — OFFICE VISIT (OUTPATIENT)
Dept: PHYSICAL THERAPY | Facility: CLINIC | Age: 67
End: 2022-09-22
Payer: COMMERCIAL

## 2022-09-22 DIAGNOSIS — M16.11 PRIMARY OSTEOARTHRITIS OF RIGHT HIP: Primary | ICD-10-CM

## 2022-09-22 PROCEDURE — 97110 THERAPEUTIC EXERCISES: CPT

## 2022-09-22 PROCEDURE — 97112 NEUROMUSCULAR REEDUCATION: CPT

## 2022-09-22 NOTE — PROGRESS NOTES
Daily Note     Today's date: 2022  Patient name: Morena Hampton  : 1955  MRN: [id number]  Referring provider: Yinka Pérez, *  Dx:   Encounter Diagnosis     ICD-10-CM    1  Primary osteoarthritis of right hip  M16 11        Start Time: 1145          Subjective: I was able to mow the grass yesterday, pushing the   Objective: See treatment diary below      Assessment: Tolerated treatment well  Patient demonstrated fatigue post treatment, exhibited good technique with therapeutic exercises and would benefit from continued PT      Plan: Progress treatment as tolerated         Precautions: THR right 22      Manuals 8/29   09/01/22 9/6 9/8 9/12 9/15 9/22  8/15 2022    R hip PROM / stretching   Perf perf perf perf perf perf  perf perf    Prone R hip stretch  Perf perf x 4 minutes w/ quad stretching  perf perf perf perf  prone prone                             Neuro Re-Ed             Malorie balance walk outs  @#7 - 2 x 10 reps (lateral) @#7 - 2 x 10 reps (lateral) @#7 - 2 x 10 reps (lateral) @#7 - 2 x 10 reps (lateral) @#8 - 4 x 10 reps  @#9 - 4 x 10 reps  @#10 - 4 x 10 reps                                                           Standing hip ext / abd  ---- --- R 2# x 20 reps  R 2# x 20 reps  R 2# x 20 reps ---- ----  Standing hip ext x 20 reps     NuStep  10 min L3  Bike L3 Bike x 10 min L3 NuStep x 20 min L3 NuStep 10 min L3 NuStep 10 min L3 Bike x 10 min L3 (seat #6)  10 min L3 10 min L3   Ther Ex             Ankle pumps HEP  -- --   ---  HEP    Quad sets HEP  --- --   ---  ----    Glut sets HEP  --- ---   ---  --    Heel slide --- ------ ----- ---  --- ---  ---    HR (standing) on  roll  20x 20x 20x 20x 20x 20x 20x  20x 20x   LSU/FSU  6" step x 10 reps  6" step x 10 reps  6" step x 10 reps each  6" step x 20 reps  6" step x 20 reps  8" step x 20 reps  ---  6" step x 10 reps  6" step x 10 reps    Lateral steps @rail  ----  YTB 5 laps ----- ----- ----- --- ---- --- --- Hip 3 way ---- YTB 2x10 ---- --- --- --- --- ----  YTB 20x ea   Lateral walking with resistance RTB x 4 laps  ----- --- ---- --- ---- ----  5 laps   SLR AROM 2 x 10 reps  AROM 2 x 10 reps  R/L 2x10 reps  2# AK 2 x 10 reps  2x10 reps   2# AK 2 x 10 reps  2# AK 2 x 10 reps ---- AROM 3 x 5 reps  AROM 3 x 5 reps    Ther Activity             STS  20" mat x 20 reps w/ RTB abd  20" mat x 20 reps w/ RTB abd 1 blue foam on regular chair - 2 x 10 reps  w/RTB  abd Hi lo mat 2 x 10 reps  Hi lo mat 2 x 10 reps  Hi lo mat 2 x 10 reps  Hi lo mat 2 x 10 reps ---- Blue foam on chair x 20 reps Blue foam on chair x 20 reps   S/L  clamshells  2 x 10 reps  2 x 10 reps ------ Supine clamshells GTB x 20 reps  Supine clamshells GTB x 20 reps Supine clamshells blueTB x 20 reps S/L clamshells 2# AK 2x10 reps   30x GTB (seated) Supine 30x   Bridging 3x10 reps  3x10 reps  2x10 reps  2x10 reps  2x10 reps 2x10 reps 2x10 reps   2x10 reps  2x10 reps    Gait Training          ----                              Modalities

## 2022-09-24 ENCOUNTER — APPOINTMENT (EMERGENCY)
Dept: RADIOLOGY | Facility: HOSPITAL | Age: 67
End: 2022-09-24
Payer: COMMERCIAL

## 2022-09-24 ENCOUNTER — HOSPITAL ENCOUNTER (EMERGENCY)
Facility: HOSPITAL | Age: 67
Discharge: HOME | End: 2022-09-24
Attending: EMERGENCY MEDICINE
Payer: COMMERCIAL

## 2022-09-24 VITALS
HEART RATE: 89 BPM | DIASTOLIC BLOOD PRESSURE: 77 MMHG | SYSTOLIC BLOOD PRESSURE: 161 MMHG | RESPIRATION RATE: 14 BRPM | OXYGEN SATURATION: 100 % | TEMPERATURE: 98.4 F | WEIGHT: 195.55 LBS | BODY MASS INDEX: 28.88 KG/M2

## 2022-09-24 DIAGNOSIS — R42 VERTIGO: Primary | ICD-10-CM

## 2022-09-24 LAB
2HR DELTA HS TROPONIN: 4 NG/L
ALBUMIN SERPL BCP-MCNC: 3.8 G/DL (ref 3.5–5)
ALP SERPL-CCNC: 117 U/L (ref 46–116)
ALT SERPL W P-5'-P-CCNC: 30 U/L (ref 12–78)
ANION GAP SERPL CALCULATED.3IONS-SCNC: 9 MMOL/L (ref 4–13)
AST SERPL W P-5'-P-CCNC: 26 U/L (ref 5–45)
ATRIAL RATE: 82 BPM
BASOPHILS # BLD AUTO: 0.04 THOUSANDS/ΜL (ref 0–0.1)
BASOPHILS NFR BLD AUTO: 1 % (ref 0–1)
BILIRUB SERPL-MCNC: 0.32 MG/DL (ref 0.2–1)
BUN SERPL-MCNC: 15 MG/DL (ref 5–25)
CALCIUM SERPL-MCNC: 8.2 MG/DL (ref 8.3–10.1)
CARDIAC TROPONIN I PNL SERPL HS: 11 NG/L
CARDIAC TROPONIN I PNL SERPL HS: 15 NG/L
CHLORIDE SERPL-SCNC: 105 MMOL/L (ref 96–108)
CO2 SERPL-SCNC: 28 MMOL/L (ref 21–32)
CREAT SERPL-MCNC: 1.2 MG/DL (ref 0.6–1.3)
EOSINOPHIL # BLD AUTO: 0.24 THOUSAND/ΜL (ref 0–0.61)
EOSINOPHIL NFR BLD AUTO: 4 % (ref 0–6)
ERYTHROCYTE [DISTWIDTH] IN BLOOD BY AUTOMATED COUNT: 14 % (ref 11.6–15.1)
GFR SERPL CREATININE-BSD FRML MDRD: 62 ML/MIN/1.73SQ M
GLUCOSE SERPL-MCNC: 134 MG/DL (ref 65–140)
HCT VFR BLD AUTO: 32.9 % (ref 36.5–49.3)
HGB BLD-MCNC: 10.9 G/DL (ref 12–17)
IMM GRANULOCYTES # BLD AUTO: 0.02 THOUSAND/UL (ref 0–0.2)
IMM GRANULOCYTES NFR BLD AUTO: 0 % (ref 0–2)
LYMPHOCYTES # BLD AUTO: 0.99 THOUSANDS/ΜL (ref 0.6–4.47)
LYMPHOCYTES NFR BLD AUTO: 17 % (ref 14–44)
MCH RBC QN AUTO: 31.1 PG (ref 26.8–34.3)
MCHC RBC AUTO-ENTMCNC: 33.1 G/DL (ref 31.4–37.4)
MCV RBC AUTO: 94 FL (ref 82–98)
MONOCYTES # BLD AUTO: 0.65 THOUSAND/ΜL (ref 0.17–1.22)
MONOCYTES NFR BLD AUTO: 11 % (ref 4–12)
NEUTROPHILS # BLD AUTO: 3.98 THOUSANDS/ΜL (ref 1.85–7.62)
NEUTS SEG NFR BLD AUTO: 67 % (ref 43–75)
NRBC BLD AUTO-RTO: 0 /100 WBCS
P AXIS: 71 DEGREES
PLATELET # BLD AUTO: 233 THOUSANDS/UL (ref 149–390)
PMV BLD AUTO: 10 FL (ref 8.9–12.7)
POTASSIUM SERPL-SCNC: 3.3 MMOL/L (ref 3.5–5.3)
PR INTERVAL: 164 MS
PROT SERPL-MCNC: 7.2 G/DL (ref 6.4–8.4)
QRS AXIS: 31 DEGREES
QRSD INTERVAL: 78 MS
QT INTERVAL: 394 MS
QTC INTERVAL: 460 MS
RBC # BLD AUTO: 3.51 MILLION/UL (ref 3.88–5.62)
SODIUM SERPL-SCNC: 142 MMOL/L (ref 135–147)
T WAVE AXIS: 33 DEGREES
VENTRICULAR RATE: 82 BPM
WBC # BLD AUTO: 5.92 THOUSAND/UL (ref 4.31–10.16)

## 2022-09-24 PROCEDURE — 93010 ELECTROCARDIOGRAM REPORT: CPT | Performed by: INTERNAL MEDICINE

## 2022-09-24 PROCEDURE — 80053 COMPREHEN METABOLIC PANEL: CPT | Performed by: EMERGENCY MEDICINE

## 2022-09-24 PROCEDURE — 99284 EMERGENCY DEPT VISIT MOD MDM: CPT

## 2022-09-24 PROCEDURE — 70496 CT ANGIOGRAPHY HEAD: CPT

## 2022-09-24 PROCEDURE — 70498 CT ANGIOGRAPHY NECK: CPT

## 2022-09-24 PROCEDURE — 93005 ELECTROCARDIOGRAM TRACING: CPT

## 2022-09-24 PROCEDURE — 84484 ASSAY OF TROPONIN QUANT: CPT | Performed by: EMERGENCY MEDICINE

## 2022-09-24 PROCEDURE — 85025 COMPLETE CBC W/AUTO DIFF WBC: CPT | Performed by: EMERGENCY MEDICINE

## 2022-09-24 PROCEDURE — 36415 COLL VENOUS BLD VENIPUNCTURE: CPT | Performed by: EMERGENCY MEDICINE

## 2022-09-24 PROCEDURE — 99285 EMERGENCY DEPT VISIT HI MDM: CPT | Performed by: EMERGENCY MEDICINE

## 2022-09-24 RX ORDER — MECLIZINE HYDROCHLORIDE 25 MG/1
25 TABLET ORAL ONCE
Status: COMPLETED | OUTPATIENT
Start: 2022-09-24 | End: 2022-09-24

## 2022-09-24 RX ORDER — ONDANSETRON 4 MG/1
4 TABLET, ORALLY DISINTEGRATING ORAL EVERY 8 HOURS PRN
Qty: 20 TABLET | Refills: 0 | Status: SHIPPED | OUTPATIENT
Start: 2022-09-24 | End: 2022-09-29

## 2022-09-24 RX ORDER — MECLIZINE HYDROCHLORIDE 25 MG/1
25 TABLET ORAL EVERY 6 HOURS PRN
Qty: 30 TABLET | Refills: 0 | Status: SHIPPED | OUTPATIENT
Start: 2022-09-24 | End: 2022-09-29

## 2022-09-24 RX ADMIN — IOHEXOL 85 ML: 350 INJECTION, SOLUTION INTRAVENOUS at 03:56

## 2022-09-24 RX ADMIN — MECLIZINE HYDROCHLORIDE 25 MG: 25 TABLET ORAL at 02:54

## 2022-09-24 RX ADMIN — MECLIZINE HYDROCHLORIDE 25 MG: 25 TABLET ORAL at 02:25

## 2022-09-24 NOTE — ED PROVIDER NOTES
History  Chief Complaint   Patient presents with    Dizziness     Patient c/o going to bed and 15 minutes later became very dizzy and lightheaded, alert and oriented x4, got sweaty, dizzy and vomited     Patient is a 78-year-old male  No prior history of stroke  About 9 weeks ago he had a right hip replacement  He has had problem with swelling to both was lower extremities  He has been placed on Lasix  Ultrasound has been negative for DVT  Tonight he was watching news around 11:00 p m     All the sudden he developed vertigo  It was associated with diaphoresis, nausea and vomiting  It was somewhat positional   No associated speech or visual complaints  No lateralizing motor or sensory deficits  Symptoms are moderately severe  Worse with position change  Patient wears hearing aids  Otherwise he has no ear complaints  No recent sinus complaints  No tinnitus  Prior to Admission Medications   Prescriptions Last Dose Informant Patient Reported?  Taking?   furosemide (LASIX) 20 mg tablet Unknown at Unknown time  No No   Sig: Take 1 tablet (20 mg total) by mouth daily for 21 days      Facility-Administered Medications: None       Past Medical History:   Diagnosis Date    Arthritis        Past Surgical History:   Procedure Laterality Date    HIP SURGERY Right 07/18/2022    REPAIR QUADRICEPS / HAMSTRING MUSCLE Bilateral 2004       Family History   Problem Relation Age of Onset    Hypertension Mother     Heart failure Mother     Stroke Mother     Diabetes Mother     Hypertension Father     Heart failure Father     Heart attack Father     No Known Problems Sister     No Known Problems Brother     No Known Problems Maternal Aunt     No Known Problems Maternal Uncle     No Known Problems Paternal Aunt     No Known Problems Paternal Uncle     No Known Problems Maternal Grandmother     No Known Problems Maternal Grandfather     No Known Problems Paternal Grandmother     No Known Problems Paternal Grandfather     ADD / ADHD Neg Hx     Anesthesia problems Neg Hx     Cancer Neg Hx     Clotting disorder Neg Hx     Collagen disease Neg Hx     Dislocations Neg Hx     Learning disabilities Neg Hx     Neurological problems Neg Hx     Osteoporosis Neg Hx     Rheumatologic disease Neg Hx     Scoliosis Neg Hx     Vascular Disease Neg Hx      I have reviewed and agree with the history as documented  E-Cigarette/Vaping    E-Cigarette Use Never User      E-Cigarette/Vaping Substances     Social History     Tobacco Use    Smoking status: Never Smoker    Smokeless tobacco: Never Used   Vaping Use    Vaping Use: Never used   Substance Use Topics    Alcohol use: Yes     Comment: occ  a beer every couple of weeks     Drug use: No       Review of Systems   Constitutional: Negative for chills and fever  HENT: Negative for rhinorrhea and sore throat  Eyes: Negative for pain, redness and visual disturbance  Respiratory: Negative for cough and shortness of breath  Cardiovascular: Negative for chest pain and leg swelling  Gastrointestinal: Positive for nausea and vomiting  Negative for abdominal pain and diarrhea  Endocrine: Negative for polydipsia and polyuria  Genitourinary: Negative for dysuria, frequency and hematuria  Musculoskeletal: Negative for back pain and neck pain  Skin: Negative for rash and wound  Allergic/Immunologic: Negative for immunocompromised state  Neurological: Positive for dizziness  Negative for weakness, numbness and headaches  Psychiatric/Behavioral: Negative for hallucinations and suicidal ideas  All other systems reviewed and are negative  Physical Exam  Physical Exam  Vitals reviewed  Constitutional:       General: He is not in acute distress  Appearance: He is not toxic-appearing  HENT:      Head: Normocephalic and atraumatic        Right Ear: Tympanic membrane normal       Left Ear: Tympanic membrane normal       Nose: Nose normal  Mouth/Throat:      Mouth: Mucous membranes are moist    Eyes:      General:         Right eye: No discharge  Left eye: No discharge  Extraocular Movements: Extraocular movements intact  Conjunctiva/sclera: Conjunctivae normal       Pupils: Pupils are equal, round, and reactive to light  Comments: No nystagmus   Cardiovascular:      Rate and Rhythm: Normal rate and regular rhythm  Pulses: Normal pulses  Heart sounds: Normal heart sounds  No murmur heard  No friction rub  No gallop  Pulmonary:      Effort: Pulmonary effort is normal  No respiratory distress  Breath sounds: Normal breath sounds  No stridor  No wheezing, rhonchi or rales  Abdominal:      General: Bowel sounds are normal  There is no distension  Palpations: Abdomen is soft  Tenderness: There is no abdominal tenderness  There is no right CVA tenderness, left CVA tenderness, guarding or rebound  Musculoskeletal:         General: Swelling present  No tenderness, deformity or signs of injury  Normal range of motion  Cervical back: Normal range of motion and neck supple  No rigidity  Right lower leg: Edema present  Left lower leg: Edema present  Comments: No calf pain or unilateral leg swelling  There is 1+ pitting edema to both lower extremities  Skin:     General: Skin is warm and dry  Coloration: Skin is not jaundiced or pale  Findings: No bruising, erythema or rash  Neurological:      General: No focal deficit present  Mental Status: He is alert and oriented to person, place, and time  GCS: GCS eye subscore is 4  GCS verbal subscore is 5  GCS motor subscore is 6  Cranial Nerves: Cranial nerves are intact  No cranial nerve deficit or facial asymmetry  Sensory: Sensation is intact  No sensory deficit  Motor: Motor function is intact  No weakness  Coordination: Coordination is intact   Coordination normal  Finger-Nose-Finger Test and Heel to Júnior Larsen Test normal    Psychiatric:         Mood and Affect: Mood normal          Behavior: Behavior normal          Vital Signs  ED Triage Vitals [09/24/22 0147]   Temperature Pulse Respirations Blood Pressure SpO2   98 4 °F (36 9 °C) 92 20 (!) 176/97 100 %      Temp Source Heart Rate Source Patient Position - Orthostatic VS BP Location FiO2 (%)   Oral Monitor Lying Left arm --      Pain Score       --           Vitals:    09/24/22 0147 09/24/22 0300 09/24/22 0330   BP: (!) 176/97 (!) 184/87 167/91   Pulse: 92 76 81   Patient Position - Orthostatic VS: Lying           Visual Acuity      ED Medications  Medications   meclizine (ANTIVERT) tablet 25 mg (25 mg Oral Given 9/24/22 0225)   meclizine (ANTIVERT) tablet 25 mg (25 mg Oral Given 9/24/22 0254)   iohexol (OMNIPAQUE) 350 MG/ML injection (SINGLE-DOSE) 100 mL (85 mL Intravenous Given 9/24/22 0356)       Diagnostic Studies  Results Reviewed     Procedure Component Value Units Date/Time    HS Troponin I 2hr [[id number]]  (Normal) Collected: 09/24/22 0431    Lab Status: Final result Specimen: Blood from Arm, Left Updated: 09/24/22 0500     hs TnI 2hr 15 ng/L      Delta 2hr hsTnI 4 ng/L     HS Troponin I 4hr [[id number]]     Lab Status: No result Specimen: Blood     HS Troponin 0hr (reflex protocol) [[id number]]  (Normal) Collected: 09/24/22 0219    Lab Status: Final result Specimen: Blood from Arm, Left Updated: 09/24/22 0250     hs TnI 0hr 11 ng/L     Comprehensive metabolic panel [[id number]]  (Abnormal) Collected: 09/24/22 0219    Lab Status: Final result Specimen: Blood from Arm, Left Updated: 09/24/22 0243     Sodium 142 mmol/L      Potassium 3 3 mmol/L      Chloride 105 mmol/L      CO2 28 mmol/L      ANION GAP 9 mmol/L      BUN 15 mg/dL      Creatinine 1 20 mg/dL      Glucose 134 mg/dL      Calcium 8 2 mg/dL      AST 26 U/L      ALT 30 U/L      Alkaline Phosphatase 117 U/L      Total Protein 7 2 g/dL      Albumin 3 8 g/dL      Total Bilirubin 0 32 mg/dL eGFR 62 ml/min/1 73sq m     Narrative:      [place] guidelines for Chronic Kidney Disease (CKD):     Stage 1 with normal or high GFR (GFR > 90 mL/min/1 73 square meters)    Stage 2 Mild CKD (GFR = 60-89 mL/min/1 73 square meters)    Stage 3A Moderate CKD (GFR = 45-59 mL/min/1 73 square meters)    Stage 3B Moderate CKD (GFR = 30-44 mL/min/1 73 square meters)    Stage 4 Severe CKD (GFR = 15-29 mL/min/1 73 square meters)    Stage 5 End Stage CKD (GFR <15 mL/min/1 73 square meters)  Note: GFR calculation is accurate only with a steady state creatinine    CBC and differential [[id number]]  (Abnormal) Collected: 09/24/22 0219    Lab Status: Final result Specimen: Blood from Arm, Left Updated: 09/24/22 0225     WBC 5 92 Thousand/uL      RBC 3 51 Million/uL      Hemoglobin 10 9 g/dL      Hematocrit 32 9 %      MCV 94 fL      MCH 31 1 pg      MCHC 33 1 g/dL      RDW 14 0 %      MPV 10 0 fL      Platelets 968 Thousands/uL      nRBC 0 /100 WBCs      Neutrophils Relative 67 %      Immat GRANS % 0 %      Lymphocytes Relative 17 %      Monocytes Relative 11 %      Eosinophils Relative 4 %      Basophils Relative 1 %      Neutrophils Absolute 3 98 Thousands/µL      Immature Grans Absolute 0 02 Thousand/uL      Lymphocytes Absolute 0 99 Thousands/µL      Monocytes Absolute 0 65 Thousand/µL      Eosinophils Absolute 0 24 Thousand/µL      Basophils Absolute 0 04 Thousands/µL                  CTA head and neck with and without contrast   Final Result by Shaji Godoy MD (09/24 0423)      No acute intracranial abnormality  No evidence of flow-limiting stenosis or large vessel occlusive disease in the cervical or intracranial vasculature  No aneurysm              Workstation performed: [id number]                    Procedures  ECG 12 Lead Documentation Only    Date/Time: 9/24/2022 2:20 AM  Performed by: Thomas Bustos MD  Authorized by: Thomas Bustos MD     ECG reviewed by me, the ED Provider: yes    Patient location:  ED  Interpretation:     Interpretation: normal    Rate:     ECG rate assessment: normal    Rhythm:     Rhythm: sinus rhythm    Ectopy:     Ectopy: none    QRS:     QRS axis:  Normal  Conduction:     Conduction: normal    ST segments:     ST segments:  Normal  T waves:     T waves: normal               ED Course                               SBIRT 22yo+    Flowsheet Row Most Recent Value   SBIRT (22 yo +)    In order to provide better care to our patients, we are screening all of our patients for alcohol and drug use  Would it be okay to ask you these screening questions? No Filed at: 09/24/2022 0213                    MDM  Number of Diagnoses or Management Options  Diagnosis management comments: CTA of the head neck was negative  Physical exam did not have any signs suggestive of a cerebellar stroke  Patient felt better with ED treatment  This is vertigo, most likely of a peripheral etiology  There is no acute myocardial infarction  Appropriate for discharge and outpatient management  Amount and/or Complexity of Data Reviewed  Clinical lab tests: ordered and reviewed  Tests in the radiology section of CPT®: ordered and reviewed  Independent visualization of images, tracings, or specimens: yes        Disposition  Final diagnoses:   Vertigo     Time reflects when diagnosis was documented in both MDM as applicable and the Disposition within this note     Time User Action Codes Description Comment    9/24/2022  5:22 AM Issa Smart Add [R42] Vertigo       ED Disposition     ED Disposition   Discharge    Condition   Stable    Date/Time   Sat Sep 24, 2022  5:22 AM    Comment   [place] Goldberg discharge to home/self care                 Follow-up Information     Follow up With Specialties Details Why Contact Info Additional Information    [facility] In 1 week As needed [address] [facility] [address] [facility] [address]  [address] [facility]EL[facility], [address] [place], [place], [address], [phone]          Patient's Medications   Discharge Prescriptions    MECLIZINE (ANTIVERT) 25 MG TABLET    Take 1 tablet (25 mg total) by mouth every 6 (six) hours as needed for dizziness       Start Date: 9/24/2022 End Date: --       Order Dose: 25 mg       Quantity: 30 tablet    Refills: 0    ONDANSETRON (ZOFRAN-ODT) 4 MG DISINTEGRATING TABLET    Take 1 tablet (4 mg total) by mouth every 8 (eight) hours as needed for nausea or vomiting       Start Date: 9/24/2022 End Date: --       Order Dose: 4 mg       Quantity: 20 tablet    Refills: 0       No discharge procedures on file      PDMP Review     None          ED Provider  Electronically Signed by           Moncho Mcgrath MD  09/24/22 4884

## 2022-09-24 NOTE — ED NOTES
Medicated patient with Antivert; patient immediately began vomiting after taking       Steve Álvarez RN  09/24/22 9798

## 2022-09-26 ENCOUNTER — TELEPHONE (OUTPATIENT)
Dept: FAMILY MEDICINE CLINIC | Facility: CLINIC | Age: 67
End: 2022-09-26

## 2022-09-26 NOTE — TELEPHONE ENCOUNTER
Pt states his leg swelling has gone down  However, his calves swell up when he walks  Would like you to call him regarding an incident that happened over the weekend    Please review test results

## 2022-09-27 ENCOUNTER — OFFICE VISIT (OUTPATIENT)
Dept: PHYSICAL THERAPY | Facility: CLINIC | Age: 67
End: 2022-09-27
Payer: COMMERCIAL

## 2022-09-27 DIAGNOSIS — M16.11 PRIMARY OSTEOARTHRITIS OF RIGHT HIP: Primary | ICD-10-CM

## 2022-09-27 PROCEDURE — 97110 THERAPEUTIC EXERCISES: CPT

## 2022-09-27 PROCEDURE — 97112 NEUROMUSCULAR REEDUCATION: CPT

## 2022-09-27 NOTE — PROGRESS NOTES
Daily Note     Today's date: 2022  Patient name: Tomás Ortiz  : 1955  MRN: [id number]  Referring provider: Noel Laboy, *  Dx:   Encounter Diagnosis     ICD-10-CM    1  Primary osteoarthritis of right hip  M16 11        Start Time: 1145          Subjective: My R hip is feeling much better  I have to work everyday to keep the swelling down in my legs  I see my PCP this Thursday  Objective: See treatment diary below      Assessment: Tolerated treatment well  Patient exhibited good technique with therapeutic exercises and would benefit from continued PT      Plan: Progress treatment as tolerated         Precautions: THR right 22      Manuals 8/29   09/01/22 9/6 9/8 9/12 9/15 9/22 9/27  2022    R hip PROM / stretching   Perf perf perf perf perf perf perf  perf    Prone R hip stretch  Perf perf x 4 minutes w/ quad stretching  perf perf perf perf Prone   prone                             Neuro Re-Ed             [employer] balance walk outs  @#7 - 2 x 10 reps (lateral) @#7 - 2 x 10 reps (lateral) @#7 - 2 x 10 reps (lateral) @#7 - 2 x 10 reps (lateral) @#8 - 4 x 10 reps  @#9 - 4 x 10 reps  @#10 - 4 x 10 reps  @#10 - 4 x 10 reps                                                          Standing hip ext / abd  ---- --- R 2# x 20 reps  R 2# x 20 reps  R 2# x 20 reps ---- ---- ----     NuStep  10 min L3  Bike L3 Bike x 10 min L3 NuStep x 20 min L3 NuStep 10 min L3 NuStep 10 min L3 Bike x 10 min L3 (seat #6) Bike x 10 min L3 (seat #6)  10 min L3   Ther Ex             Ankle pumps HEP  -- --   ---  HEP    Quad sets HEP  --- --   ---  ----    Glut sets HEP  --- ---   ---  --    Heel slide --- ------ ----- ---  --- ---  ---    HR (standing) on  roll  20x 20x 20x 20x 20x 20x 20x 20x  20x   LSU/FSU  6" step x 10 reps  6" step x 10 reps  6" step x 10 reps each  6" step x 20 reps  6" step x 20 reps  8" step x 20 reps  --- 8" step x 20 reps  6" step x 10 reps    Lateral steps @rail  ----  YTB 5 laps ----- ----- ----- --- ---- ---     Hip 3 way ---- YTB 2x10 ---- --- --- --- --- ----  YTB 20x ea   Lateral walking with resistance RTB x 4 laps  ----- --- ---- --- ---- ----  5 laps   SLR AROM 2 x 10 reps  AROM 2 x 10 reps  R/L 2x10 reps  2# AK 2 x 10 reps  2x10 reps   2# AK 2 x 10 reps  2# AK 2 x 10 reps 2# AK 2 x 10 reps  AROM 3 x 5 reps    Ther Activity             STS  20" mat x 20 reps w/ RTB abd  20" mat x 20 reps w/ RTB abd 1 blue foam on regular chair - 2 x 10 reps  w/RTB  abd Hi lo mat 2 x 10 reps  Hi lo mat 2 x 10 reps  Hi lo mat 2 x 10 reps  Hi lo mat 2 x 10 reps Hi lo mat 2 x 10 reps  Blue foam on chair x 20 reps   S/L  clamshells  2 x 10 reps  2 x 10 reps ------ Supine clamshells GTB x 20 reps  Supine clamshells GTB x 20 reps Supine clamshells blueTB x 20 reps S/L clamshells 2# AK 2x10 reps  S/L clamshells 2# AK 2x10 reps   Supine 30x   Bridging 3x10 reps  3x10 reps  2x10 reps  2x10 reps  2x10 reps 2x10 reps 2x10 reps  2x10 reps   2x10 reps    Gait Training                                        Modalities

## 2022-09-27 NOTE — TELEPHONE ENCOUNTER
Lmom for pt to schedule ER follow up  I didn't see discharge notification prior to taking his phone call

## 2022-09-29 ENCOUNTER — OFFICE VISIT (OUTPATIENT)
Dept: PHYSICAL THERAPY | Facility: CLINIC | Age: 67
End: 2022-09-29
Payer: COMMERCIAL

## 2022-09-29 ENCOUNTER — OFFICE VISIT (OUTPATIENT)
Dept: FAMILY MEDICINE CLINIC | Facility: CLINIC | Age: 67
End: 2022-09-29
Payer: COMMERCIAL

## 2022-09-29 VITALS
SYSTOLIC BLOOD PRESSURE: 130 MMHG | BODY MASS INDEX: 28.14 KG/M2 | WEIGHT: 190 LBS | HEIGHT: 69 IN | TEMPERATURE: 98 F | HEART RATE: 72 BPM | RESPIRATION RATE: 14 BRPM | DIASTOLIC BLOOD PRESSURE: 72 MMHG

## 2022-09-29 DIAGNOSIS — H81.399 PERIPHERAL VERTIGO, UNSPECIFIED LATERALITY: ICD-10-CM

## 2022-09-29 DIAGNOSIS — Z96.641 HISTORY OF RIGHT HIP REPLACEMENT: ICD-10-CM

## 2022-09-29 DIAGNOSIS — E87.6 HYPOKALEMIA: ICD-10-CM

## 2022-09-29 DIAGNOSIS — D64.9 ANEMIA, UNSPECIFIED TYPE: Primary | ICD-10-CM

## 2022-09-29 DIAGNOSIS — M16.11 PRIMARY OSTEOARTHRITIS OF RIGHT HIP: Primary | ICD-10-CM

## 2022-09-29 DIAGNOSIS — M79.89 LEG SWELLING: ICD-10-CM

## 2022-09-29 PROCEDURE — 1160F RVW MEDS BY RX/DR IN RCRD: CPT | Performed by: STUDENT IN AN ORGANIZED HEALTH CARE EDUCATION/TRAINING PROGRAM

## 2022-09-29 PROCEDURE — 99214 OFFICE O/P EST MOD 30 MIN: CPT | Performed by: STUDENT IN AN ORGANIZED HEALTH CARE EDUCATION/TRAINING PROGRAM

## 2022-09-29 PROCEDURE — 97112 NEUROMUSCULAR REEDUCATION: CPT

## 2022-09-29 PROCEDURE — 97110 THERAPEUTIC EXERCISES: CPT

## 2022-09-29 RX ORDER — CLINDAMYCIN HYDROCHLORIDE 150 MG/1
CAPSULE ORAL
COMMUNITY
Start: 2022-09-28 | End: 2022-09-29

## 2022-09-29 NOTE — PROGRESS NOTES
Daily Note     Today's date: 2022  Patient name: Han Reynoso  : 1955  MRN: [id number]  Referring provider: Maynor Velasco, *  Dx:   Encounter Diagnosis     ICD-10-CM    1  Primary osteoarthritis of right hip  M16 11                   Subjective: I was released from my orthopedic Dr yesterday & I saw my PCP this morning  My hip is feeling good & my leg swelling continues to go down  Objective: See treatment diary below      Assessment: Tolerated treatment fair  Patient demonstrated fatigue post treatment, exhibited good technique with therapeutic exercises and would benefit from continued PT      Plan: Potential discharge next visit       Precautions: THR right 22      Manuals 22 9/6 9/8 9/12 9/15 9/22 9/29  2022    R hip PROM / stretching   Perf perf perf perf perf perf perf  perf    Prone R hip stretch  Perf perf x 4 minutes w/ quad stretching  perf perf perf perf Prone   prone                             Neuro Re-Ed             [place] balance walk outs  @#7 - 2 x 10 reps (lateral) @#7 - 2 x 10 reps (lateral) @#7 - 2 x 10 reps (lateral) @#7 - 2 x 10 reps (lateral) @#8 - 4 x 10 reps  @#9 - 4 x 10 reps  @#10 - 4 x 10 reps  @#11 - 4 x 10 reps                                                          Standing hip ext / abd  ---- --- R 2# x 20 reps  R 2# x 20 reps  R 2# x 20 reps ---- ---- ----     NuStep  10 min L3  Bike L3 Bike x 10 min L3 NuStep x 20 min L3 NuStep 10 min L3 NuStep 10 min L3 Bike x 10 min L3 (seat #6) Bike x 10 min L3 (seat #6)  10 min L3   Ther Ex             Ankle pumps HEP  -- --   ---  HEP    Quad sets HEP  --- --   ---  ----    Glut sets HEP  --- ---   ---  --    Heel slide --- ------ ----- ---  --- ---  ---    HR (standing) on  roll  20x 20x 20x 20x 20x 20x 20x 20x  20x   LSU/FSU  6" step x 10 reps  6" step x 10 reps  6" step x 10 reps each  6" step x 20 reps  6" step x 20 reps  8" step x 20 reps  --- 8" step x 20 reps  6" step x 10 reps Lateral steps @rail  ----  YTB 5 laps ----- ----- ----- --- ---- ---     Hip 3 way ---- YTB 2x10 ---- --- --- --- --- ----  YTB 20x ea   Lateral walking with resistance RTB x 4 laps  ----- --- ---- --- ---- ----  5 laps   SLR AROM 2 x 10 reps  AROM 2 x 10 reps  R/L 2x10 reps  2# AK 2 x 10 reps  2x10 reps   2# AK 2 x 10 reps  2# AK 2 x 10 reps 2# AK 2 x 10 reps  AROM 3 x 5 reps    Ther Activity             STS  20" mat x 20 reps w/ RTB abd  20" mat x 20 reps w/ RTB abd 1 blue foam on regular chair - 2 x 10 reps  w/RTB  abd Hi lo mat 2 x 10 reps  Hi lo mat 2 x 10 reps  Hi lo mat 2 x 10 reps  Hi lo mat 2 x 10 reps Hi lo mat 2 x 10 reps  Blue foam on chair x 20 reps   S/L  clamshells  2 x 10 reps  2 x 10 reps ------ Supine clamshells GTB x 20 reps  Supine clamshells GTB x 20 reps Supine clamshells blueTB x 20 reps S/L clamshells 2# AK 2x10 reps  S/L clamshells 2# AK 2x10 reps   Supine 30x   Bridging 3x10 reps  3x10 reps  2x10 reps  2x10 reps  2x10 reps 2x10 reps 2x10 reps  2x10 reps   2x10 reps    Gait Training                                        Modalities

## 2022-09-29 NOTE — PROGRESS NOTES
Clinic Visit Note  Sage Jacome 78 y o  male   MRN: [id number]    Assessment and Plan      Diagnoses and all orders for this visit:    Anemia, unspecified type  Plan for repeat labs in the next few weeks, yearly labs  While patient was in the hospital hemoglobin appear to drop, could be dilutional, patient has no signs/symptoms of anemia  No overt bleeding  History of right hip replacement  Recently see Orthopedics, doing well physical therapy, no residual pain    Leg swelling  Improving, continue activity with no restrictions, Lasix 20 mg as needed    Peripheral vertigo, unspecified laterality  Appears to BPPV given symptomatology, central vertigo ruled out with CT imaging, hold off on ENT at this time, monitor for recurrent symptoms    Other orders  -     Discontinue: clindamycin (CLEOCIN) 150 mg capsule; 4 capsules 1 hour prior to procedure (Patient not taking: Reported on 9/29/2022)      My impressions and treatment recommendations were discussed in detail with the patient who verbalized understanding and had no further questions  Discharge instructions were provided  Subjective     Chief Complaint:  Follow-up ER visit    History of Present Illness:    Patient is a pleasant 52-year-old gentleman accompanied by wife for follow-up on ER visit  Patient has symptoms of acute vertigo, unclear trigger requiring emergency room evaluation, cardiac workup including EKG unremarkable, CTA head/neck with and without contrast unremarkable  Bilateral leg swelling after hip surgery improving at this time, does have Lasix at home as needed  Continue regular walking/activity  No residual symptoms of dizziness, headache, shortness of breath, chest pain, nausea/vomiting/diarrhea  Patient feeling back to normal self      The following portions of the patient's history were reviewed and updated as appropriate: allergies, current medications, past family history, past medical history, past social history, past surgical history and problem list     REVIEW OF SYSTEMS:  A complete 12-point review of systems is negative other than that noted in the HPI  Review of Systems   Constitutional: Negative for chills, fatigue and fever  HENT: Negative for congestion and sore throat  Eyes: Negative for pain and visual disturbance  Respiratory: Negative for shortness of breath and wheezing  Cardiovascular: Negative for chest pain and palpitations  Gastrointestinal: Negative for abdominal pain, constipation, diarrhea, nausea and vomiting  Genitourinary: Negative for dysuria and frequency  Musculoskeletal: Negative for back pain and neck pain  Skin: Negative for color change and rash  Neurological: Negative for dizziness and headaches  Psychiatric/Behavioral: Negative for agitation and confusion  All other systems reviewed and are negative          Current Outpatient Medications:     furosemide (LASIX) 20 mg tablet, Take 1 tablet (20 mg total) by mouth daily for 21 days (Patient not taking: Reported on 9/29/2022), Disp: 21 tablet, Rfl: 0  Past Medical History:   Diagnosis Date    Arthritis      Past Surgical History:   Procedure Laterality Date    HIP SURGERY Right 07/18/2022    REPAIR QUADRICEPS / HAMSTRING MUSCLE Bilateral 2004     Social History     Socioeconomic History    Marital status: /Civil Union     Spouse name: Not on file    Number of children: Not on file    Years of education: Not on file    Highest education level: Not on file   Occupational History    Not on file   Tobacco Use    Smoking status: Never Smoker    Smokeless tobacco: Never Used   Vaping Use    Vaping Use: Never used   Substance and Sexual Activity    Alcohol use: Yes     Comment: occ  a beer every couple of weeks     Drug use: No    Sexual activity: Not on file   Other Topics Concern    Not on file   Social History Narrative    Not on file     Social Determinants of Health     Financial Resource Strain: Not on file   Food Insecurity: Not on file   Transportation Needs: Not on file   Physical Activity: Not on file   Stress: Not on file   Social Connections: Not on file   Intimate Partner Violence: Not on file   Housing Stability: Not on file     Family History   Problem Relation Age of Onset    Hypertension Mother     Heart failure Mother     Stroke Mother     Diabetes Mother     Hypertension Father     Heart failure Father     Heart attack Father     No Known Problems Sister     No Known Problems Brother     No Known Problems Maternal Aunt     No Known Problems Maternal Uncle     No Known Problems Paternal Aunt     No Known Problems Paternal Uncle     No Known Problems Maternal Grandmother     No Known Problems Maternal Grandfather     No Known Problems Paternal Grandmother     No Known Problems Paternal Grandfather     ADD / ADHD Neg Hx     Anesthesia problems Neg Hx     Cancer Neg Hx     Clotting disorder Neg Hx     Collagen disease Neg Hx     Dislocations Neg Hx     Learning disabilities Neg Hx     Neurological problems Neg Hx     Osteoporosis Neg Hx     Rheumatologic disease Neg Hx     Scoliosis Neg Hx     Vascular Disease Neg Hx      Allergies   Allergen Reactions    Aleve [Naproxen] Rash    Penicillins Rash     Reaction Date: [id number];        Objective     Vitals:    09/29/22 0913   BP: 130/72   BP Location: Left arm   Patient Position: Sitting   Cuff Size: Adult   Pulse: 72   Resp: 14   Temp: 98 °F (36 7 °C)   TempSrc: Temporal   Weight: 86 2 kg (190 lb)   Height: 5' 9" (1 753 m)       Physical Exam:     GENERAL: NAD, pleasant   HEENT:  NC/AT, PERRL, EOMI, no scleral icterus  CARDIAC:  RRR, +S1/S2, no S3/S4 appreciated, no m/g/r  PULMONARY:  CTA B/L, no wheezing/rales/rhonci, non-labored breathing  ABDOMEN:  Soft, NT/ND, no rebound/guarding/rigidity  Extremities: [place]   Minimal edema, cyanosis, or clubbing  Musculoskeletal:  Full range of motion intact in all extremities   NEUROLOGIC: Grossly intact, no focal deficits  SKIN:  No rashes or erythema noted on exposed skin  Psych: Normal affect, mood stable    ==  PLEASE NOTE:  This encounter was completed utilizing the [employer] Direct Speech Voice Recognition Software  Grammatical errors, random word insertions, pronoun errors and incomplete sentences are occasional consequences of the system due to software limitations, ambient noise and hardware issues  These may be missed by proof reading prior to affixing electronic signature  Any questions or concerns about the content, text or information contained within the body of this dictation should be directly addressed to the physician for clarification  Please do not hesitate to call me directly if you have any any questions or concerns      Murray Patton DO  [facility]   [facility]

## 2022-10-04 ENCOUNTER — OFFICE VISIT (OUTPATIENT)
Dept: PHYSICAL THERAPY | Facility: CLINIC | Age: 67
End: 2022-10-04
Payer: COMMERCIAL

## 2022-10-04 DIAGNOSIS — M16.11 PRIMARY OSTEOARTHRITIS OF RIGHT HIP: Primary | ICD-10-CM

## 2022-10-04 PROCEDURE — 97110 THERAPEUTIC EXERCISES: CPT

## 2022-10-04 PROCEDURE — 97112 NEUROMUSCULAR REEDUCATION: CPT

## 2022-10-04 NOTE — PROGRESS NOTES
Daily Note     Today's date: 10/4/2022  Patient name: Shan Avilez  : 1955  MRN: [id number]  Referring provider: Waldemar Vivas, *  Dx:   Encounter Diagnosis     ICD-10-CM    1  Primary osteoarthritis of right hip  M16 11                   Subjective: Patient states that his R hip feels great and has no pain  Objective: See treatment diary below      Assessment: Tolerated treatment well   Patient exhibited good technique with therapeutic exercises      Plan: D/C with HEP     Precautions: THR right 22      Manuals 8/29   09/01/22 9/6 9/8 9/12 9/15 9/22 10/4      R hip PROM / stretching   Perf perf perf perf perf perf perf  perf    Prone R hip stretch  Perf perf x 4 minutes w/ quad stretching  perf perf perf perf Prone   prone                             Neuro Re-Ed             [place] balance walk outs  @#7 - 2 x 10 reps (lateral) @#7 - 2 x 10 reps (lateral) @#7 - 2 x 10 reps (lateral) @#7 - 2 x 10 reps (lateral) @#8 - 4 x 10 reps  @#9 - 4 x 10 reps  @#10 - 4 x 10 reps  @#11 - 4 x 10 reps                                                          Standing hip ext / abd  ---- --- R 2# x 20 reps  R 2# x 20 reps  R 2# x 20 reps ---- ---- ----     NuStep  10 min L3  Bike L3 Bike x 10 min L3 NuStep x 20 min L3 NuStep 10 min L3 NuStep 10 min L3 Bike x 10 min L3 (seat #6) NuStep 15 min L3  10 min L3   Ther Ex             Ankle pumps HEP  -- --   ---      Quad sets HEP  --- --   ---  ----    Glut sets HEP  --- ---   ---  --    Heel slide --- ------ ----- ---  --- ---  ---    HR (standing) on  roll  20x 20x 20x 20x 20x 20x 20x 20x  20x   LSU/FSU  6" step x 10 reps  6" step x 10 reps  6" step x 10 reps each  6" step x 20 reps  6" step x 20 reps  8" step x 20 reps  --- 8" step x 20 reps  6" step x 10 reps    Lateral steps @rail  ----  YTB 5 laps ----- ----- ----- --- ---- ---     Hip 3 way ---- YTB 2x10 ---- --- --- --- --- ----  YTB 20x ea   Lateral walking with resistance RTB x 4 laps  ----- --- ---- --- ---- ----  5 laps   SLR AROM 2 x 10 reps  AROM 2 x 10 reps  R/L 2x10 reps  2# AK 2 x 10 reps  2x10 reps   2# AK 2 x 10 reps  2# AK 2 x 10 reps 2# AK 2 x 10 reps  AROM 3 x 5 reps    Ther Activity             STS  20" mat x 20 reps w/ RTB abd  20" mat x 20 reps w/ RTB abd 1 blue foam on regular chair - 2 x 10 reps  w/RTB  abd Hi lo mat 2 x 10 reps  Hi lo mat 2 x 10 reps  Hi lo mat 2 x 10 reps  Hi lo mat 2 x 10 reps Hi lo mat 2 x 10 reps  Blue foam on chair x 20 reps   S/L  clamshells  2 x 10 reps  2 x 10 reps ------ Supine clamshells GTB x 20 reps  Supine clamshells GTB x 20 reps Supine clamshells blueTB x 20 reps S/L clamshells 2# AK 2x10 reps  S/L clamshells 2# AK 2x10 reps   Supine 30x   Bridging 3x10 reps  3x10 reps  2x10 reps  2x10 reps  2x10 reps 2x10 reps 2x10 reps  2x10 reps   2x10 reps    Gait Training                                        Modalities

## 2022-10-06 ENCOUNTER — APPOINTMENT (OUTPATIENT)
Dept: PHYSICAL THERAPY | Facility: CLINIC | Age: 67
End: 2022-10-06

## 2022-10-11 PROBLEM — Z00.00 MEDICARE ANNUAL WELLNESS VISIT, SUBSEQUENT: Status: RESOLVED | Noted: 2022-08-01 | Resolved: 2022-10-11

## 2022-12-01 DIAGNOSIS — D64.9 ANEMIA, UNSPECIFIED TYPE: Primary | ICD-10-CM

## 2022-12-01 DIAGNOSIS — Z13.220 SCREENING CHOLESTEROL LEVEL: ICD-10-CM

## 2022-12-01 DIAGNOSIS — E55.9 VITAMIN D DEFICIENCY: ICD-10-CM

## 2022-12-01 DIAGNOSIS — R79.89 ELEVATED SERUM CREATININE: ICD-10-CM

## 2022-12-01 DIAGNOSIS — Z13.29 THYROID DISORDER SCREENING: ICD-10-CM

## 2022-12-01 DIAGNOSIS — Z12.5 PROSTATE CANCER SCREENING: ICD-10-CM

## 2022-12-07 ENCOUNTER — OFFICE VISIT (OUTPATIENT)
Dept: FAMILY MEDICINE CLINIC | Facility: CLINIC | Age: 67
End: 2022-12-07

## 2022-12-07 VITALS
HEIGHT: 69 IN | BODY MASS INDEX: 27.25 KG/M2 | TEMPERATURE: 97.7 F | RESPIRATION RATE: 14 BRPM | HEART RATE: 78 BPM | DIASTOLIC BLOOD PRESSURE: 72 MMHG | WEIGHT: 184 LBS | SYSTOLIC BLOOD PRESSURE: 112 MMHG

## 2022-12-07 DIAGNOSIS — Z96.641 HISTORY OF RIGHT HIP REPLACEMENT: ICD-10-CM

## 2022-12-07 DIAGNOSIS — Z86.711 HISTORY OF PULMONARY EMBOLUS (PE): ICD-10-CM

## 2022-12-07 DIAGNOSIS — I87.2 VENOUS STASIS DERMATITIS OF BOTH LOWER EXTREMITIES: ICD-10-CM

## 2022-12-07 DIAGNOSIS — L03.119 CELLULITIS OF LOWER EXTREMITY, UNSPECIFIED LATERALITY: Primary | ICD-10-CM

## 2022-12-07 DIAGNOSIS — N40.0 BENIGN PROSTATIC HYPERPLASIA WITHOUT URINARY OBSTRUCTION: ICD-10-CM

## 2022-12-07 PROBLEM — H81.399 PERIPHERAL VERTIGO: Status: RESOLVED | Noted: 2022-09-29 | Resolved: 2022-12-07

## 2022-12-07 PROBLEM — K59.03 DRUG-INDUCED CONSTIPATION: Status: RESOLVED | Noted: 2022-08-01 | Resolved: 2022-12-07

## 2022-12-07 PROBLEM — M79.89 LEG SWELLING: Status: RESOLVED | Noted: 2022-08-01 | Resolved: 2022-12-07

## 2022-12-07 RX ORDER — CEPHALEXIN 500 MG/1
500 CAPSULE ORAL EVERY 6 HOURS SCHEDULED
Qty: 28 CAPSULE | Refills: 0 | Status: SHIPPED | OUTPATIENT
Start: 2022-12-07 | End: 2022-12-14

## 2022-12-07 RX ORDER — TRIAMCINOLONE ACETONIDE 1 MG/G
CREAM TOPICAL 2 TIMES DAILY
Qty: 30 G | Refills: 1 | Status: SHIPPED | OUTPATIENT
Start: 2022-12-07

## 2022-12-07 NOTE — PROGRESS NOTES
Clinic Visit Note  Justin Marcus 78 y o  male   MRN: [id number]    Assessment and Plan      Diagnoses and all orders for this visit:    Cellulitis of lower extremity, unspecified laterality  Cannot rule out early signs of cellulitis versus venous stasis dermatitis postsurgery  Recommend antibiotic therapy  -     cephalexin (KEFLEX) 500 mg capsule; Take 1 capsule (500 mg total) by mouth every 6 (six) hours for 7 days    History of right hip replacement  Patient is recovering well, no ambulatory dysfunction  Continue to monitor lower extremity bilateral swelling resolution  History of pulmonary embolus (PE)  DVT lower extremity ultrasound 9/1/2022    Benign prostatic hyperplasia without urinary obstruction  Stable, continue to monitor    Venous stasis dermatitis of both lower extremities  Signs of stasis dermatitis, recommend triamcinolone cream, okay to use compression stocking bilaterally  -     triamcinolone (KENALOG) 0 1 % cream; Apply topically 2 (two) times a day      My impressions and treatment recommendations were discussed in detail with the patient who verbalized understanding and had no further questions  Discharge instructions were provided  Subjective     Chief Complaint: Follow-up/same-day visit    History of Present Illness:    Patient is a pleasant 29-year-old male coming in as a same-day/follow-up visit on mild bilateral swelling, positive rash that has been going on for few days now  The following portions of the patient's history were reviewed and updated as appropriate: allergies, current medications, past family history, past medical history, past social history, past surgical history and problem list     REVIEW OF SYSTEMS:  A complete 12-point review of systems is negative other than that noted in the HPI  Review of Systems   Constitutional: Negative for chills, fatigue and fever  HENT: Negative for congestion, sinus pressure and sore throat      Respiratory: Negative for cough, shortness of breath and wheezing  Cardiovascular: Positive for leg swelling  Negative for chest pain and palpitations  Genitourinary: Negative for difficulty urinating and hematuria  Skin: Positive for rash  Negative for wound  Neurological: Negative for dizziness and headaches           Current Outpatient Medications:   •  cephalexin (KEFLEX) 500 mg capsule, Take 1 capsule (500 mg total) by mouth every 6 (six) hours for 7 days, Disp: 28 capsule, Rfl: 0  •  triamcinolone (KENALOG) 0 1 % cream, Apply topically 2 (two) times a day, Disp: 30 g, Rfl: 1  Past Medical History:   Diagnosis Date   • Arthritis      Past Surgical History:   Procedure Laterality Date   • HIP SURGERY Right 07/18/2022   • REPAIR QUADRICEPS / HAMSTRING MUSCLE Bilateral 2004     Social History     Socioeconomic History   • Marital status: /Civil Union     Spouse name: Not on file   • Number of children: Not on file   • Years of education: Not on file   • Highest education level: Not on file   Occupational History   • Not on file   Tobacco Use   • Smoking status: Never   • Smokeless tobacco: Never   Vaping Use   • Vaping Use: Never used   Substance and Sexual Activity   • Alcohol use: Yes     Comment: occ  a beer every couple of weeks    • Drug use: No   • Sexual activity: Not on file   Other Topics Concern   • Not on file   Social History Narrative   • Not on file     Social Determinants of Health     Financial Resource Strain: Not on file   Food Insecurity: Not on file   Transportation Needs: Not on file   Physical Activity: Not on file   Stress: Not on file   Social Connections: Not on file   Intimate Partner Violence: Not on file   Housing Stability: Not on file     Family History   Problem Relation Age of Onset   • Hypertension Mother    • Heart failure Mother    • Stroke Mother    • Diabetes Mother    • Hypertension Father    • Heart failure Father    • Heart attack Father    • No Known Problems Sister    • No Known Problems Brother    • No Known Problems Maternal Aunt    • No Known Problems Maternal Uncle    • No Known Problems Paternal Aunt    • No Known Problems Paternal Uncle    • No Known Problems Maternal Grandmother    • No Known Problems Maternal Grandfather    • No Known Problems Paternal Grandmother    • No Known Problems Paternal Grandfather    • ADD / ADHD Neg Hx    • Anesthesia problems Neg Hx    • Cancer Neg Hx    • Clotting disorder Neg Hx    • Collagen disease Neg Hx    • Dislocations Neg Hx    • Learning disabilities Neg Hx    • Neurological problems Neg Hx    • Osteoporosis Neg Hx    • Rheumatologic disease Neg Hx    • Scoliosis Neg Hx    • Vascular Disease Neg Hx      Allergies   Allergen Reactions   • Aleve [Naproxen] Rash   • Penicillins Rash     Reaction Date: [id number];        Objective     Vitals:    12/07/22 0929   BP: 112/72   BP Location: Left arm   Patient Position: Sitting   Cuff Size: Adult   Pulse: 78   Resp: 14   Temp: 97 7 °F (36 5 °C)   TempSrc: Temporal   Weight: 83 5 kg (184 lb)   Height: 5' 9" (1 753 m)       Physical Exam:     GENERAL: NAD, pleasant   HEENT:  NC/AT, PERRL, EOMI, no scleral icterus  CARDIAC:  RRR, +S1/S2, no S3/S4 appreciated, no m/g/r  PULMONARY:  CTA B/L, no wheezing/rales/rhonci, non-labored breathing  ABDOMEN:  Soft, NT/ND, no rebound/guarding/rigidity  Extremities: [place] Minimal edema bilaterally, no cyanosis, or clubbing  Musculoskeletal:  Full range of motion intact in all extremities   NEUROLOGIC: Grossly intact, no focal deficits  SKIN: Stasis dermatitis bilateral lower extremities with blanching erythema concerning for early stages of cellulitis  Psych: Normal affect, mood stable    ==  PLEASE NOTE:  This encounter was completed utilizing the [employer] Direct Speech Voice Recognition Software   Grammatical errors, random word insertions, pronoun errors and incomplete sentences are occasional consequences of the system due to software limitations, ambient noise and hardware issues  These may be missed by proof reading prior to affixing electronic signature  Any questions or concerns about the content, text or information contained within the body of this dictation should be directly addressed to the physician for clarification  Please do not hesitate to call me directly if you have any any questions or concerns      Brad Done, DO [facility]   [facility]

## 2023-02-02 DIAGNOSIS — I87.2 VENOUS STASIS DERMATITIS OF BOTH LOWER EXTREMITIES: ICD-10-CM

## 2023-02-02 RX ORDER — TRIAMCINOLONE ACETONIDE 1 MG/G
CREAM TOPICAL 2 TIMES DAILY
Qty: 30 G | Refills: 1 | Status: SHIPPED | OUTPATIENT
Start: 2023-02-02 | End: 2023-05-16 | Stop reason: ALTCHOICE

## 2023-05-11 LAB
25(OH)D3+25(OH)D2 SERPL-MCNC: 42.7 NG/ML (ref 30–100)
ALBUMIN SERPL-MCNC: 4.3 G/DL (ref 3.8–4.8)
ALBUMIN/GLOB SERPL: 2 {RATIO} (ref 1.2–2.2)
ALP SERPL-CCNC: 117 IU/L (ref 44–121)
ALT SERPL-CCNC: 30 IU/L (ref 0–44)
AST SERPL-CCNC: 32 IU/L (ref 0–40)
BASOPHILS # BLD AUTO: 0 X10E3/UL (ref 0–0.2)
BASOPHILS NFR BLD AUTO: 1 %
BILIRUB SERPL-MCNC: 0.5 MG/DL (ref 0–1.2)
BUN SERPL-MCNC: 17 MG/DL (ref 8–27)
BUN/CREAT SERPL: 19 (ref 10–24)
CALCIUM SERPL-MCNC: 9 MG/DL (ref 8.6–10.2)
CHLORIDE SERPL-SCNC: 101 MMOL/L (ref 96–106)
CHOLEST SERPL-MCNC: 146 MG/DL (ref 100–199)
CO2 SERPL-SCNC: 23 MMOL/L (ref 20–29)
CREAT SERPL-MCNC: 0.91 MG/DL (ref 0.76–1.27)
EGFR: 93 ML/MIN/1.73
EOSINOPHIL # BLD AUTO: 0.2 X10E3/UL (ref 0–0.4)
EOSINOPHIL NFR BLD AUTO: 4 %
ERYTHROCYTE [DISTWIDTH] IN BLOOD BY AUTOMATED COUNT: 13.1 % (ref 11.6–15.4)
GLOBULIN SER-MCNC: 2.1 G/DL (ref 1.5–4.5)
GLUCOSE SERPL-MCNC: 98 MG/DL (ref 70–99)
HCT VFR BLD AUTO: 40.1 % (ref 37.5–51)
HDLC SERPL-MCNC: 60 MG/DL
HGB BLD-MCNC: 13.7 G/DL (ref 13–17.7)
IMM GRANULOCYTES # BLD: 0 X10E3/UL (ref 0–0.1)
IMM GRANULOCYTES NFR BLD: 0 %
LDLC SERPL CALC-MCNC: 71 MG/DL (ref 0–99)
LYMPHOCYTES # BLD AUTO: 1.5 X10E3/UL (ref 0.7–3.1)
LYMPHOCYTES NFR BLD AUTO: 32 %
MCH RBC QN AUTO: 32.2 PG (ref 26.6–33)
MCHC RBC AUTO-ENTMCNC: 34.2 G/DL (ref 31.5–35.7)
MCV RBC AUTO: 94 FL (ref 79–97)
MICRODELETION SYND BLD/T FISH: NORMAL
MONOCYTES # BLD AUTO: 0.6 X10E3/UL (ref 0.1–0.9)
MONOCYTES NFR BLD AUTO: 12 %
NEUTROPHILS # BLD AUTO: 2.5 X10E3/UL (ref 1.4–7)
NEUTROPHILS NFR BLD AUTO: 51 %
PLATELET # BLD AUTO: 180 X10E3/UL (ref 150–450)
POTASSIUM SERPL-SCNC: 4.7 MMOL/L (ref 3.5–5.2)
PROT SERPL-MCNC: 6.4 G/DL (ref 6–8.5)
PSA SERPL-MCNC: 4.8 NG/ML (ref 0–4)
RBC # BLD AUTO: 4.26 X10E6/UL (ref 4.14–5.8)
SL AMB VLDL CHOLESTEROL CALC: 15 MG/DL (ref 5–40)
SODIUM SERPL-SCNC: 137 MMOL/L (ref 134–144)
TRIGL SERPL-MCNC: 80 MG/DL (ref 0–149)
TSH SERPL DL<=0.005 MIU/L-ACNC: 3.4 UIU/ML (ref 0.45–4.5)
WBC # BLD AUTO: 4.8 X10E3/UL (ref 3.4–10.8)

## 2023-05-16 ENCOUNTER — OFFICE VISIT (OUTPATIENT)
Dept: FAMILY MEDICINE CLINIC | Facility: CLINIC | Age: 68
End: 2023-05-16

## 2023-05-16 VITALS
RESPIRATION RATE: 14 BRPM | BODY MASS INDEX: 28.29 KG/M2 | DIASTOLIC BLOOD PRESSURE: 70 MMHG | WEIGHT: 191 LBS | TEMPERATURE: 98.8 F | SYSTOLIC BLOOD PRESSURE: 120 MMHG | HEART RATE: 68 BPM | OXYGEN SATURATION: 98 % | HEIGHT: 69 IN

## 2023-05-16 DIAGNOSIS — I87.2 VENOUS STASIS DERMATITIS OF BOTH LOWER EXTREMITIES: ICD-10-CM

## 2023-05-16 DIAGNOSIS — R97.20 ELEVATED PSA MEASUREMENT: Primary | ICD-10-CM

## 2023-05-16 DIAGNOSIS — M79.89 SWELLING OF LOWER EXTREMITY: ICD-10-CM

## 2023-05-16 DIAGNOSIS — N40.0 BENIGN PROSTATIC HYPERPLASIA WITHOUT URINARY OBSTRUCTION: ICD-10-CM

## 2023-05-16 RX ORDER — FUROSEMIDE 20 MG/1
20 TABLET ORAL DAILY PRN
Qty: 30 TABLET | Refills: 0 | Status: SHIPPED | OUTPATIENT
Start: 2023-05-16

## 2023-05-16 RX ORDER — TRIAMCINOLONE ACETONIDE 1 MG/G
CREAM TOPICAL 2 TIMES DAILY
Qty: 30 G | Refills: 1 | Status: SHIPPED | OUTPATIENT
Start: 2023-05-16

## 2023-05-16 NOTE — PROGRESS NOTES
Clinic Visit Note  Miguel Andre 78 y o  male   MRN: [id number]    Assessment and Plan      Diagnoses and all orders for this visit:    Elevated PSA measurement  -     Ambulatory Referral to Urology; Future    Benign prostatic hyperplasia without urinary obstruction  -     Ambulatory Referral to Urology; Future    Swelling of lower extremity  Lower extremity bilateral swelling, 1+ with some venous stasis dermatitis  Recommending echocardiogram, EKG appropriate previously  Recommend using Lasix 20 mg as needed for bilateral swelling of legs  Ongoing work-up  Review of VSA lower extremity bilateral, appears there was enlarged lymph nodes, if echocardiogram unremarkable we will obtain CT abdomen/pelvis  -     Echo complete w/ contrast if indicated; Future  -     furosemide (LASIX) 20 mg tablet; Take 1 tablet (20 mg total) by mouth daily as needed (bilateral swelling of legs)    Venous stasis dermatitis of both lower extremities  -     triamcinolone (KENALOG) 0 1 % cream; Apply topically 2 (two) times a day      My impressions and treatment recommendations were discussed in detail with the patient who verbalized understanding and had no further questions  Discharge instructions were provided  Subjective     Chief Complaint: Follow-up visit    History of Present Illness:    Patient is a pleasant 50-year-old male coming in for follow-up visit, labs reviewed, prostate level elevated, recommending urology evaluation  Patient also notes that he is still struggling intermittently with bilateral lower extremity swelling with some venous stasis dermatitis  The following portions of the patient's history were reviewed and updated as appropriate: allergies, current medications, past family history, past medical history, past social history, past surgical history and problem list     REVIEW OF SYSTEMS:  A complete 12-point review of systems is negative other than that noted in the HPI      Review of Systems Constitutional: Negative for chills, fatigue and fever  HENT: Negative for congestion and sore throat  Eyes: Negative for pain and visual disturbance  Respiratory: Negative for shortness of breath and wheezing  Cardiovascular: Positive for leg swelling  Negative for chest pain and palpitations  Gastrointestinal: Negative for abdominal pain, constipation, diarrhea, nausea and vomiting  Genitourinary: Negative for dysuria and frequency  Musculoskeletal: Negative for back pain and neck pain  Skin: Negative for color change and rash  Neurological: Negative for dizziness and headaches  Psychiatric/Behavioral: Negative for agitation and confusion  All other systems reviewed and are negative          Current Outpatient Medications:   •  furosemide (LASIX) 20 mg tablet, Take 1 tablet (20 mg total) by mouth daily as needed (bilateral swelling of legs), Disp: 30 tablet, Rfl: 0  •  triamcinolone (KENALOG) 0 1 % cream, Apply topically 2 (two) times a day, Disp: 30 g, Rfl: 1  Past Medical History:   Diagnosis Date   • Arthritis      Past Surgical History:   Procedure Laterality Date   • HIP SURGERY Right 07/18/2022   • REPAIR QUADRICEPS / HAMSTRING MUSCLE Bilateral 2004     Social History     Socioeconomic History   • Marital status: /Civil Union     Spouse name: Not on file   • Number of children: Not on file   • Years of education: Not on file   • Highest education level: Not on file   Occupational History   • Not on file   Tobacco Use   • Smoking status: Never   • Smokeless tobacco: Never   Vaping Use   • Vaping Use: Never used   Substance and Sexual Activity   • Alcohol use: Yes     Comment: occ  a beer every couple of weeks    • Drug use: No   • Sexual activity: Not on file   Other Topics Concern   • Not on file   Social History Narrative   • Not on file     Social Determinants of Health     Financial Resource Strain: Not on file   Food Insecurity: Not on file   Transportation Needs: Not on "file   Physical Activity: Not on file   Stress: Not on file   Social Connections: Not on file   Intimate Partner Violence: Not on file   Housing Stability: Not on file     Family History   Problem Relation Age of Onset   • Hypertension Mother    • Heart failure Mother    • Stroke Mother    • Diabetes Mother    • Hypertension Father    • Heart failure Father    • Heart attack Father    • No Known Problems Sister    • No Known Problems Brother    • No Known Problems Maternal Aunt    • No Known Problems Maternal Uncle    • No Known Problems Paternal Aunt    • No Known Problems Paternal Uncle    • No Known Problems Maternal Grandmother    • No Known Problems Maternal Grandfather    • No Known Problems Paternal Grandmother    • No Known Problems Paternal Grandfather    • ADD / ADHD Neg Hx    • Anesthesia problems Neg Hx    • Cancer Neg Hx    • Clotting disorder Neg Hx    • Collagen disease Neg Hx    • Dislocations Neg Hx    • Learning disabilities Neg Hx    • Neurological problems Neg Hx    • Osteoporosis Neg Hx    • Rheumatologic disease Neg Hx    • Scoliosis Neg Hx    • Vascular Disease Neg Hx      Allergies   Allergen Reactions   • Aleve [Naproxen] Rash   • Penicillins Rash     Reaction Date: [id number];        Objective     Vitals:    05/16/23 1040   BP: 120/70   BP Location: Left arm   Patient Position: Sitting   Cuff Size: Adult   Pulse: 68   Resp: 14   Temp: 98 8 °F (37 1 °C)   TempSrc: Temporal   SpO2: 98%   Weight: 86 6 kg (191 lb)   Height: 5' 9\" (1 753 m)       Physical Exam:     GENERAL: NAD, pleasant   HEENT:  NC/AT, PERRL, EOMI, no scleral icterus  CARDIAC:  RRR, +S1/S2, no S3/S4 appreciated, no m/g/r  PULMONARY:  CTA B/L, no wheezing/rales/rhonci, non-labored breathing  ABDOMEN:  Soft, NT/ND, no rebound/guarding/rigidity  Extremities:  1 plus edema b/l, cyanosis, or clubbing  Musculoskeletal:  Full range of motion intact in all extremities   NEUROLOGIC: Grossly intact, no focal deficits  SKIN:  No rashes or " erythema noted on exposed skin  Psych: Normal affect, mood stable    ==  PLEASE NOTE:  This encounter was completed utilizing the [employer] Direct Speech Voice Recognition Software  Grammatical errors, random word insertions, pronoun errors and incomplete sentences are occasional consequences of the system due to software limitations, ambient noise and hardware issues  These may be missed by proof reading prior to affixing electronic signature  Any questions or concerns about the content, text or information contained within the body of this dictation should be directly addressed to the physician for clarification  Please do not hesitate to call me directly if you have any any questions or concerns      DO Princess Easton [address] [facility]   [facility]

## 2023-05-22 ENCOUNTER — OFFICE VISIT (OUTPATIENT)
Dept: UROLOGY | Facility: CLINIC | Age: 68
End: 2023-05-22

## 2023-05-22 VITALS
WEIGHT: 191 LBS | SYSTOLIC BLOOD PRESSURE: 122 MMHG | HEIGHT: 69 IN | OXYGEN SATURATION: 99 % | HEART RATE: 73 BPM | BODY MASS INDEX: 28.29 KG/M2 | DIASTOLIC BLOOD PRESSURE: 70 MMHG

## 2023-05-22 DIAGNOSIS — N40.0 BENIGN PROSTATIC HYPERPLASIA WITHOUT URINARY OBSTRUCTION: ICD-10-CM

## 2023-05-22 DIAGNOSIS — R97.20 ELEVATED PSA MEASUREMENT: ICD-10-CM

## 2023-05-22 LAB
SL AMB  POCT GLUCOSE, UA: NORMAL
SL AMB LEUKOCYTE ESTERASE,UA: NORMAL
SL AMB POCT BILIRUBIN,UA: NORMAL
SL AMB POCT BLOOD,UA: NORMAL
SL AMB POCT CLARITY,UA: CLEAR
SL AMB POCT COLOR,UA: YELLOW
SL AMB POCT KETONES,UA: NORMAL
SL AMB POCT NITRITE,UA: NORMAL
SL AMB POCT PH,UA: 5
SL AMB POCT SPECIFIC GRAVITY,UA: 1.02
SL AMB POCT URINE PROTEIN: NORMAL
SL AMB POCT UROBILINOGEN: 0.2

## 2023-05-22 RX ORDER — DIPHENOXYLATE HYDROCHLORIDE AND ATROPINE SULFATE 2.5; .025 MG/1; MG/1
1 TABLET ORAL DAILY
COMMUNITY

## 2023-05-22 NOTE — PROGRESS NOTES
Office Visit- Urology  Flip Nath 1955 MRN: [id number]      Assessment/Discussion/Plan    78 y o  male managed by     1  Elevated PSA  -Last PSA was measured at 4 8 on 05/10/2023 up from 4 1 in 2021  -KAITY demonstrated asymmetric firmness/induration on the right lobe of the prostate which is concerning for prostate cancer  It was my recommendation that patient proceeds with transrectal biopsy for most expedient rule in/rule out of prostate cancer  but patient wants to hold off for 2 months (until July) due to concern for postoperative recovery from orthopedic surgery 10 months ago  Patient understands that his PSA/abdnormal KAITY may represent clinically significant prostate cancer - patient is agreeable to obtain a multiparametric MRI of the prostate   Patient will call office if he changes mind on obtainment of transrectal biopsy  -Proceed with multiparametric MRI of the prostate  -Follow-up after obtainment of MRI      Chief Complaint:   Levy Newman is a 78 y o  male presenting to the office with his spouse for an initial evaluation regarding  Elevated PSA        Subjective    -Patient is a 30-year-old male with past surgical history of right total hip replacement  -No past urologic history  -Presents today for evaluation of elevated PSA  Measured 4 8 in May 2023  Was previously 4 1 in June 2021  Patient states that PSA previously in 2017 was high as 4 2 but on recheck was 3 6  -He has had no MRI or biopsy of the prostate for further evaluation  -No known family history of prostate cancer  -No known family history of ovarian, breast, pancreatic cancer  -He denies bothersome urinary symptoms including sensation of incomplete bladder emptying, weak urinary stream, frequency, urgency, gross hematuria, dysuria  -He was told in the past that he had an enlarged prostate    No history of prostatitis  -Last prostate exam was in 2017 without noted abnormality besides being enlarged    -He does not take any medications for urinary symptoms including finasteride  -No sexual activity/cycling/instrumentation prior to obtainment of most recent PSA        Zachariah 43 Most Recent Value   AUA SYMPTOM SCORE    How often have you had a sensation of not emptying your bladder completely after you finished urinating? 0 (P)     How often have you had to urinate again less than two hours after you finished urinating? 0 (P)     How often have you found you stopped and started again several times when you urinate? 0 (P)     How often have you found it difficult to postpone urination? 0 (P)     How often have you had a weak urinary stream? 0 (P)     How often have you had to push or strain to begin urination? 0 (P)     How many times did you most typically get up to urinate from the time you went to bed at night until the time you got up in the morning? 1 (P)     Quality of Life: If you were to spend the rest of your life with your urinary condition just the way it is now, how would you feel about that? 2 (P)     AUA SYMPTOM SCORE 1 (P)           ROS:   Review of Systems   Constitutional: Negative  Negative for chills, fatigue and fever  HENT: Negative  Eyes: Negative  Respiratory: Negative  Negative for shortness of breath  Cardiovascular: Negative  Negative for chest pain  Gastrointestinal: Negative  Endocrine: Negative  Allergic/Immunologic: Negative  Neurological: Negative  Psychiatric/Behavioral: Negative            Past Medical History  Past Medical History:   Diagnosis Date   • Arthritis        Past Surgical History  Past Surgical History:   Procedure Laterality Date   • HIP SURGERY Right 07/18/2022   • REPAIR QUADRICEPS / HAMSTRING MUSCLE Bilateral 2004       Past Family History  Family History   Problem Relation Age of Onset   • Hypertension Mother    • Heart failure Mother    • Stroke Mother    • Diabetes Mother    • Hypertension Father    • Heart failure Father    • Heart attack Father • No Known Problems Sister    • No Known Problems Brother    • No Known Problems Maternal Aunt    • No Known Problems Maternal Uncle    • No Known Problems Paternal Aunt    • No Known Problems Paternal Uncle    • No Known Problems Maternal Grandmother    • No Known Problems Maternal Grandfather    • No Known Problems Paternal Grandmother    • No Known Problems Paternal Grandfather    • ADD / ADHD Neg Hx    • Anesthesia problems Neg Hx    • Cancer Neg Hx    • Clotting disorder Neg Hx    • Collagen disease Neg Hx    • Dislocations Neg Hx    • Learning disabilities Neg Hx    • Neurological problems Neg Hx    • Osteoporosis Neg Hx    • Rheumatologic disease Neg Hx    • Scoliosis Neg Hx    • Vascular Disease Neg Hx        Past Social history  Social History     Socioeconomic History   • Marital status: /Civil Union     Spouse name: Not on file   • Number of children: Not on file   • Years of education: Not on file   • Highest education level: Not on file   Occupational History   • Not on file   Tobacco Use   • Smoking status: Never   • Smokeless tobacco: Never   Vaping Use   • Vaping Use: Never used   Substance and Sexual Activity   • Alcohol use: Yes     Comment: occ  a beer every couple of weeks    • Drug use: No   • Sexual activity: Not on file   Other Topics Concern   • Not on file   Social History Narrative   • Not on file     Social Determinants of Health     Financial Resource Strain: Not on file   Food Insecurity: Not on file   Transportation Needs: Not on file   Physical Activity: Not on file   Stress: Not on file   Social Connections: Not on file   Intimate Partner Violence: Not on file   Housing Stability: Not on file       Current Medications  Current Outpatient Medications   Medication Sig Dispense Refill   • furosemide (LASIX) 20 mg tablet Take 1 tablet (20 mg total) by mouth daily as needed (bilateral swelling of legs) 30 tablet 0   • multivitamin (THERAGRAN) TABS Take 1 tablet by mouth daily "  • triamcinolone (KENALOG) 0 1 % cream Apply topically 2 (two) times a day 30 g 1     No current facility-administered medications for this visit  Allergies  Allergies   Allergen Reactions   • Aleve [Naproxen] Rash   • Penicillins Rash     Reaction Date: [id number];        OBJECTIVE    Vitals   Vitals:    05/22/23 1017   BP: 122/70   BP Location: Left arm   Patient Position: Sitting   Pulse: 73   SpO2: 99%   Weight: 86 6 kg (191 lb)   Height: 5' 9\" (1 753 m)       PVR:    Physical Exam  Vitals reviewed  Constitutional:       General: He is not in acute distress  Appearance: Normal appearance  He is normal weight  He is not ill-appearing or toxic-appearing  HENT:      Head: Normocephalic and atraumatic  Eyes:      Conjunctiva/sclera: Conjunctivae normal    Cardiovascular:      Rate and Rhythm: Normal rate  Pulmonary:      Effort: Pulmonary effort is normal  No respiratory distress  Abdominal:      Tenderness: There is no right CVA tenderness or left CVA tenderness  Genitourinary:     Comments: Globally enlarged prostate  Diffusely firm but more  significant firmness/induration on the right side as opposed to the left side  Musculoskeletal:         General: Normal range of motion  Cervical back: Normal range of motion and neck supple  Skin:     General: Skin is warm and dry  Neurological:      General: No focal deficit present  Mental Status: He is alert  Cranial Nerves: No cranial nerve deficit  Psychiatric:         Mood and Affect: Mood normal          Behavior: Behavior normal          Thought Content:  Thought content normal           Labs:     Lab Results   Component Value Date    PSA 4 8 (H) 05/10/2023    PSA 4 1 (H) 06/16/2021     Lab Results   Component Value Date    CREATININE 0 91 05/10/2023      No results found for: HGBA1C  Lab Results   Component Value Date    CALCIUM 8 2 (L) 09/24/2022    K 4 7 05/10/2023    CO2 23 05/10/2023     05/10/2023    BUN 17 " 05/10/2023    CREATININE 0 91 05/10/2023       I have personally reviewed all pertinent lab results and reviewed with patient    Imaging       Daysi Dukes PA-C  Date: 5/22/2023 Time: 10:23 AM  [facility]    This note was written using fluency dictation software  Please excuse any resulting minor grammatical errors

## 2023-05-31 ENCOUNTER — HOSPITAL ENCOUNTER (OUTPATIENT)
Dept: NON INVASIVE DIAGNOSTICS | Facility: HOSPITAL | Age: 68
Discharge: HOME | End: 2023-05-31
Attending: STUDENT IN AN ORGANIZED HEALTH CARE EDUCATION/TRAINING PROGRAM

## 2023-05-31 VITALS
WEIGHT: 191 LBS | SYSTOLIC BLOOD PRESSURE: 112 MMHG | HEART RATE: 58 BPM | DIASTOLIC BLOOD PRESSURE: 72 MMHG | HEIGHT: 69 IN | BODY MASS INDEX: 28.29 KG/M2

## 2023-05-31 DIAGNOSIS — M79.89 SWELLING OF LOWER EXTREMITY: ICD-10-CM

## 2023-05-31 LAB
AORTIC ROOT: 3 CM
APICAL FOUR CHAMBER EJECTION FRACTION: 70 %
AV LVOT PEAK GRADIENT: 6 MMHG
AV PEAK GRADIENT: 13 MMHG
E WAVE DECELERATION TIME: 166 MS
FRACTIONAL SHORTENING: 37 (ref 28–44)
INTERVENTRICULAR SEPTUM IN DIASTOLE (PARASTERNAL SHORT AXIS VIEW): 0.9 CM
INTERVENTRICULAR SEPTUM: 0.9 CM (ref 0.6–1.1)
LAAS-AP2: 24.5 CM2
LAAS-AP4: 22.6 CM2
LEFT ATRIUM SIZE: 3.8 CM
LEFT INTERNAL DIMENSION IN SYSTOLE: 2.9 CM (ref 2.1–4)
LEFT VENTRICULAR INTERNAL DIMENSION IN DIASTOLE: 4.6 CM (ref 3.5–6)
LEFT VENTRICULAR POSTERIOR WALL IN END DIASTOLE: 1 CM
LEFT VENTRICULAR STROKE VOLUME: 63 ML
LVSV (TEICH): 63 ML
MV E'TISSUE VEL-SEP: 11 CM/S
MV PEAK A VEL: 0.75 M/S
MV PEAK E VEL: 84 CM/S
MV STENOSIS PRESSURE HALF TIME: 48 MS
MV VALVE AREA P 1/2 METHOD: 4.58
RIGHT ATRIUM AREA SYSTOLE A4C: 14.4 CM2
RIGHT VENTRICLE ID DIMENSION: 3.9 CM
SL CV LEFT ATRIUM LENGTH A2C: 5.8 CM
SL CV PED ECHO LEFT VENTRICLE DIASTOLIC VOLUME (MOD BIPLANE) 2D: 95 ML
SL CV PED ECHO LEFT VENTRICLE SYSTOLIC VOLUME (MOD BIPLANE) 2D: 32 ML
TR MAX PG: 31 MMHG
TR PEAK VELOCITY: 2.8 M/S
TRICUSPID ANNULAR PLANE SYSTOLIC EXCURSION: 1.7 CM
TRICUSPID VALVE PEAK REGURGITATION VELOCITY: 2.78 M/S

## 2023-06-01 ENCOUNTER — HOSPITAL ENCOUNTER (OUTPATIENT)
Dept: RADIOLOGY | Age: 68
Discharge: HOME | End: 2023-06-01

## 2023-06-01 DIAGNOSIS — R97.20 ELEVATED PSA MEASUREMENT: ICD-10-CM

## 2023-06-01 RX ADMIN — GADOBUTROL 9 ML: 604.72 INJECTION INTRAVENOUS at 11:05

## 2023-06-06 DIAGNOSIS — M79.89 SWELLING OF LOWER EXTREMITY: ICD-10-CM

## 2023-06-06 RX ORDER — FUROSEMIDE 20 MG/1
TABLET ORAL
Qty: 30 TABLET | Refills: 0 | Status: SHIPPED | OUTPATIENT
Start: 2023-06-06

## 2023-06-14 ENCOUNTER — OFFICE VISIT (OUTPATIENT)
Dept: UROLOGY | Facility: CLINIC | Age: 68
End: 2023-06-14
Payer: COMMERCIAL

## 2023-06-14 VITALS
SYSTOLIC BLOOD PRESSURE: 130 MMHG | HEIGHT: 69 IN | WEIGHT: 194.8 LBS | DIASTOLIC BLOOD PRESSURE: 78 MMHG | OXYGEN SATURATION: 96 % | BODY MASS INDEX: 28.85 KG/M2 | HEART RATE: 99 BPM | RESPIRATION RATE: 16 BRPM

## 2023-06-14 DIAGNOSIS — R97.20 ELEVATED PSA: Primary | ICD-10-CM

## 2023-06-14 PROCEDURE — 99213 OFFICE O/P EST LOW 20 MIN: CPT

## 2023-06-14 NOTE — PROGRESS NOTES
Office Visit- Urology  Absiai Temple 1955 MRN: [id number]      Assessment/Discussion/Plan    78 y o  male managed by     1  Elevated PSA  -Last PSA was measured at 4 8 on 05/10/2023 up from 4 1 in 2021  -KAITY demonstrated asymmetric firmness/induration on the right lobe of the prostate which  was potentially concerning for prostate cancer given rise in PSA  -At initial consultation discussed prostate biopsy versus MRI  Patient wanted to hold off on prostate biopsy for a number of months and was agreeable to obtain an MRI   -MRI of the prostate returned a PI-RADS 2  Had a long discussion with patient today  He was upset my first recommendation was prostate biopsy over MRI  I explained that usually in the context of an elevated PSA with abnormality on the prostate exam we typically obtain a transrectal biopsy for expediency of diagnosis  Reviewed with patient that an MRI helps to risk stratify for need for biopsy and that the only biopsy make the diagnosis of prostate cancer  -Advised that patient's MRI of the prostate is reassuring at PI-RADS 2  Noted prostamegaly with volume of 125 mL  Due to the abnormality felt on prostate exam I discussed that I still think it would be within reason to consider biopsy (albeit a weaker indication than prior to MRI ) but patient is not agreeable at this time  I then advised that we reevaluate patient's PSA/KAITY with a 3-6 month follow-up with MD    However-patient's spouse voiced that she was to hold off on that  -My recommendation remains to follow-up with MD with repeat PSA in 3-6 months and KAITY -patient left the office without making follow-up    2  Prostamegaly  -MRI of the prostate revealed prostamegaly  -Patient denies bothersome urinary symptoms  -Patient refused PVR to evaluate for today    Chief Complaint:   Aislinn Osorio is a 78 y o  male presenting to the office for a follow up visit regarding  elevated PSA           Subjective    Hx at time of initial consultation -Patient is a 58-year-old male with past surgical history of right total hip replacement  -No past urologic history  -Presents today for evaluation of elevated PSA  Measured 4 8 in May 2023  Was previously 4 1 in June 2021  Patient states that PSA previously in 2017 was high as 4 2 but on recheck was 3 6  -He has had no MRI or biopsy of the prostate for further evaluation  -No known family history of prostate cancer  -No known family history of ovarian, breast, pancreatic cancer  -He denies bothersome urinary symptoms including sensation of incomplete bladder emptying, weak urinary stream, frequency, urgency, gross hematuria, dysuria  -He was told in the past that he had an enlarged prostate  No history of prostatitis  -Last prostate exam was in 2017 without noted abnormality besides being enlarged    -He does not take any medications for urinary symptoms including finasteride  -No sexual activity/cycling/instrumentation prior to obtainment of most recent PSA     Hx today  -obtained  MRI of the prostate which revealed PI-RADS 2 with no dominant lesion    Prostamegaly to the degree of 125 mL  -Patient denies bothersome urinary symptoms    AUA SYMPTOM SCORE    Flowsheet Row Most Recent Value   AUA SYMPTOM SCORE    How often have you had a sensation of not emptying your bladder completely after you finished urinating? 0 (P)     How often have you had to urinate again less than two hours after you finished urinating? 2 (P)     How often have you found you stopped and started again several times when you urinate? 0 (P)     How often have you found it difficult to postpone urination? 0 (P)     How often have you had a weak urinary stream? 0 (P)     How often have you had to push or strain to begin urination? 0 (P)     How many times did you most typically get up to urinate from the time you went to bed at night until the time you got up in the morning? 1 (P)     Quality of Life: If you were to spend the rest of your life with your urinary condition just the way it is now, how would you feel about that? 2 (P)     AUA SYMPTOM SCORE 3 (P)           ROS:   Review of Systems   Constitutional: Negative  Negative for chills, fatigue and fever  HENT: Negative  Eyes: Negative  Respiratory: Negative  Negative for shortness of breath  Cardiovascular: Negative  Negative for chest pain  Gastrointestinal: Negative  Endocrine: Negative  Musculoskeletal: Negative  Allergic/Immunologic: Negative  Neurological: Negative  Psychiatric/Behavioral: Negative            Past Medical History  Past Medical History:   Diagnosis Date   • Arthritis        Past Surgical History  Past Surgical History:   Procedure Laterality Date   • HIP SURGERY Right 07/18/2022   • REPAIR QUADRICEPS / HAMSTRING MUSCLE Bilateral 2004       Past Family History  Family History   Problem Relation Age of Onset   • Hypertension Mother    • Heart failure Mother    • Stroke Mother    • Diabetes Mother    • Hypertension Father    • Heart failure Father    • Heart attack Father    • No Known Problems Sister    • No Known Problems Brother    • No Known Problems Maternal Aunt    • No Known Problems Maternal Uncle    • No Known Problems Paternal Aunt    • No Known Problems Paternal Uncle    • No Known Problems Maternal Grandmother    • No Known Problems Maternal Grandfather    • No Known Problems Paternal Grandmother    • No Known Problems Paternal Grandfather    • ADD / ADHD Neg Hx    • Anesthesia problems Neg Hx    • Cancer Neg Hx    • Clotting disorder Neg Hx    • Collagen disease Neg Hx    • Dislocations Neg Hx    • Learning disabilities Neg Hx    • Neurological problems Neg Hx    • Osteoporosis Neg Hx    • Rheumatologic disease Neg Hx    • Scoliosis Neg Hx    • Vascular Disease Neg Hx        Past Social history  Social History     Socioeconomic History   • Marital status: /Civil Union     Spouse name: Not on file   • Number of children: Not on "file   • Years of education: Not on file   • Highest education level: Not on file   Occupational History   • Not on file   Tobacco Use   • Smoking status: Never   • Smokeless tobacco: Never   Vaping Use   • Vaping Use: Never used   Substance and Sexual Activity   • Alcohol use: Yes     Comment: occ  a beer every couple of weeks    • Drug use: No   • Sexual activity: Not on file   Other Topics Concern   • Not on file   Social History Narrative   • Not on file     Social Determinants of Health     Financial Resource Strain: Not on file   Food Insecurity: Not on file   Transportation Needs: Not on file   Physical Activity: Not on file   Stress: Not on file   Social Connections: Not on file   Intimate Partner Violence: Not on file   Housing Stability: Not on file       Current Medications  Current Outpatient Medications   Medication Sig Dispense Refill   • furosemide (LASIX) 20 mg tablet take 1 tablet by mouth once daily if needed (FOR SWELLING IN BOTH LEGS) 30 tablet 0   • multivitamin (THERAGRAN) TABS Take 1 tablet by mouth daily     • triamcinolone (KENALOG) 0 1 % cream Apply topically 2 (two) times a day 30 g 1     No current facility-administered medications for this visit  Allergies  Allergies   Allergen Reactions   • Aleve [Naproxen] Rash   • Penicillins Rash     Reaction Date: [id number];        OBJECTIVE    Vitals   Vitals:    06/14/23 0912   BP: 130/78   BP Location: Left arm   Patient Position: Sitting   Cuff Size: Large   Pulse: 99   Resp: 16   SpO2: 96%   Weight: 88 4 kg (194 lb 12 8 oz)   Height: 5' 9\" (1 753 m)       Physical Exam  Constitutional:       General: He is not in acute distress  Appearance: Normal appearance  He is normal weight  He is not ill-appearing or toxic-appearing  HENT:      Head: Normocephalic and atraumatic  Eyes:      Conjunctiva/sclera: Conjunctivae normal    Cardiovascular:      Rate and Rhythm: Normal rate     Pulmonary:      Effort: Pulmonary effort is normal  No " "respiratory distress  Musculoskeletal:         General: Normal range of motion  Cervical back: Normal range of motion and neck supple  Skin:     General: Skin is warm and dry  Neurological:      General: No focal deficit present  Mental Status: He is alert and oriented to person, place, and time  Cranial Nerves: No cranial nerve deficit  Psychiatric:         Mood and Affect: Mood normal          Behavior: Behavior normal          Thought Content: Thought content normal           Labs:     Lab Results   Component Value Date    PSA 4 8 (H) 05/10/2023    PSA 4 1 (H) 06/16/2021     Lab Results   Component Value Date    CREATININE 0 91 05/10/2023      No results found for: \"HGBA1C\"  Lab Results   Component Value Date    BUN 17 05/10/2023    CALCIUM 8 2 (L) 09/24/2022     05/10/2023    CO2 23 05/10/2023    CREATININE 0 91 05/10/2023    K 4 7 05/10/2023       I have personally reviewed all pertinent lab results and reviewed with patient    Imaging     Narrative & Impression   MULTIPARAMETRIC MRI OF THE PROSTATE WITH AND WITHOUT CONTRAST-WITH 3-D POSTPROCESSING      INDICATION:   R97 20: Elevated prostate specific antigen (PSA)      COMPARISON: None     PSA LEVEL: 4 8 ng/mL on May 10, 2023  PRIOR BIOPSY DATE: No prior biopsy  BIOPSY RESULTS: Not applicable      TECHNIQUE: The following pulse sequences were obtained:  Small field-of-view axial T1-weighted and multiplanar T2-weighted images; DWI axial and ADC map; large field of view axial T2 weighted images; T1w in-phase and opposed-phase axials of entire pelvis   and dynamic 3D T1w axial before and during IV contrast injection         CONTRAST:  Gadobutrol (Gadavist) 9 mL of Gadobutrol injection (SINGLE-DOSE)     TECHNICAL LIMITATIONS: None      FINDINGS:     PROSTATE:  Size: 6 9 x 6 3 x 6 1 cm = 124 7 cc  Post-biopsy hemorrhage:  None  Central gland enlargement (BPH): Marked      Focal lesions - No dominant lesion   Heterogeneous peripheral " zone  PI-RADSv2 1 Category 2 - Low (clinically significant cancer unlikely)      SEMINAL VESICLES: Unremarkable     Note: Clinically significant cancer is defined on pathology/histology as Faizan score greater than or equal to 7, and/or volume of greater than or equal to 0 5 mL, and/or extraprostatic extension      URINARY BLADDER: Unremarkable      LYMPH NODES: No pelvic lymphadenopathy      BONES: No suspicious osseous lesion  Susceptibility from right hip arthroplasty      IMPRESSION:     1  PI-RADSv2 1 Category 2 - Low (clinically significant cancer is unlikely to be present)      2  Marked BPH with calculated prostate volume of 125 cc      Prostate gland boundaries were segmented using 3D advanced post-processing on an independent [employer] system workstation with active physician participation      Workstation performed: [id number]       Ros Cortes PA-C  Date: 6/14/2023 Time: 9:35 AM  [facility]    This note was written using fluency dictation software  Please excuse any resulting minor grammatical errors

## 2023-06-21 ENCOUNTER — TELEPHONE (OUTPATIENT)
Dept: FAMILY MEDICINE CLINIC | Facility: CLINIC | Age: 68
End: 2023-06-21

## 2023-08-10 ENCOUNTER — RA CDI HCC (OUTPATIENT)
Dept: OTHER | Facility: HOSPITAL | Age: 68
End: 2023-08-10

## 2023-08-10 NOTE — PROGRESS NOTES
[facility] coding opportunities       Chart reviewed, no opportunity found: [address]        Patients Insurance     Medicare Insurance: [employer] Advantage

## 2023-08-16 ENCOUNTER — OFFICE VISIT (OUTPATIENT)
Dept: FAMILY MEDICINE CLINIC | Facility: CLINIC | Age: 68
End: 2023-08-16
Payer: COMMERCIAL

## 2023-08-16 VITALS
RESPIRATION RATE: 14 BRPM | DIASTOLIC BLOOD PRESSURE: 70 MMHG | HEART RATE: 58 BPM | TEMPERATURE: 98.1 F | WEIGHT: 189 LBS | SYSTOLIC BLOOD PRESSURE: 110 MMHG | OXYGEN SATURATION: 98 % | BODY MASS INDEX: 28.64 KG/M2 | HEIGHT: 68 IN

## 2023-08-16 DIAGNOSIS — Z86.711 HISTORY OF PULMONARY EMBOLUS (PE): ICD-10-CM

## 2023-08-16 DIAGNOSIS — Z00.00 MEDICARE ANNUAL WELLNESS VISIT, SUBSEQUENT: Primary | ICD-10-CM

## 2023-08-16 DIAGNOSIS — Z96.641 HISTORY OF RIGHT HIP REPLACEMENT: ICD-10-CM

## 2023-08-16 DIAGNOSIS — N40.0 BENIGN PROSTATIC HYPERPLASIA WITHOUT URINARY OBSTRUCTION: ICD-10-CM

## 2023-08-16 PROBLEM — D64.9 ANEMIA: Status: RESOLVED | Noted: 2022-09-29 | Resolved: 2023-08-16

## 2023-08-16 PROBLEM — L21.9 SEBORRHEIC DERMATITIS: Status: RESOLVED | Noted: 2021-11-09 | Resolved: 2023-08-16

## 2023-08-16 PROCEDURE — G0439 PPPS, SUBSEQ VISIT: HCPCS | Performed by: STUDENT IN AN ORGANIZED HEALTH CARE EDUCATION/TRAINING PROGRAM

## 2023-08-16 NOTE — PROGRESS NOTES
Assessment and Plan:     Problem List Items Addressed This Visit        Genitourinary    Benign prostatic hyperplasia without urinary obstruction       Other    History of right hip replacement    History of pulmonary embolus (PE)   Other Visit Diagnoses     Medicare annual wellness visit, subsequent    -  Primary        Physical exam completed in office today, patient is doing well post right hip replacement, up-to-date with care gaps, no labs needed today in office, follow-up 6 months recommended. BMI Counseling: Body mass index is 28.74 kg/m². The BMI is above normal. Nutrition recommendations include decreasing portion sizes, encouraging healthy choices of fruits and vegetables and moderation in carbohydrate intake. Exercise recommendations include moderate physical activity 150 minutes/week. Patient referred to PCP. Rationale for BMI follow-up plan is due to patient being overweight or obese. Depression Screening and Follow-up Plan: Patient was screened for depression during today's encounter. They screened negative with a PHQ-2 score of 0. Preventive health issues were discussed with patient, and age appropriate screening tests were ordered as noted in patient's After Visit Summary. Personalized health advice and appropriate referrals for health education or preventive services given if needed, as noted in patient's After Visit Summary. History of Present Illness:     Patient presents for a Medicare Wellness Visit    HPI   Patient Care Team:  Volodymyr Humphrey DO as PCP - General (Internal Medicine)     Review of Systems:     Review of Systems   Constitutional: Negative for chills, fatigue and fever. HENT: Negative for congestion and sore throat. Eyes: Negative for pain and visual disturbance. Respiratory: Negative for shortness of breath and wheezing. Cardiovascular: Negative for chest pain and palpitations.    Gastrointestinal: Negative for abdominal pain, constipation, diarrhea, nausea and vomiting. Genitourinary: Negative for dysuria and frequency. Musculoskeletal: Negative for back pain and neck pain. Skin: Negative for color change and rash. Neurological: Negative for dizziness and headaches. Psychiatric/Behavioral: Negative for agitation and confusion. All other systems reviewed and are negative.        Problem List:     Patient Active Problem List   Diagnosis   • History of right hip replacement   • History of pulmonary embolus (PE)   • Benign prostatic hyperplasia without urinary obstruction      Past Medical and Surgical History:     Past Medical History:   Diagnosis Date   • Arthritis      Past Surgical History:   Procedure Laterality Date   • HIP SURGERY Right 07/18/2022   • REPAIR QUADRICEPS / HAMSTRING MUSCLE Bilateral 2004      Family History:     Family History   Problem Relation Age of Onset   • Hypertension Mother    • Heart failure Mother    • Stroke Mother    • Diabetes Mother    • Hypertension Father    • Heart failure Father    • Heart attack Father    • No Known Problems Sister    • No Known Problems Brother    • No Known Problems Maternal Aunt    • No Known Problems Maternal Uncle    • No Known Problems Paternal Aunt    • No Known Problems Paternal Uncle    • No Known Problems Maternal Grandmother    • No Known Problems Maternal Grandfather    • No Known Problems Paternal Grandmother    • No Known Problems Paternal Grandfather    • ADD / ADHD Neg Hx    • Anesthesia problems Neg Hx    • Cancer Neg Hx    • Clotting disorder Neg Hx    • Collagen disease Neg Hx    • Dislocations Neg Hx    • Learning disabilities Neg Hx    • Neurological problems Neg Hx    • Osteoporosis Neg Hx    • Rheumatologic disease Neg Hx    • Scoliosis Neg Hx    • Vascular Disease Neg Hx       Social History:     Social History     Socioeconomic History   • Marital status: /Civil Union     Spouse name: None   • Number of children: None   • Years of education: None   • Highest education level: None   Occupational History   • None   Tobacco Use   • Smoking status: Never   • Smokeless tobacco: Never   Vaping Use   • Vaping Use: Never used   Substance and Sexual Activity   • Alcohol use: Yes     Comment: occ. a beer every couple of weeks    • Drug use: No   • Sexual activity: None   Other Topics Concern   • None   Social History Narrative   • None     Social Determinants of Health     Financial Resource Strain: Low Risk  (8/15/2023)    Overall Financial Resource Strain (CARDIA)    • Difficulty of Paying Living Expenses: Not hard at all   Food Insecurity: Not on file   Transportation Needs: No Transportation Needs (8/15/2023)    PRAPARE - Transportation    • Lack of Transportation (Medical): No    • Lack of Transportation (Non-Medical): No   Physical Activity: Not on file   Stress: Not on file   Social Connections: Not on file   Intimate Partner Violence: Not on file   Housing Stability: Not on file      Medications and Allergies:     Current Outpatient Medications   Medication Sig Dispense Refill   • triamcinolone (KENALOG) 0.1 % cream Apply topically 2 (two) times a day 30 g 1     No current facility-administered medications for this visit. Allergies   Allergen Reactions   • Aleve [Naproxen] Rash   • Penicillins Rash     Reaction Date: [id number];       Immunizations:     Immunization History   Administered Date(s) Administered   • COVID-19 MODERNA VACC 0.5 ML IM 04/08/2021, 05/06/2021   • Td (adult), Unspecified 12/01/2017   • Zoster Vaccine Recombinant 03/22/2021      Health Maintenance:         Topic Date Due   • Hepatitis C Screening  Never done   • Colorectal Cancer Screening  08/03/2026         Topic Date Due   • COVID-19 Vaccine (3 - Moderna series) 07/01/2021   • Influenza Vaccine (1) 09/01/2023      Medicare Screening Tests and Risk Assessments:     Bridget Auguste is here for his Subsequent Wellness visit.  Last Medicare Wellness visit information reviewed, patient interviewed and updates made to the record today. Health Risk Assessment:   Patient rates overall health as very good. Patient feels that their physical health rating is much better. Patient is very satisfied with their life. Eyesight was rated as same. Hearing was rated as same. Patient feels that their emotional and mental health rating is much better. Patients states they are never, rarely angry. Patient states they are never, rarely unusually tired/fatigued. Pain experienced in the last 7 days has been none. Patient states that he has experienced no weight loss or gain in last 6 months. Depression Screening:   PHQ-2 Score: 0      Fall Risk Screening: In the past year, patient has experienced: no history of falling in past year      Home Safety:  Patient does not have trouble with stairs inside or outside of their home. Patient has working smoke alarms and has working carbon monoxide detector. Home safety hazards include: none. Nutrition:   Current diet is Regular. Medications:   Patient is not currently taking any over-the-counter supplements. Patient is able to manage medications. Activities of Daily Living (ADLs)/Instrumental Activities of Daily Living (IADLs):   Walk and transfer into and out of bed and chair?: Yes  Dress and groom yourself?: Yes    Bathe or shower yourself?: Yes    Feed yourself?  Yes  Do your laundry/housekeeping?: Yes  Manage your money, pay your bills and track your expenses?: Yes  Make your own meals?: Yes    Do your own shopping?: Yes    Durable Medical Equipment Suppliers  n/a    Previous Hospitalizations:   Any hospitalizations or ED visits within the last 12 months?: No      Advance Care Planning:   Living will: Yes    Durable POA for healthcare: No    Advanced directive: Yes    Advanced directive counseling given: Yes      Cognitive Screening:   Provider or family/friend/caregiver concerned regarding cognition?: No    PREVENTIVE SCREENINGS      Cardiovascular Screening: General: Screening Current      Diabetes Screening:     General: Screening Current      Colorectal Cancer Screening:     General: Screening Current      Prostate Cancer Screening:    General: Screening Current      Osteoporosis Screening:    General: Screening Not Indicated      Abdominal Aortic Aneurysm (AAA) Screening:    Risk factors include: age between 70-77 yo        Lung Cancer Screening:     General: Screening Not Indicated      Hepatitis C Screening:    General: Screening Current    Screening, Brief Intervention, and Referral to Treatment (SBIRT)    Screening  Typical number of drinks in a day: 0  Typical number of drinks in a week: 0  Interpretation: Low risk drinking behavior. AUDIT-C Screenin) How often did you have a drink containing alcohol in the past year? monthly or less  2) How many drinks did you have on a typical day when you were drinking in the past year? 1 to 2  3) How often did you have 6 or more drinks on one occasion in the past year? never    AUDIT-C Score: 1  Interpretation: Score 0-3 (male): Negative screen for alcohol misuse    Single Item Drug Screening:  How often have you used an illegal drug (including marijuana) or a prescription medication for non-medical reasons in the past year? never    Single Item Drug Screen Score: 0  Interpretation: Negative screen for possible drug use disorder    Brief Intervention  Alcohol & drug use screenings were reviewed. No concerns regarding substance use disorder identified. Other Counseling Topics:   Car/seat belt/driving safety, skin self-exam, sunscreen and calcium and vitamin D intake and regular weightbearing exercise. No results found.      Physical Exam:     /70 (BP Location: Left arm, Patient Position: Sitting, Cuff Size: Standard)   Pulse 58   Temp 98.1 °F (36.7 °C) (Temporal)   Resp 14   Ht 5' 8" (1.727 m)   Wt 85.7 kg (189 lb)   SpO2 98%   BMI 28.74 kg/m²     Physical Exam  Constitutional:       General: He is not in acute distress. HENT:      Head: Normocephalic and atraumatic. Eyes:      General: No scleral icterus. Conjunctiva/sclera: Conjunctivae normal.   Cardiovascular:      Rate and Rhythm: Normal rate and regular rhythm. Pulses: Normal pulses. Heart sounds: No murmur heard. Pulmonary:      Effort: Pulmonary effort is normal. No respiratory distress. Breath sounds: Normal breath sounds. No wheezing or rales. Abdominal:      General: Bowel sounds are normal. There is no distension. Palpations: Abdomen is soft. Tenderness: There is no abdominal tenderness. There is no guarding. Musculoskeletal:         General: No swelling. Normal range of motion. Skin:     General: Skin is warm and dry. Capillary Refill: Capillary refill takes less than 2 seconds. Neurological:      General: No focal deficit present. Mental Status: He is alert and oriented to person, place, and time. Mental status is at baseline.    Psychiatric:         Mood and Affect: Mood normal.         Behavior: Behavior normal.          Honor Bam,

## 2023-08-16 NOTE — PATIENT INSTRUCTIONS
Medicare Preventive Visit Patient Instructions  Thank you for completing your Welcome to Medicare Visit or Medicare Annual Wellness Visit today. Your next wellness visit will be due in one year (8/16/2024). The screening/preventive services that you may require over the next 5-10 years are detailed below. Some tests may not apply to you based off risk factors and/or age. Screening tests ordered at today's visit but not completed yet may show as past due. Also, please note that scanned in results may not display below. Preventive Screenings:  Service Recommendations Previous Testing/Comments   Colorectal Cancer Screening  · Colonoscopy    · Fecal Occult Blood Test (FOBT)/Fecal Immunochemical Test (FIT)  · Fecal DNA/Cologuard Test  · Flexible Sigmoidoscopy Age: 43-73 years old   Colonoscopy: every 10 years (May be performed more frequently if at higher risk)  OR  FOBT/FIT: every 1 year  OR  Cologuard: every 3 years  OR  Sigmoidoscopy: every 5 years  Screening may be recommended earlier than age 39 if at higher risk for colorectal cancer. Also, an individualized decision between you and your healthcare provider will decide whether screening between the ages of 77-80 would be appropriate.  Colonoscopy: 08/03/2016  FOBT/FIT: Not on file  Cologuard: Not on file  Sigmoidoscopy: Not on file    Screening Current     Prostate Cancer Screening Individualized decision between patient and health care provider in men between ages of 53-66   Medicare will cover every 12 months beginning on the day after your 50th birthday PSA: 4.8 ng/mL     Screening Current     Hepatitis C Screening Once for adults born between [address] [place] [address]  More frequently in patients at high risk for Hepatitis C Hep C Antibody: Not on file        Diabetes Screening 1-2 times per year if you're at risk for diabetes or have pre-diabetes Fasting glucose: No results in last 5 years (No results in last 5 years)  A1C: No results in last 5 years (No results in last 5 years)  Screening Current   Cholesterol Screening Once every 5 years if you don't have a lipid disorder. May order more often based on risk factors. Lipid panel: 05/10/2023  Screening Current      Other Preventive Screenings Covered by Medicare:  1. Abdominal Aortic Aneurysm (AAA) Screening: covered once if your at risk. You're considered to be at risk if you have a family history of AAA or a male between the age of 70-76 who smoking at least 100 cigarettes in your lifetime. 2. Lung Cancer Screening: covers low dose CT scan once per year if you meet all of the following conditions: (1) Age 48-67; (2) No signs or symptoms of lung cancer; (3) Current smoker or have quit smoking within the last 15 years; (4) You have a tobacco smoking history of at least 20 pack years (packs per day x number of years you smoked); (5) You get a written order from a healthcare provider. 3. Glaucoma Screening: covered annually if you're considered high risk: (1) You have diabetes OR (2) Family history of glaucoma OR (3)  aged 48 and older OR (3)  American aged 72 and older  3. Osteoporosis Screening: covered every 2 years if you meet one of the following conditions: (1) Have a vertebral abnormality; (2) On glucocorticoid therapy for more than 3 months; (3) Have primary hyperparathyroidism; (4) On osteoporosis medications and need to assess response to drug therapy. 5. HIV Screening: covered annually if you're between the age of 14-79. Also covered annually if you are younger than 13 and older than 72 with risk factors for HIV infection. For pregnant patients, it is covered up to 3 times per pregnancy.     Immunizations:  Immunization Recommendations   Influenza Vaccine Annual influenza vaccination during flu season is recommended for all persons aged >= 6 months who do not have contraindications   Pneumococcal Vaccine   * Pneumococcal conjugate vaccine = PCV13 (Prevnar 13), PCV15 (Vaxneuvance), PCV20 (Prevnar 20)  * Pneumococcal polysaccharide vaccine = PPSV23 (Pneumovax) Adults 2364 years old: 1-3 doses may be recommended based on certain risk factors  Adults 72 years old: 1-2 doses may be recommended based off what pneumonia vaccine you previously received   Hepatitis B Vaccine 3 dose series if at intermediate or high risk (ex: diabetes, end stage renal disease, liver disease)   Tetanus (Td) Vaccine - COST NOT COVERED BY MEDICARE PART B Following completion of primary series, a booster dose should be given every 10 years to maintain immunity against tetanus. Td may also be given as tetanus wound prophylaxis. Tdap Vaccine - COST NOT COVERED BY MEDICARE PART B Recommended at least once for all adults. For pregnant patients, recommended with each pregnancy. Shingles Vaccine (Shingrix) - COST NOT COVERED BY MEDICARE PART B  2 shot series recommended in those aged 48 and above     Health Maintenance Due:      Topic Date Due   • Hepatitis C Screening  Never done   • Colorectal Cancer Screening  08/03/2026     Immunizations Due:      Topic Date Due   • COVID-19 Vaccine (3 - Moderna series) 07/01/2021   • Influenza Vaccine (1) 09/01/2023     Advance Directives   What are advance directives? Advance directives are legal documents that state your wishes and plans for medical care. These plans are made ahead of time in case you lose your ability to make decisions for yourself. Advance directives can apply to any medical decision, such as the treatments you want, and if you want to donate organs. What are the types of advance directives? There are many types of advance directives, and each state has rules about how to use them. You may choose a combination of any of the following:  · Living will: This is a written record of the treatment you want. You can also choose which treatments you do not want, which to limit, and which to stop at a certain time.  This includes surgery, medicine, IV fluid, and tube feedings. · Durable power of  for [facility]): This is a written record that states who you want to make healthcare choices for you when you are unable to make them for yourself. This person, called a proxy, is usually a family member or a friend. You may choose more than 1 proxy. · Do not resuscitate (DNR) order:  A DNR order is used in case your heart stops beating or you stop breathing. It is a request not to have certain forms of treatment, such as CPR. A DNR order may be included in other types of advance directives. · Medical directive: This covers the care that you want if you are in a coma, near death, or unable to make decisions for yourself. You can list the treatments you want for each condition. Treatment may include pain medicine, surgery, blood transfusions, dialysis, IV or tube feedings, and a ventilator (breathing machine). · Values history: This document has questions about your views, beliefs, and how you feel and think about life. This information can help others choose the care that you would choose. Why are advance directives important? An advance directive helps you control your care. Although spoken wishes may be used, it is better to have your wishes written down. Spoken wishes can be misunderstood, or not followed. Treatments may be given even if you do not want them. An advance directive may make it easier for your family to make difficult choices about your care. Weight Management   Why it is important to manage your weight:  Being overweight increases your risk of health conditions such as heart disease, high blood pressure, type 2 diabetes, and certain types of cancer. It can also increase your risk for osteoarthritis, sleep apnea, and other respiratory problems. Aim for a slow, steady weight loss. Even a small amount of weight loss can lower your risk of health problems.   How to lose weight safely:  A safe and healthy way to lose weight is to eat fewer calories and get regular exercise. You can lose up about 1 pound a week by decreasing the number of calories you eat by 500 calories each day. Healthy meal plan for weight management:  A healthy meal plan includes a variety of foods, contains fewer calories, and helps you stay healthy. A healthy meal plan includes the following:  · Eat whole-grain foods more often. A healthy meal plan should contain fiber. Fiber is the part of grains, fruits, and vegetables that is not broken down by your body. Whole-grain foods are healthy and provide extra fiber in your diet. Some examples of whole-grain foods are whole-wheat breads and pastas, oatmeal, brown rice, and bulgur. · Eat a variety of vegetables every day. Include dark, leafy greens such as spinach, kale, jazmin greens, and mustard greens. Eat yellow and orange vegetables such as carrots, sweet potatoes, and winter squash. · Eat a variety of fruits every day. Choose fresh or canned fruit (canned in its own juice or light syrup) instead of juice. Fruit juice has very little or no fiber. · Eat low-fat dairy foods. Drink fat-free (skim) milk or 1% milk. Eat fat-free yogurt and low-fat cottage cheese. Try low-fat cheeses such as mozzarella and other reduced-fat cheeses. · Choose meat and other protein foods that are low in fat. Choose beans or other legumes such as split peas or lentils. Choose fish, skinless poultry (chicken or turkey), or lean cuts of red meat (beef or pork). Before you cook meat or poultry, cut off any visible fat. · Use less fat and oil. Try baking foods instead of frying them. Add less fat, such as margarine, sour cream, regular salad dressing and mayonnaise to foods. Eat fewer high-fat foods. Some examples of high-fat foods include french fries, doughnuts, ice cream, and cakes. · Eat fewer sweets. Limit foods and drinks that are high in sugar. This includes candy, cookies, regular soda, and sweetened drinks.   Exercise:  Exercise at least 30 minutes per day on most days of the week. Some examples of exercise include walking, biking, dancing, and swimming. You can also fit in more physical activity by taking the stairs instead of the elevator or parking farther away from stores. Ask your healthcare provider about the best exercise plan for you. © Copyright [employer] 2018 Information is for End User's use only and may not be sold, redistributed or otherwise used for commercial purposes.  All illustrations and images included in CareNotes® are the copyrighted property of A.D.A.M., Inc. or  [employer]

## 2024-01-09 ENCOUNTER — OFFICE VISIT (OUTPATIENT)
Dept: FAMILY MEDICINE CLINIC | Facility: CLINIC | Age: 69
End: 2024-01-09
Payer: COMMERCIAL

## 2024-01-09 VITALS
HEART RATE: 86 BPM | HEIGHT: 68 IN | TEMPERATURE: 98.2 F | SYSTOLIC BLOOD PRESSURE: 136 MMHG | DIASTOLIC BLOOD PRESSURE: 86 MMHG | OXYGEN SATURATION: 99 % | RESPIRATION RATE: 16 BRPM | BODY MASS INDEX: 29.77 KG/M2 | WEIGHT: 196.4 LBS

## 2024-01-09 DIAGNOSIS — K40.90 RIGHT INGUINAL HERNIA: Primary | ICD-10-CM

## 2024-01-09 DIAGNOSIS — Z86.711 HISTORY OF PULMONARY EMBOLUS (PE): ICD-10-CM

## 2024-01-09 DIAGNOSIS — Z96.641 HISTORY OF RIGHT HIP REPLACEMENT: ICD-10-CM

## 2024-01-09 DIAGNOSIS — N40.0 BENIGN PROSTATIC HYPERPLASIA WITHOUT URINARY OBSTRUCTION: ICD-10-CM

## 2024-01-09 PROBLEM — M72.2 PLANTAR FASCIITIS: Status: ACTIVE | Noted: 2023-10-17

## 2024-01-09 PROCEDURE — 99214 OFFICE O/P EST MOD 30 MIN: CPT | Performed by: STUDENT IN AN ORGANIZED HEALTH CARE EDUCATION/TRAINING PROGRAM

## 2024-01-09 NOTE — PROGRESS NOTES
Clinic Visit Note  Dorian Goldberg 68 y.o. male   MRN: [id number]    Assessment and Plan      Diagnoses and all orders for this visit:    Right inguinal hernia  Recommend ultrasound to confirm right inguinal hernia, follow-up with general surgery recommended for definitive treatment, no signs of gangrene or incarceration.  -     US groin/inguinal area; Future  -     Ambulatory Referral to General Surgery; Future    History of right hip replacement  Asymptomatic    History of pulmonary embolus (PE)    Benign prostatic hyperplasia without urinary obstruction  Continue to monitor symptoms, controlled    My impressions and treatment recommendations were discussed in detail with the patient who verbalized understanding and had no further questions.  Discharge instructions were provided.    Subjective     Chief Complaint: ACV    History of Present Illness:    Patient is a pleasant 68-year-old male coming in for acute care visit secondary to concern for right inguinal hernia after Valsalva maneuver.    The following portions of the patient's history were reviewed and updated as appropriate: allergies, current medications, past family history, past medical history, past social history, past surgical history and problem list.    REVIEW OF SYSTEMS:  A complete 12-point review of systems is negative other than that noted in the HPI.    Review of Systems   Constitutional:  Negative for chills, fatigue and fever.   Respiratory:  Negative for cough, shortness of breath and wheezing.    Cardiovascular:  Negative for chest pain, palpitations and leg swelling.   Gastrointestinal:  Negative for abdominal distention, constipation, diarrhea, nausea and vomiting.   Neurological:  Negative for dizziness and headaches.         Current Outpatient Medications:   •  triamcinolone (KENALOG) 0.1 % cream, Apply topically 2 (two) times a day, Disp: 30 g, Rfl: 1  Past Medical History:   Diagnosis Date   • Arthritis      Past Surgical History:    Procedure Laterality Date   • HIP SURGERY Right 07/18/2022   • REPAIR QUADRICEPS / HAMSTRING MUSCLE Bilateral 2004     Social History     Socioeconomic History   • Marital status: /Civil Union     Spouse name: Not on file   • Number of children: Not on file   • Years of education: Not on file   • Highest education level: Not on file   Occupational History   • Not on file   Tobacco Use   • Smoking status: Never   • Smokeless tobacco: Never   Vaping Use   • Vaping status: Never Used   Substance and Sexual Activity   • Alcohol use: Yes     Comment: occ. a beer every couple of weeks    • Drug use: No   • Sexual activity: Not on file   Other Topics Concern   • Not on file   Social History Narrative   • Not on file     Social Determinants of Health     Financial Resource Strain: Low Risk  (8/15/2023)    Overall Financial Resource Strain (CARDIA)    • Difficulty of Paying Living Expenses: Not hard at all   Food Insecurity: Not on file   Transportation Needs: No Transportation Needs (8/15/2023)    PRAPARE - Transportation    • Lack of Transportation (Medical): No    • Lack of Transportation (Non-Medical): No   Physical Activity: Not on file   Stress: Not on file   Social Connections: Not on file   Intimate Partner Violence: Not on file   Housing Stability: Not on file     Family History   Problem Relation Age of Onset   • Hypertension Mother    • Heart failure Mother    • Stroke Mother    • Diabetes Mother    • Hypertension Father    • Heart failure Father    • Heart attack Father    • No Known Problems Sister    • No Known Problems Brother    • No Known Problems Maternal Aunt    • No Known Problems Maternal Uncle    • No Known Problems Paternal Aunt    • No Known Problems Paternal Uncle    • No Known Problems Maternal Grandmother    • No Known Problems Maternal Grandfather    • No Known Problems Paternal Grandmother    • No Known Problems Paternal Grandfather    • ADD / ADHD Neg Hx    • Anesthesia problems Neg Hx   "  • Cancer Neg Hx    • Clotting disorder Neg Hx    • Collagen disease Neg Hx    • Dislocations Neg Hx    • Learning disabilities Neg Hx    • Neurological problems Neg Hx    • Osteoporosis Neg Hx    • Rheumatologic disease Neg Hx    • Scoliosis Neg Hx    • Vascular Disease Neg Hx      Allergies   Allergen Reactions   • Aleve [Naproxen] Rash   • Penicillins Rash     Reaction Date: 04Jun2004;        Objective     Vitals:    01/09/24 1128   BP: 136/86   BP Location: Left arm   Patient Position: Sitting   Cuff Size: Large   Pulse: 86   Resp: 16   Temp: 98.2 °F (36.8 °C)   SpO2: 99%   Weight: 89.1 kg (196 lb 6.4 oz)   Height: 5' 8\" (1.727 m)       Physical Exam:     GENERAL: NAD, pleasant   HEENT:  NC/AT, PERRL, EOMI, no scleral icterus  CARDIAC:  RRR, +S1/S2, no S3/S4 appreciated, no m/g/r  PULMONARY:  CTA B/L, no wheezing/rales/rhonci, non-labored breathing  ABDOMEN:  Soft, NT/ND, no rebound/guarding/rigidity  Extremities:. No edema, cyanosis, or clubbing  Musculoskeletal:  Full range of motion intact in all extremities   NEUROLOGIC: Grossly intact, no focal deficits  SKIN:  No rashes or erythema noted on exposed skin  Psych: Normal affect, mood stable    Suspicion for right inguinal hernia, nonincarcerated    ==  PLEASE NOTE:  This encounter was completed utilizing the [employer] Direct Speech Voice Recognition Software. Grammatical errors, random word insertions, pronoun errors and incomplete sentences are occasional consequences of the system due to software limitations, ambient noise and hardware issues.These may be missed by proof reading prior to affixing electronic signature. Any questions or concerns about the content, text or information contained within the body of this dictation should be directly addressed to the physician for clarification. Please do not hesitate to call me directly if you have any any questions or concerns.    Jeevan Huntley, DO  [facility] Internal [facility]   [facility] " Practice

## 2024-01-12 ENCOUNTER — HOSPITAL ENCOUNTER (OUTPATIENT)
Dept: RADIOLOGY | Facility: HOSPITAL | Age: 69
Discharge: HOME | End: 2024-01-12
Attending: STUDENT IN AN ORGANIZED HEALTH CARE EDUCATION/TRAINING PROGRAM
Payer: COMMERCIAL

## 2024-01-12 DIAGNOSIS — K40.90 RIGHT INGUINAL HERNIA: ICD-10-CM

## 2024-01-12 PROCEDURE — 76705 ECHO EXAM OF ABDOMEN: CPT

## 2024-01-30 ENCOUNTER — APPOINTMENT (OUTPATIENT)
Dept: LAB | Facility: HOSPITAL | Age: 69
End: 2024-01-30
Payer: COMMERCIAL

## 2024-01-30 ENCOUNTER — CONSULT (OUTPATIENT)
Dept: SURGERY | Facility: CLINIC | Age: 69
End: 2024-01-30
Payer: COMMERCIAL

## 2024-01-30 ENCOUNTER — APPOINTMENT (OUTPATIENT)
Dept: LAB | Facility: HOSPITAL | Age: 69
End: 2024-01-30
Attending: SURGERY
Payer: COMMERCIAL

## 2024-01-30 VITALS
SYSTOLIC BLOOD PRESSURE: 133 MMHG | HEIGHT: 68 IN | HEART RATE: 65 BPM | TEMPERATURE: 96.8 F | BODY MASS INDEX: 30.04 KG/M2 | WEIGHT: 198.2 LBS | DIASTOLIC BLOOD PRESSURE: 74 MMHG | OXYGEN SATURATION: 96 %

## 2024-01-30 DIAGNOSIS — K40.90 RIGHT INGUINAL HERNIA: ICD-10-CM

## 2024-01-30 DIAGNOSIS — Z01.818 PREOPERATIVE EXAMINATION: Primary | ICD-10-CM

## 2024-01-30 DIAGNOSIS — Z01.818 PREOPERATIVE EXAMINATION: ICD-10-CM

## 2024-01-30 DIAGNOSIS — R97.20 ELEVATED PSA: ICD-10-CM

## 2024-01-30 LAB
ALBUMIN SERPL BCP-MCNC: 4.1 G/DL (ref 3.5–5)
ALP SERPL-CCNC: 95 U/L (ref 34–104)
ALT SERPL W P-5'-P-CCNC: 25 U/L (ref 7–52)
ANION GAP SERPL CALCULATED.3IONS-SCNC: 3 MMOL/L
AST SERPL W P-5'-P-CCNC: 22 U/L (ref 13–39)
BILIRUB SERPL-MCNC: 0.41 MG/DL (ref 0.2–1)
BUN SERPL-MCNC: 15 MG/DL (ref 5–25)
CALCIUM SERPL-MCNC: 9.1 MG/DL (ref 8.4–10.2)
CHLORIDE SERPL-SCNC: 105 MMOL/L (ref 96–108)
CO2 SERPL-SCNC: 30 MMOL/L (ref 21–32)
CREAT SERPL-MCNC: 0.88 MG/DL (ref 0.6–1.3)
ERYTHROCYTE [DISTWIDTH] IN BLOOD BY AUTOMATED COUNT: 13.1 % (ref 11.6–15.1)
GFR SERPL CREATININE-BSD FRML MDRD: 88 ML/MIN/1.73SQ M
GLUCOSE SERPL-MCNC: 90 MG/DL (ref 65–140)
HCT VFR BLD AUTO: 43.3 % (ref 36.5–49.3)
HGB BLD-MCNC: 14.6 G/DL (ref 12–17)
MCH RBC QN AUTO: 31.9 PG (ref 26.8–34.3)
MCHC RBC AUTO-ENTMCNC: 33.7 G/DL (ref 31.4–37.4)
MCV RBC AUTO: 95 FL (ref 82–98)
PLATELET # BLD AUTO: 189 THOUSANDS/UL (ref 149–390)
PMV BLD AUTO: 10.3 FL (ref 8.9–12.7)
POTASSIUM SERPL-SCNC: 4.5 MMOL/L (ref 3.5–5.3)
PROT SERPL-MCNC: 6.6 G/DL (ref 6.4–8.4)
PSA SERPL-MCNC: 8.44 NG/ML (ref 0–4)
RBC # BLD AUTO: 4.58 MILLION/UL (ref 3.88–5.62)
SODIUM SERPL-SCNC: 138 MMOL/L (ref 135–147)
WBC # BLD AUTO: 5.65 THOUSAND/UL (ref 4.31–10.16)

## 2024-01-30 PROCEDURE — 1160F RVW MEDS BY RX/DR IN RCRD: CPT | Performed by: SURGERY

## 2024-01-30 PROCEDURE — 84153 ASSAY OF PSA TOTAL: CPT

## 2024-01-30 PROCEDURE — 1159F MED LIST DOCD IN RCRD: CPT | Performed by: SURGERY

## 2024-01-30 PROCEDURE — 85027 COMPLETE CBC AUTOMATED: CPT

## 2024-01-30 PROCEDURE — 36415 COLL VENOUS BLD VENIPUNCTURE: CPT

## 2024-01-30 PROCEDURE — 99203 OFFICE O/P NEW LOW 30 MIN: CPT | Performed by: SURGERY

## 2024-01-30 PROCEDURE — 80053 COMPREHEN METABOLIC PANEL: CPT

## 2024-01-30 NOTE — PROGRESS NOTES
"[facility] History and Physical Note:    Assessment:  Moderate size right inguinal hernia, enlarging and causing symptoms.    Pertinent images and available reads personally reviewed  Reviewed ultrasound images and ultrasound report from 12 January 2024  Pertinent notes reviewed  Reviewed the PCP note by Dr. Huntley dated 9 January 2024    Plan:  1.  CBC, CMP, and EKG ordered.  2.  Plan right inguinal hernia repair with mesh.    Chief Complaint:  \"I am here for the hernia on the right\"    HPI  Patient is a 68-year-old gentleman who was recently seen by his PCP, Dr. Huntley, for complaint of a right groin bulge.  He states this has been there for quite some time and recently enlarging with more discomfort.    Ultrasound of the right groin dated 12 January 2024:  IMPRESSION:  Moderate to large right inguinal hernia containing loops of bowel.     Echo May 2023:      Left Ventricle: Left ventricular cavity size is normal. Wall thickness is mildly increased. Systolic function is normal.  Estimated ejection fraction is 55-60%. Wall motion is normal. Diastolic function is normal.    Right Ventricle: Systolic function is normal.    Left Atrium: The atrium is mild to moderately dilated.    Mitral Valve: There is trace regurgitation.    Tricuspid Valve: There is trace regurgitation. RVSP is 34 mmHg.    Pulmonic Valve: There is trace regurgitation.    PMH:  Past Medical History:   Diagnosis Date    Arthritis        PSH:  Past Surgical History:   Procedure Laterality Date    HIP SURGERY Right 07/18/2022    REPAIR QUADRICEPS / HAMSTRING MUSCLE Bilateral 2004       Home Meds:  Current Outpatient Medications on File Prior to Visit   Medication Sig Dispense Refill    triamcinolone (KENALOG) 0.1 % cream Apply topically 2 (two) times a day 30 g 1     No current facility-administered medications on file prior to visit.       Allergies:  Allergies   Allergen Reactions    Aleve [Naproxen] Rash    Penicillins " Rash     Reaction Date: 04Jun2004;        Social Hx:  Social History     Socioeconomic History    Marital status: /Civil Union     Spouse name: Not on file    Number of children: Not on file    Years of education: Not on file    Highest education level: Not on file   Occupational History    Not on file   Tobacco Use    Smoking status: Never    Smokeless tobacco: Never   Vaping Use    Vaping status: Never Used   Substance and Sexual Activity    Alcohol use: Yes     Comment: occ. a beer every couple of weeks     Drug use: No    Sexual activity: Not on file   Other Topics Concern    Not on file   Social History Narrative    Not on file     Social Determinants of Health     Financial Resource Strain: Low Risk  (8/15/2023)    Overall Financial Resource Strain (CARDIA)     Difficulty of Paying Living Expenses: Not hard at all   Food Insecurity: Not on file   Transportation Needs: No Transportation Needs (8/15/2023)    PRAPARE - Transportation     Lack of Transportation (Medical): No     Lack of Transportation (Non-Medical): No   Physical Activity: Not on file   Stress: Not on file   Social Connections: Not on file   Intimate Partner Violence: Not on file   Housing Stability: Not on file        Family Hx:    Family History   Problem Relation Age of Onset    Hypertension Mother     Heart failure Mother     Stroke Mother     Diabetes Mother     Hypertension Father     Heart failure Father     Heart attack Father     No Known Problems Sister     No Known Problems Brother     No Known Problems Maternal Aunt     No Known Problems Maternal Uncle     No Known Problems Paternal Aunt     No Known Problems Paternal Uncle     No Known Problems Maternal Grandmother     No Known Problems Maternal Grandfather     No Known Problems Paternal Grandmother     No Known Problems Paternal Grandfather     ADD / ADHD Neg Hx     Anesthesia problems Neg Hx     Cancer Neg Hx     Clotting disorder Neg Hx     Collagen disease Neg Hx      "Dislocations Neg Hx     Learning disabilities Neg Hx     Neurological problems Neg Hx     Osteoporosis Neg Hx     Rheumatologic disease Neg Hx     Scoliosis Neg Hx     Vascular Disease Neg Hx          Review of Systems   Constitutional:  Negative for chills and fever.   Respiratory: Negative.     Cardiovascular: Negative.    Gastrointestinal: Negative.    Genitourinary: Negative.    Musculoskeletal: Negative.    Neurological: Negative.    All other systems reviewed and are negative.    /74 (BP Location: Left arm, Patient Position: Sitting, Cuff Size: Large)   Pulse 65   Temp (!) 96.8 °F (36 °C) (Core)   Ht 5' 8\" (1.727 m)   Wt 89.9 kg (198 lb 3.2 oz)   SpO2 96%   BMI 30.14 kg/m²       Physical Exam  Vitals reviewed.   Constitutional:       General: He is not in acute distress.     Appearance: Normal appearance.   HENT:      Head: Normocephalic and atraumatic.   Cardiovascular:      Rate and Rhythm: Normal rate and regular rhythm.   Pulmonary:      Effort: Pulmonary effort is normal. No respiratory distress.      Breath sounds: Normal breath sounds.   Abdominal:      General: Abdomen is flat. There is no distension.      Palpations: Abdomen is soft.      Comments: Moderate size right inguinal hernia, reducible.  No obvious left inguinal hernia   Musculoskeletal:         General: Normal range of motion.   Skin:     General: Skin is warm and dry.   Neurological:      General: No focal deficit present.      Mental Status: He is alert.     Pertinent labs reviewed    Pertinent images and available reads personally reviewed  Reviewed ultrasound images and ultrasound report from 12 January 2024  Pertinent notes reviewed  Reviewed the PCP note by Dr. Huntley dated 9 January 2024     Rivas Swartz MD FACS  [facility]  [phone]        "

## 2024-01-30 NOTE — H&P (VIEW-ONLY)
"[facility] History and Physical Note:    Assessment:  Moderate size right inguinal hernia, enlarging and causing symptoms.    Pertinent images and available reads personally reviewed  Reviewed ultrasound images and ultrasound report from 12 January 2024  Pertinent notes reviewed  Reviewed the PCP note by Dr. Huntley dated 9 January 2024    Plan:  1.  CBC, CMP, and EKG ordered.  2.  Plan right inguinal hernia repair with mesh.    Chief Complaint:  \"I am here for the hernia on the right\"    HPI  Patient is a 68-year-old gentleman who was recently seen by his PCP, Dr. Huntley, for complaint of a right groin bulge.  He states this has been there for quite some time and recently enlarging with more discomfort.    Ultrasound of the right groin dated 12 January 2024:  IMPRESSION:  Moderate to large right inguinal hernia containing loops of bowel.     Echo May 2023:      Left Ventricle: Left ventricular cavity size is normal. Wall thickness is mildly increased. Systolic function is normal.  Estimated ejection fraction is 55-60%. Wall motion is normal. Diastolic function is normal.    Right Ventricle: Systolic function is normal.    Left Atrium: The atrium is mild to moderately dilated.    Mitral Valve: There is trace regurgitation.    Tricuspid Valve: There is trace regurgitation. RVSP is 34 mmHg.    Pulmonic Valve: There is trace regurgitation.    PMH:  Past Medical History:   Diagnosis Date    Arthritis        PSH:  Past Surgical History:   Procedure Laterality Date    HIP SURGERY Right 07/18/2022    REPAIR QUADRICEPS / HAMSTRING MUSCLE Bilateral 2004       Home Meds:  Current Outpatient Medications on File Prior to Visit   Medication Sig Dispense Refill    triamcinolone (KENALOG) 0.1 % cream Apply topically 2 (two) times a day 30 g 1     No current facility-administered medications on file prior to visit.       Allergies:  Allergies   Allergen Reactions    Aleve [Naproxen] Rash    Penicillins " Rash     Reaction Date: 04Jun2004;        Social Hx:  Social History     Socioeconomic History    Marital status: /Civil Union     Spouse name: Not on file    Number of children: Not on file    Years of education: Not on file    Highest education level: Not on file   Occupational History    Not on file   Tobacco Use    Smoking status: Never    Smokeless tobacco: Never   Vaping Use    Vaping status: Never Used   Substance and Sexual Activity    Alcohol use: Yes     Comment: occ. a beer every couple of weeks     Drug use: No    Sexual activity: Not on file   Other Topics Concern    Not on file   Social History Narrative    Not on file     Social Determinants of Health     Financial Resource Strain: Low Risk  (8/15/2023)    Overall Financial Resource Strain (CARDIA)     Difficulty of Paying Living Expenses: Not hard at all   Food Insecurity: Not on file   Transportation Needs: No Transportation Needs (8/15/2023)    PRAPARE - Transportation     Lack of Transportation (Medical): No     Lack of Transportation (Non-Medical): No   Physical Activity: Not on file   Stress: Not on file   Social Connections: Not on file   Intimate Partner Violence: Not on file   Housing Stability: Not on file        Family Hx:    Family History   Problem Relation Age of Onset    Hypertension Mother     Heart failure Mother     Stroke Mother     Diabetes Mother     Hypertension Father     Heart failure Father     Heart attack Father     No Known Problems Sister     No Known Problems Brother     No Known Problems Maternal Aunt     No Known Problems Maternal Uncle     No Known Problems Paternal Aunt     No Known Problems Paternal Uncle     No Known Problems Maternal Grandmother     No Known Problems Maternal Grandfather     No Known Problems Paternal Grandmother     No Known Problems Paternal Grandfather     ADD / ADHD Neg Hx     Anesthesia problems Neg Hx     Cancer Neg Hx     Clotting disorder Neg Hx     Collagen disease Neg Hx      "Dislocations Neg Hx     Learning disabilities Neg Hx     Neurological problems Neg Hx     Osteoporosis Neg Hx     Rheumatologic disease Neg Hx     Scoliosis Neg Hx     Vascular Disease Neg Hx          Review of Systems   Constitutional:  Negative for chills and fever.   Respiratory: Negative.     Cardiovascular: Negative.    Gastrointestinal: Negative.    Genitourinary: Negative.    Musculoskeletal: Negative.    Neurological: Negative.    All other systems reviewed and are negative.    /74 (BP Location: Left arm, Patient Position: Sitting, Cuff Size: Large)   Pulse 65   Temp (!) 96.8 °F (36 °C) (Core)   Ht 5' 8\" (1.727 m)   Wt 89.9 kg (198 lb 3.2 oz)   SpO2 96%   BMI 30.14 kg/m²       Physical Exam  Vitals reviewed.   Constitutional:       General: He is not in acute distress.     Appearance: Normal appearance.   HENT:      Head: Normocephalic and atraumatic.   Cardiovascular:      Rate and Rhythm: Normal rate and regular rhythm.   Pulmonary:      Effort: Pulmonary effort is normal. No respiratory distress.      Breath sounds: Normal breath sounds.   Abdominal:      General: Abdomen is flat. There is no distension.      Palpations: Abdomen is soft.      Comments: Moderate size right inguinal hernia, reducible.  No obvious left inguinal hernia   Musculoskeletal:         General: Normal range of motion.   Skin:     General: Skin is warm and dry.   Neurological:      General: No focal deficit present.      Mental Status: He is alert.     Pertinent labs reviewed    Pertinent images and available reads personally reviewed  Reviewed ultrasound images and ultrasound report from 12 January 2024  Pertinent notes reviewed  Reviewed the PCP note by Dr. Huntley dated 9 January 2024     Rivas Swartz MD FACS  [facility]  [phone]          "

## 2024-01-30 NOTE — H&P
"[facility] History and Physical Note:    Assessment:  Moderate size right inguinal hernia, enlarging and causing symptoms.    Pertinent images and available reads personally reviewed  Reviewed ultrasound images and ultrasound report from 12 January 2024  Pertinent notes reviewed  Reviewed the PCP note by Dr. Huntley dated 9 January 2024    Plan:  1.  CBC, CMP, and EKG ordered.  2.  Plan right inguinal hernia repair with mesh.    Chief Complaint:  \"I am here for the hernia on the right\"    HPI  Patient is a 68-year-old gentleman who was recently seen by his PCP, Dr. Huntley, for complaint of a right groin bulge.  He states this has been there for quite some time and recently enlarging with more discomfort.    Ultrasound of the right groin dated 12 January 2024:  IMPRESSION:  Moderate to large right inguinal hernia containing loops of bowel.     Echo May 2023:      Left Ventricle: Left ventricular cavity size is normal. Wall thickness is mildly increased. Systolic function is normal.  Estimated ejection fraction is 55-60%. Wall motion is normal. Diastolic function is normal.    Right Ventricle: Systolic function is normal.    Left Atrium: The atrium is mild to moderately dilated.    Mitral Valve: There is trace regurgitation.    Tricuspid Valve: There is trace regurgitation. RVSP is 34 mmHg.    Pulmonic Valve: There is trace regurgitation.    PMH:  Past Medical History:   Diagnosis Date    Arthritis        PSH:  Past Surgical History:   Procedure Laterality Date    HIP SURGERY Right 07/18/2022    REPAIR QUADRICEPS / HAMSTRING MUSCLE Bilateral 2004       Home Meds:  Current Outpatient Medications on File Prior to Visit   Medication Sig Dispense Refill    triamcinolone (KENALOG) 0.1 % cream Apply topically 2 (two) times a day 30 g 1     No current facility-administered medications on file prior to visit.       Allergies:  Allergies   Allergen Reactions    Aleve [Naproxen] Rash    Penicillins " Rash     Reaction Date: 04Jun2004;        Social Hx:  Social History     Socioeconomic History    Marital status: /Civil Union     Spouse name: Not on file    Number of children: Not on file    Years of education: Not on file    Highest education level: Not on file   Occupational History    Not on file   Tobacco Use    Smoking status: Never    Smokeless tobacco: Never   Vaping Use    Vaping status: Never Used   Substance and Sexual Activity    Alcohol use: Yes     Comment: occ. a beer every couple of weeks     Drug use: No    Sexual activity: Not on file   Other Topics Concern    Not on file   Social History Narrative    Not on file     Social Determinants of Health     Financial Resource Strain: Low Risk  (8/15/2023)    Overall Financial Resource Strain (CARDIA)     Difficulty of Paying Living Expenses: Not hard at all   Food Insecurity: Not on file   Transportation Needs: No Transportation Needs (8/15/2023)    PRAPARE - Transportation     Lack of Transportation (Medical): No     Lack of Transportation (Non-Medical): No   Physical Activity: Not on file   Stress: Not on file   Social Connections: Not on file   Intimate Partner Violence: Not on file   Housing Stability: Not on file        Family Hx:    Family History   Problem Relation Age of Onset    Hypertension Mother     Heart failure Mother     Stroke Mother     Diabetes Mother     Hypertension Father     Heart failure Father     Heart attack Father     No Known Problems Sister     No Known Problems Brother     No Known Problems Maternal Aunt     No Known Problems Maternal Uncle     No Known Problems Paternal Aunt     No Known Problems Paternal Uncle     No Known Problems Maternal Grandmother     No Known Problems Maternal Grandfather     No Known Problems Paternal Grandmother     No Known Problems Paternal Grandfather     ADD / ADHD Neg Hx     Anesthesia problems Neg Hx     Cancer Neg Hx     Clotting disorder Neg Hx     Collagen disease Neg Hx      "Dislocations Neg Hx     Learning disabilities Neg Hx     Neurological problems Neg Hx     Osteoporosis Neg Hx     Rheumatologic disease Neg Hx     Scoliosis Neg Hx     Vascular Disease Neg Hx          Review of Systems   Constitutional:  Negative for chills and fever.   Respiratory: Negative.     Cardiovascular: Negative.    Gastrointestinal: Negative.    Genitourinary: Negative.    Musculoskeletal: Negative.    Neurological: Negative.    All other systems reviewed and are negative.    /74 (BP Location: Left arm, Patient Position: Sitting, Cuff Size: Large)   Pulse 65   Temp (!) 96.8 °F (36 °C) (Core)   Ht 5' 8\" (1.727 m)   Wt 89.9 kg (198 lb 3.2 oz)   SpO2 96%   BMI 30.14 kg/m²       Physical Exam  Vitals reviewed.   Constitutional:       General: He is not in acute distress.     Appearance: Normal appearance.   HENT:      Head: Normocephalic and atraumatic.   Cardiovascular:      Rate and Rhythm: Normal rate and regular rhythm.   Pulmonary:      Effort: Pulmonary effort is normal. No respiratory distress.      Breath sounds: Normal breath sounds.   Abdominal:      General: Abdomen is flat. There is no distension.      Palpations: Abdomen is soft.      Comments: Moderate size right inguinal hernia, reducible.  No obvious left inguinal hernia   Musculoskeletal:         General: Normal range of motion.   Skin:     General: Skin is warm and dry.   Neurological:      General: No focal deficit present.      Mental Status: He is alert.     Pertinent labs reviewed    Pertinent images and available reads personally reviewed  Reviewed ultrasound images and ultrasound report from 12 January 2024  Pertinent notes reviewed  Reviewed the PCP note by Dr. Huntley dated 9 January 2024     Rivas Swartz MD FACS  [facility]  [phone]          "

## 2024-02-01 DIAGNOSIS — R97.20 ELEVATED PSA: Primary | ICD-10-CM

## 2024-02-01 LAB
ATRIAL RATE: 52 BPM
P AXIS: -1 DEGREES
PR INTERVAL: 160 MS
QRS AXIS: 23 DEGREES
QRSD INTERVAL: 84 MS
QT INTERVAL: 410 MS
QTC INTERVAL: 381 MS
T WAVE AXIS: 35 DEGREES
VENTRICULAR RATE: 52 BPM

## 2024-02-14 NOTE — PRE-PROCEDURE INSTRUCTIONS
Pre-Surgery Instructions:   Medication Instructions    triamcinolone (KENALOG) 0.1 % cream Hold day of surgery.    Medication instructions for day surgery reviewed. Please use only a sip of water to take your instructed medications. Avoid all over the counter vitamins, supplements and NSAIDS for one week prior to surgery per anesthesia guidelines. Tylenol is ok to take as needed.     You will receive a call one business day prior to surgery with an arrival time and hospital directions. If your surgery is scheduled on a Monday, the hospital will be calling you on the Friday prior to your surgery. If you have not heard from anyone by 8pm, please call the hospital supervisor through the hospital  at [phone]. ([place] [phone] or [place] [phone]).    Do not eat or drink anything after midnight the night before your surgery, including candy, mints, lifesavers, or chewing gum. Do not drink alcohol 24hrs before your surgery. Try not to smoke at least 24hrs before your surgery.       Follow the pre surgery showering instructions as listed in the “My Surgical Experience Booklet” or otherwise provided by your surgeon's office. Do not use a blade to shave the surgical area 1 week before surgery. It is okay to use a clean electric clippers up to 24 hours before surgery. Do not apply any lotions, creams, including makeup, cologne, deodorant, or perfumes after showering on the day of your surgery. Do not use dry shampoo, hair spray, hair gel, or any type of hair products.     No contact lenses, eye make-up, or artificial eyelashes. Remove nail polish, including gel polish, and any artificial, gel, or acrylic nails if possible. Remove all jewelry including rings and body piercing jewelry.     Wear causal clothing that is easy to take on and off. Consider your type of surgery.    Keep any valuables, jewelry, piercings at home. Please bring any specially ordered equipment (sling, braces) if  indicated.    Arrange for a responsible person to drive you to and from the hospital on the day of your surgery. Visitor Guidelines discussed.     Call the surgeon's office with any new illnesses, exposures, or additional questions prior to surgery.    Please reference your “My Surgical Experience Booklet” for additional information to prepare for your upcoming surgery.

## 2024-02-20 ENCOUNTER — ANESTHESIA EVENT (OUTPATIENT)
Dept: PERIOP | Facility: HOSPITAL | Age: 69
End: 2024-02-20
Payer: COMMERCIAL

## 2024-02-21 ENCOUNTER — HOSPITAL ENCOUNTER (OUTPATIENT)
Facility: HOSPITAL | Age: 69
Setting detail: OUTPATIENT SURGERY
Discharge: HOME | End: 2024-02-21
Attending: SURGERY | Admitting: SURGERY
Payer: COMMERCIAL

## 2024-02-21 ENCOUNTER — ANESTHESIA (OUTPATIENT)
Dept: PERIOP | Facility: HOSPITAL | Age: 69
End: 2024-02-21
Payer: COMMERCIAL

## 2024-02-21 VITALS
RESPIRATION RATE: 18 BRPM | DIASTOLIC BLOOD PRESSURE: 63 MMHG | OXYGEN SATURATION: 99 % | HEIGHT: 68 IN | HEART RATE: 95 BPM | WEIGHT: 195.99 LBS | BODY MASS INDEX: 29.7 KG/M2 | SYSTOLIC BLOOD PRESSURE: 140 MMHG | TEMPERATURE: 98.8 F

## 2024-02-21 DIAGNOSIS — K40.90 RIGHT INGUINAL HERNIA: Primary | ICD-10-CM

## 2024-02-21 PROCEDURE — C1781 MESH (IMPLANTABLE): HCPCS | Performed by: SURGERY

## 2024-02-21 PROCEDURE — 49505 PRP I/HERN INIT REDUC >5 YR: CPT | Performed by: SURGERY

## 2024-02-21 PROCEDURE — 88302 TISSUE EXAM BY PATHOLOGIST: CPT | Performed by: STUDENT IN AN ORGANIZED HEALTH CARE EDUCATION/TRAINING PROGRAM

## 2024-02-21 DEVICE — BARD MESH
Type: IMPLANTABLE DEVICE | Site: GROIN | Status: FUNCTIONAL
Brand: BARD MESH

## 2024-02-21 RX ORDER — ONDANSETRON 2 MG/ML
INJECTION INTRAMUSCULAR; INTRAVENOUS AS NEEDED
Status: DISCONTINUED | OUTPATIENT
Start: 2024-02-21 | End: 2024-02-21

## 2024-02-21 RX ORDER — BUPIVACAINE HYDROCHLORIDE AND EPINEPHRINE 5; 5 MG/ML; UG/ML
INJECTION, SOLUTION PERINEURAL AS NEEDED
Status: DISCONTINUED | OUTPATIENT
Start: 2024-02-21 | End: 2024-02-21 | Stop reason: HOSPADM

## 2024-02-21 RX ORDER — GLYCOPYRROLATE 0.2 MG/ML
INJECTION INTRAMUSCULAR; INTRAVENOUS AS NEEDED
Status: DISCONTINUED | OUTPATIENT
Start: 2024-02-21 | End: 2024-02-21

## 2024-02-21 RX ORDER — HYDROMORPHONE HCL/PF 1 MG/ML
SYRINGE (ML) INJECTION AS NEEDED
Status: DISCONTINUED | OUTPATIENT
Start: 2024-02-21 | End: 2024-02-21

## 2024-02-21 RX ORDER — OXYCODONE HYDROCHLORIDE 5 MG/1
5 TABLET ORAL EVERY 4 HOURS PRN
Status: DISCONTINUED | OUTPATIENT
Start: 2024-02-21 | End: 2024-02-21 | Stop reason: HOSPADM

## 2024-02-21 RX ORDER — MAGNESIUM HYDROXIDE 1200 MG/15ML
LIQUID ORAL AS NEEDED
Status: DISCONTINUED | OUTPATIENT
Start: 2024-02-21 | End: 2024-02-21 | Stop reason: HOSPADM

## 2024-02-21 RX ORDER — ONDANSETRON 2 MG/ML
4 INJECTION INTRAMUSCULAR; INTRAVENOUS ONCE AS NEEDED
Status: DISCONTINUED | OUTPATIENT
Start: 2024-02-21 | End: 2024-02-21 | Stop reason: HOSPADM

## 2024-02-21 RX ORDER — FENTANYL CITRATE/PF 50 MCG/ML
50 SYRINGE (ML) INJECTION
Status: DISCONTINUED | OUTPATIENT
Start: 2024-02-21 | End: 2024-02-21 | Stop reason: HOSPADM

## 2024-02-21 RX ORDER — MIDAZOLAM HYDROCHLORIDE 2 MG/2ML
INJECTION, SOLUTION INTRAMUSCULAR; INTRAVENOUS AS NEEDED
Status: DISCONTINUED | OUTPATIENT
Start: 2024-02-21 | End: 2024-02-21

## 2024-02-21 RX ORDER — ACETAMINOPHEN 325 MG/1
650 TABLET ORAL EVERY 4 HOURS PRN
Status: DISCONTINUED | OUTPATIENT
Start: 2024-02-21 | End: 2024-02-21 | Stop reason: HOSPADM

## 2024-02-21 RX ORDER — SODIUM CHLORIDE, SODIUM LACTATE, POTASSIUM CHLORIDE, CALCIUM CHLORIDE 600; 310; 30; 20 MG/100ML; MG/100ML; MG/100ML; MG/100ML
INJECTION, SOLUTION INTRAVENOUS CONTINUOUS PRN
Status: DISCONTINUED | OUTPATIENT
Start: 2024-02-21 | End: 2024-02-21

## 2024-02-21 RX ORDER — SODIUM CHLORIDE, SODIUM LACTATE, POTASSIUM CHLORIDE, CALCIUM CHLORIDE 600; 310; 30; 20 MG/100ML; MG/100ML; MG/100ML; MG/100ML
125 INJECTION, SOLUTION INTRAVENOUS CONTINUOUS
Status: DISCONTINUED | OUTPATIENT
Start: 2024-02-21 | End: 2024-02-21 | Stop reason: HOSPADM

## 2024-02-21 RX ORDER — SODIUM CHLORIDE, SODIUM LACTATE, POTASSIUM CHLORIDE, CALCIUM CHLORIDE 600; 310; 30; 20 MG/100ML; MG/100ML; MG/100ML; MG/100ML
100 INJECTION, SOLUTION INTRAVENOUS CONTINUOUS
Status: DISCONTINUED | OUTPATIENT
Start: 2024-02-21 | End: 2024-02-21 | Stop reason: HOSPADM

## 2024-02-21 RX ORDER — OXYCODONE HYDROCHLORIDE AND ACETAMINOPHEN 5; 325 MG/1; MG/1
1 TABLET ORAL EVERY 6 HOURS PRN
Qty: 8 TABLET | Refills: 0 | Status: SHIPPED | OUTPATIENT
Start: 2024-02-21

## 2024-02-21 RX ORDER — LIDOCAINE HYDROCHLORIDE 10 MG/ML
INJECTION, SOLUTION EPIDURAL; INFILTRATION; INTRACAUDAL; PERINEURAL AS NEEDED
Status: DISCONTINUED | OUTPATIENT
Start: 2024-02-21 | End: 2024-02-21

## 2024-02-21 RX ORDER — FENTANYL CITRATE 50 UG/ML
INJECTION, SOLUTION INTRAMUSCULAR; INTRAVENOUS AS NEEDED
Status: DISCONTINUED | OUTPATIENT
Start: 2024-02-21 | End: 2024-02-21

## 2024-02-21 RX ORDER — PROPOFOL 10 MG/ML
INJECTION, EMULSION INTRAVENOUS AS NEEDED
Status: DISCONTINUED | OUTPATIENT
Start: 2024-02-21 | End: 2024-02-21

## 2024-02-21 RX ORDER — ONDANSETRON 2 MG/ML
4 INJECTION INTRAMUSCULAR; INTRAVENOUS EVERY 6 HOURS PRN
Status: DISCONTINUED | OUTPATIENT
Start: 2024-02-21 | End: 2024-02-21 | Stop reason: HOSPADM

## 2024-02-21 RX ORDER — CEFAZOLIN SODIUM 2 G/50ML
2000 SOLUTION INTRAVENOUS ONCE
Status: COMPLETED | OUTPATIENT
Start: 2024-02-21 | End: 2024-02-21

## 2024-02-21 RX ORDER — DEXAMETHASONE SODIUM PHOSPHATE 10 MG/ML
INJECTION, SOLUTION INTRAMUSCULAR; INTRAVENOUS AS NEEDED
Status: DISCONTINUED | OUTPATIENT
Start: 2024-02-21 | End: 2024-02-21

## 2024-02-21 RX ADMIN — DEXAMETHASONE SODIUM PHOSPHATE 10 MG: 10 INJECTION, SOLUTION INTRAMUSCULAR; INTRAVENOUS at 07:36

## 2024-02-21 RX ADMIN — SODIUM CHLORIDE, SODIUM LACTATE, POTASSIUM CHLORIDE, AND CALCIUM CHLORIDE: .6; .31; .03; .02 INJECTION, SOLUTION INTRAVENOUS at 08:20

## 2024-02-21 RX ADMIN — SODIUM CHLORIDE, SODIUM LACTATE, POTASSIUM CHLORIDE, AND CALCIUM CHLORIDE: .6; .31; .03; .02 INJECTION, SOLUTION INTRAVENOUS at 07:27

## 2024-02-21 RX ADMIN — HYDROMORPHONE HYDROCHLORIDE 0.5 MG: 1 INJECTION, SOLUTION INTRAMUSCULAR; INTRAVENOUS; SUBCUTANEOUS at 08:00

## 2024-02-21 RX ADMIN — SODIUM CHLORIDE, SODIUM LACTATE, POTASSIUM CHLORIDE, AND CALCIUM CHLORIDE 100 ML/HR: .6; .31; .03; .02 INJECTION, SOLUTION INTRAVENOUS at 10:27

## 2024-02-21 RX ADMIN — FENTANYL CITRATE 50 MCG: 50 INJECTION, SOLUTION INTRAMUSCULAR; INTRAVENOUS at 07:37

## 2024-02-21 RX ADMIN — HYDROMORPHONE HYDROCHLORIDE 0.5 MG: 1 INJECTION, SOLUTION INTRAMUSCULAR; INTRAVENOUS; SUBCUTANEOUS at 08:28

## 2024-02-21 RX ADMIN — CEFAZOLIN SODIUM 2000 MG: 2 SOLUTION INTRAVENOUS at 07:31

## 2024-02-21 RX ADMIN — SODIUM CHLORIDE, SODIUM LACTATE, POTASSIUM CHLORIDE, AND CALCIUM CHLORIDE 125 ML/HR: .6; .31; .03; .02 INJECTION, SOLUTION INTRAVENOUS at 06:57

## 2024-02-21 RX ADMIN — GLYCOPYRROLATE 0.2 MG: 0.2 INJECTION, SOLUTION INTRAMUSCULAR; INTRAVENOUS at 07:49

## 2024-02-21 RX ADMIN — LIDOCAINE HYDROCHLORIDE 50 MG: 10 INJECTION, SOLUTION EPIDURAL; INFILTRATION; INTRACAUDAL; PERINEURAL at 07:31

## 2024-02-21 RX ADMIN — HYDROMORPHONE HYDROCHLORIDE 0.5 MG: 1 INJECTION, SOLUTION INTRAMUSCULAR; INTRAVENOUS; SUBCUTANEOUS at 08:18

## 2024-02-21 RX ADMIN — PROPOFOL 200 MG: 10 INJECTION, EMULSION INTRAVENOUS at 07:31

## 2024-02-21 RX ADMIN — MIDAZOLAM 2 MG: 1 INJECTION INTRAMUSCULAR; INTRAVENOUS at 07:26

## 2024-02-21 RX ADMIN — ONDANSETRON 4 MG: 2 INJECTION INTRAMUSCULAR; INTRAVENOUS at 09:00

## 2024-02-21 RX ADMIN — FENTANYL CITRATE 50 MCG: 50 INJECTION, SOLUTION INTRAMUSCULAR; INTRAVENOUS at 07:42

## 2024-02-21 NOTE — OP NOTE
OPERATIVE REPORT  PATIENT NAME: Dorian Goldberg    :  1955  MRN: [id number]  Pt Location: [facility] OR ROOM 02    SURGERY DATE: 2024    Surgeons and Role:     * Rivas Swartz MD - Primary     * Noe Lovelace MD - Assisting    Preop Diagnosis:  Right inguinal hernia [K40.90]    Post-Op Diagnosis Codes:     * Right inguinal hernia [K40.90]    Procedure(s):  Right - REPAIR HERNIA INGUINAL WITH MESH    Specimen(s):  ID Type Source Tests Collected by Time Destination   1 :  Tissue Hernia Sac, Right Inguinal TISSUE EXAM Rivas Swartz MD 2024 [id number]        Estimated Blood Loss:   Minimal    Drains:  * No LDAs found *    Anesthesia Type:   General    Operative Indications:  Right inguinal hernia [K40.90]    Operative Findings:  Large indirect inguinal hernia repaired w Sujata approach  Ilioinguinal nerve excised  Non communicating hydrocele widely fenestrated    Complications:   None    Procedure and Technique:  Patient was identified in the preoperative holding area taken to the operating room in stable condition.  Upon arrival to the operating room he was again identified verbally via wristband.  Laid supine on the operating table both arms were extended pressure points were padded patient was induced with general anesthesia airway was secured with LMA intubation per anesthesia colleagues.  Right groin was prepped and draped in the regular sterile fashion timeout was called and all in agreement.    Was drawn towards the lateral border of the pubic tubercle and the anterior superior iliac spine, a ilioinguinal nerve block was performed 2 fingerbreadths medial and inferior to the anterior superior iliac spine.  6 cm groin incision was made along the inguinal ligament.  Subcutaneous tissues were dissected down through Christie's fascia, to the level of the external bleak aponeurosis.  External bleak was skeletonized anteriorly small stab incision along the direction of the muscle fibers with 15 blade  scalpel.  External oblique was tagged with hemostats and divided with Metzenbaum scissors the length of the muscle fibers to the external ring inferiorly and internal ring superiorly.  Identified the ilioinguinal nerve, this was divided with Metzenbaum scissors proximally and distally.  External oblique was skeletonized posteriorly and cord structures were bluntly dissected using finger dissection and secured with Penrose drain.  Identified the hernia sac within the cord structures and this reduced however with some effort due to chronic scarring of the cord.  Divided the cremaster muscles with Bovie, hernia sac was entered with Bovie electrocautery, reduced free of the cord structures, and high ligation of the sac was performed with 2-0 silk suture.  This was then reduced into the abdomen through the internal ring.  Within the cord structures we preserved the vas deferens, spermatic vessels and the gentle branch of the genitofemoral nerve.  There was a large noncommunicating hydrocele which was fenestrated widely.  Now we repaired the hernia Sujata approach, 1 x 4 inch Bard mesh Lot number [id number], was secured to the pubic tubercle using a 2-0 Prolene on an RB1.  We ensured at least 3 cm of overlap on the pubic tubercle medially.  Mesh was approximated to the leading edge of the inguinal ligament laterally and to the conjoined tendon medially in a running fashion.  Mesh was keyholed around the internal ring, and the 2 arms of the mesh were overlapped.  Groin was then irrigated with copious amounts of warm saline solution.  External oblique was closed with a running Vicryl stitch.  Christie's was also closed in a running fashion with Vicryl.  Dermis was approximated and 3-0 Vicryl running fashion.  Skin was closed with 4-0 Monocryl subcuticular.  Exofin skin glue was applied.  At the end of the case sponge and instrument counts performed are correct.  Patient was awoke from anesthesia, extubated, taken to PACU  in stable condition.  Dr. Cruz was present for the entire procedure.     I was present for the entire procedure.    Patient Disposition:  PACU         SIGNATURE: Noe Lovelace MD  DATE: February 21, 2024  TIME: 9:18 AM

## 2024-02-21 NOTE — ANESTHESIA PREPROCEDURE EVALUATION
Procedure:  REPAIR HERNIA INGUINAL (Right: Groin)    Relevant Problems   ANESTHESIA (within normal limits)      CARDIO (within normal limits)      /RENAL   (+) Benign prostatic hyperplasia without urinary obstruction      PULMONARY (within normal limits)        Physical Exam    Airway    Mallampati score: II  TM Distance: >3 FB  Neck ROM: full     Dental   No notable dental hx     Cardiovascular  Rhythm: regular, Rate: normal    Pulmonary   Breath sounds clear to auscultation    Other Findings        Anesthesia Plan  ASA Score- 2     Anesthesia Type- general with ASA Monitors.         Additional Monitors:     Airway Plan: LMA.           Plan Factors-Exercise tolerance (METS): >4 METS.    Chart reviewed. EKG reviewed.  Existing labs reviewed. Patient summary reviewed.    Patient is not a current smoker.              Induction- intravenous.    Postoperative Plan- Plan for postoperative opioid use.     Informed Consent- Anesthetic plan and risks discussed with patient.  I personally reviewed this patient with the CRNA. Discussed and agreed on the Anesthesia Plan with the CRNA..

## 2024-02-21 NOTE — INTERVAL H&P NOTE
H&P reviewed. After examining the patient I find no changes in the patients condition since the H&P had been written.    Vitals:    02/21/24 0636   BP: 151/83   Pulse: 66   Resp: 18   Temp: 97.6 °F (36.4 °C)   SpO2: 100%

## 2024-02-21 NOTE — PERIOPERATIVE NURSING NOTE
Received patient from PACU ; awake and alert, oriented x4. Nausea reported with stretcher movement and subsided with rest. Right lower abdominal incision clean and dry and intact. Patient reports pain 5/10 in the incisional site. Ice chips provided and tolerated well. Wife at bedside. Side rails elevated and call bell within the reach.

## 2024-02-21 NOTE — DISCHARGE INSTR - AVS FIRST PAGE
1.  Keep incision clean and dry for 2 days.  After 2 days, it may get wet in the shower.  No dressing is required.  2.  Take ibuprofen, 600 mg, 3 times a day regularly.  3.  Take Percocet, in addition to ibuprofen, if you need further pain control.  4.  If you do not already have an appointment, call my office at [phone] for appointment to be seen in about 10 to 14 days.  5. No heavy lifting. Limit 20 lbs for 6 weeks.

## 2024-02-21 NOTE — PERIOPERATIVE NURSING NOTE
Pt d/c to home at this time with wife via w/c.  Pt left with all of his belongings. PIV was D/C; site intact. Surgical site clean, dry and intact. Encouraged to keep follow up appointments. D/C instructions reviewed and explained with patient and his wife.  New prescription for pain medication explained. Verbalized understanding.

## 2024-02-21 NOTE — ANESTHESIA POSTPROCEDURE EVALUATION
Post-Op Assessment Note    CV Status:  Stable  Pain Score: 0    Pain management: adequate       Mental Status:  Alert and awake   Hydration Status:  Euvolemic   PONV Controlled:  Controlled   Airway Patency:  Patent     Post Op Vitals Reviewed: Yes    No anethesia notable event occurred.    Staff: CRNA               /70 (02/21/24 0915)    Temp 98.8 °F (37.1 °C) (02/21/24 0915)    Pulse 56 (02/21/24 0915)   Resp 16 (02/21/24 0915)    SpO2 100 % (02/21/24 0915)

## 2024-02-26 PROCEDURE — 88302 TISSUE EXAM BY PATHOLOGIST: CPT | Performed by: STUDENT IN AN ORGANIZED HEALTH CARE EDUCATION/TRAINING PROGRAM

## 2024-03-05 ENCOUNTER — OFFICE VISIT (OUTPATIENT)
Dept: SURGERY | Facility: CLINIC | Age: 69
End: 2024-03-05

## 2024-03-05 VITALS — HEIGHT: 68 IN | WEIGHT: 195.4 LBS | TEMPERATURE: 97.5 F | BODY MASS INDEX: 29.61 KG/M2

## 2024-03-05 DIAGNOSIS — Z09 SURGERY FOLLOW-UP EXAMINATION: Primary | ICD-10-CM

## 2024-03-05 PROCEDURE — 99024 POSTOP FOLLOW-UP VISIT: CPT | Performed by: SURGERY

## 2024-03-05 NOTE — PROGRESS NOTES
Patient is status post right inguinal hernia repair 21 February 2023.  He is here for routine follow-up.  Patient reports minimal pain and no wound issues.    Pathology report:  Final Diagnosis   A. Hernia Sac, Right Inguinal, Repair:  - Fibrovascular connective tissue, compatible with hernia sac     Nicely.  Infection.  Patient counseled.  Follow-up as needed.

## 2024-08-07 ENCOUNTER — TELEPHONE (OUTPATIENT)
Age: 69
End: 2024-08-07

## 2024-08-07 NOTE — TELEPHONE ENCOUNTER
Patient called is scheduled to do a MAW 8/20/2024 is there blood work needed before visit. Please place orders if needed.

## 2024-08-08 ENCOUNTER — RA CDI HCC (OUTPATIENT)
Dept: OTHER | Facility: HOSPITAL | Age: 69
End: 2024-08-08

## 2024-08-08 DIAGNOSIS — Z13.220 SCREENING CHOLESTEROL LEVEL: ICD-10-CM

## 2024-08-08 DIAGNOSIS — E87.8 ELECTROLYTE ABNORMALITY: ICD-10-CM

## 2024-08-08 DIAGNOSIS — R97.20 ELEVATED PSA MEASUREMENT: ICD-10-CM

## 2024-08-08 DIAGNOSIS — Z12.5 PROSTATE CANCER SCREENING: ICD-10-CM

## 2024-08-08 DIAGNOSIS — Z13.29 THYROID DISORDER SCREENING: Primary | ICD-10-CM

## 2024-08-08 DIAGNOSIS — Z13.0 SCREENING, IRON DEFICIENCY ANEMIA: ICD-10-CM

## 2024-08-16 ENCOUNTER — APPOINTMENT (OUTPATIENT)
Dept: LAB | Facility: CLINIC | Age: 69
End: 2024-08-16
Payer: COMMERCIAL

## 2024-08-16 DIAGNOSIS — Z13.29 THYROID DISORDER SCREENING: ICD-10-CM

## 2024-08-16 DIAGNOSIS — Z13.0 SCREENING, IRON DEFICIENCY ANEMIA: ICD-10-CM

## 2024-08-16 DIAGNOSIS — E87.8 ELECTROLYTE ABNORMALITY: ICD-10-CM

## 2024-08-16 DIAGNOSIS — Z12.5 PROSTATE CANCER SCREENING: ICD-10-CM

## 2024-08-16 DIAGNOSIS — R97.20 ELEVATED PSA MEASUREMENT: ICD-10-CM

## 2024-08-16 DIAGNOSIS — Z13.220 SCREENING CHOLESTEROL LEVEL: ICD-10-CM

## 2024-08-16 LAB
ALBUMIN SERPL BCG-MCNC: 4.3 G/DL (ref 3.5–5)
ALP SERPL-CCNC: 100 U/L (ref 34–104)
ALT SERPL W P-5'-P-CCNC: 29 U/L (ref 7–52)
ANION GAP SERPL CALCULATED.3IONS-SCNC: 9 MMOL/L (ref 4–13)
AST SERPL W P-5'-P-CCNC: 39 U/L (ref 13–39)
BASOPHILS # BLD AUTO: 0.03 THOUSANDS/ÂΜL (ref 0–0.1)
BASOPHILS NFR BLD AUTO: 1 % (ref 0–1)
BILIRUB SERPL-MCNC: 0.56 MG/DL (ref 0.2–1)
BUN SERPL-MCNC: 25 MG/DL (ref 5–25)
CALCIUM SERPL-MCNC: 8.9 MG/DL (ref 8.4–10.2)
CHLORIDE SERPL-SCNC: 104 MMOL/L (ref 96–108)
CHOLEST SERPL-MCNC: 152 MG/DL
CO2 SERPL-SCNC: 24 MMOL/L (ref 21–32)
CREAT SERPL-MCNC: 1.12 MG/DL (ref 0.6–1.3)
EOSINOPHIL # BLD AUTO: 0.14 THOUSAND/ÂΜL (ref 0–0.61)
EOSINOPHIL NFR BLD AUTO: 3 % (ref 0–6)
ERYTHROCYTE [DISTWIDTH] IN BLOOD BY AUTOMATED COUNT: 13.1 % (ref 11.6–15.1)
GFR SERPL CREATININE-BSD FRML MDRD: 67 ML/MIN/1.73SQ M
GLUCOSE P FAST SERPL-MCNC: 97 MG/DL (ref 65–99)
HCT VFR BLD AUTO: 43.4 % (ref 36.5–49.3)
HDLC SERPL-MCNC: 61 MG/DL
HGB BLD-MCNC: 14.6 G/DL (ref 12–17)
IMM GRANULOCYTES # BLD AUTO: 0.01 THOUSAND/UL (ref 0–0.2)
IMM GRANULOCYTES NFR BLD AUTO: 0 % (ref 0–2)
LDLC SERPL CALC-MCNC: 72 MG/DL (ref 0–100)
LYMPHOCYTES # BLD AUTO: 1.32 THOUSANDS/ÂΜL (ref 0.6–4.47)
LYMPHOCYTES NFR BLD AUTO: 27 % (ref 14–44)
MCH RBC QN AUTO: 32.4 PG (ref 26.8–34.3)
MCHC RBC AUTO-ENTMCNC: 33.6 G/DL (ref 31.4–37.4)
MCV RBC AUTO: 96 FL (ref 82–98)
MONOCYTES # BLD AUTO: 0.61 THOUSAND/ÂΜL (ref 0.17–1.22)
MONOCYTES NFR BLD AUTO: 12 % (ref 4–12)
NEUTROPHILS # BLD AUTO: 2.79 THOUSANDS/ÂΜL (ref 1.85–7.62)
NEUTS SEG NFR BLD AUTO: 57 % (ref 43–75)
NONHDLC SERPL-MCNC: 91 MG/DL
NRBC BLD AUTO-RTO: 0 /100 WBCS
PLATELET # BLD AUTO: 188 THOUSANDS/UL (ref 149–390)
PMV BLD AUTO: 11.3 FL (ref 8.9–12.7)
POTASSIUM SERPL-SCNC: 4.7 MMOL/L (ref 3.5–5.3)
PROT SERPL-MCNC: 7.1 G/DL (ref 6.4–8.4)
PSA SERPL-MCNC: 9.62 NG/ML (ref 0–4)
RBC # BLD AUTO: 4.5 MILLION/UL (ref 3.88–5.62)
SODIUM SERPL-SCNC: 137 MMOL/L (ref 135–147)
TRIGL SERPL-MCNC: 96 MG/DL
TSH SERPL DL<=0.05 MIU/L-ACNC: 3.09 UIU/ML (ref 0.45–4.5)
WBC # BLD AUTO: 4.9 THOUSAND/UL (ref 4.31–10.16)

## 2024-08-16 PROCEDURE — 80061 LIPID PANEL: CPT

## 2024-08-16 PROCEDURE — 36415 COLL VENOUS BLD VENIPUNCTURE: CPT

## 2024-08-16 PROCEDURE — 80053 COMPREHEN METABOLIC PANEL: CPT

## 2024-08-16 PROCEDURE — 85025 COMPLETE CBC W/AUTO DIFF WBC: CPT

## 2024-08-16 PROCEDURE — 84443 ASSAY THYROID STIM HORMONE: CPT

## 2024-08-16 PROCEDURE — G0103 PSA SCREENING: HCPCS

## 2024-08-20 ENCOUNTER — OFFICE VISIT (OUTPATIENT)
Dept: FAMILY MEDICINE CLINIC | Facility: CLINIC | Age: 69
End: 2024-08-20
Payer: COMMERCIAL

## 2024-08-20 VITALS
SYSTOLIC BLOOD PRESSURE: 122 MMHG | WEIGHT: 193.6 LBS | TEMPERATURE: 97.8 F | OXYGEN SATURATION: 98 % | RESPIRATION RATE: 16 BRPM | DIASTOLIC BLOOD PRESSURE: 74 MMHG | HEIGHT: 68 IN | HEART RATE: 69 BPM | BODY MASS INDEX: 29.34 KG/M2

## 2024-08-20 DIAGNOSIS — N40.0 BENIGN PROSTATIC HYPERPLASIA WITHOUT URINARY OBSTRUCTION: ICD-10-CM

## 2024-08-20 DIAGNOSIS — Z00.00 MEDICARE ANNUAL WELLNESS VISIT, SUBSEQUENT: Primary | ICD-10-CM

## 2024-08-20 DIAGNOSIS — Z96.641 HISTORY OF RIGHT HIP REPLACEMENT: ICD-10-CM

## 2024-08-20 DIAGNOSIS — R97.20 ELEVATED PSA MEASUREMENT: ICD-10-CM

## 2024-08-20 DIAGNOSIS — Z86.711 HISTORY OF PULMONARY EMBOLUS (PE): ICD-10-CM

## 2024-08-20 DIAGNOSIS — Z12.5 PROSTATE CANCER SCREENING: ICD-10-CM

## 2024-08-20 DIAGNOSIS — M72.2 PLANTAR FASCIITIS: ICD-10-CM

## 2024-08-20 PROBLEM — Z09 SURGERY FOLLOW-UP EXAMINATION: Status: RESOLVED | Noted: 2024-03-05 | Resolved: 2024-08-20

## 2024-08-20 PROBLEM — K40.90 RIGHT INGUINAL HERNIA: Status: RESOLVED | Noted: 2024-02-21 | Resolved: 2024-08-20

## 2024-08-20 PROCEDURE — G0439 PPPS, SUBSEQ VISIT: HCPCS | Performed by: STUDENT IN AN ORGANIZED HEALTH CARE EDUCATION/TRAINING PROGRAM

## 2024-08-20 NOTE — PATIENT INSTRUCTIONS
Medicare Preventive Visit Patient Instructions  Thank you for completing your Welcome to Medicare Visit or Medicare Annual Wellness Visit today. Your next wellness visit will be due in one year (8/21/2025).  The screening/preventive services that you may require over the next 5-10 years are detailed below. Some tests may not apply to you based off risk factors and/or age. Screening tests ordered at today's visit but not completed yet may show as past due. Also, please note that scanned in results may not display below.  Preventive Screenings:  Service Recommendations Previous Testing/Comments   Colorectal Cancer Screening  Colonoscopy    Fecal Occult Blood Test (FOBT)/Fecal Immunochemical Test (FIT)  Fecal DNA/Cologuard Test  Flexible Sigmoidoscopy Age: 45-75 years old   Colonoscopy: every 10 years (May be performed more frequently if at higher risk)  OR  FOBT/FIT: every 1 year  OR  Cologuard: every 3 years  OR  Sigmoidoscopy: every 5 years  Screening may be recommended earlier than age 45 if at higher risk for colorectal cancer. Also, an individualized decision between you and your healthcare provider will decide whether screening between the ages of 76-85 would be appropriate. Colonoscopy: 08/03/2016  FOBT/FIT: Not on file  Cologuard: Not on file  Sigmoidoscopy: Not on file    Screening Current     Prostate Cancer Screening Individualized decision between patient and health care provider in men between ages of 55-69   Medicare will cover every 12 months beginning on the day after your 50th birthday PSA: 9.623 ng/mL     Screening Current     Hepatitis C Screening Once for adults born between 1945 and 1965  More frequently in patients at high risk for Hepatitis C Hep C Antibody: Not on file        Diabetes Screening 1-2 times per year if you're at risk for diabetes or have pre-diabetes Fasting glucose: 97 mg/dL (8/16/2024)  A1C: No results in last 5 years (No results in last 5 years)  Screening Current   Cholesterol  Screening Once every 5 years if you don't have a lipid disorder. May order more often based on risk factors. Lipid panel: 08/16/2024  Screening Current      Other Preventive Screenings Covered by Medicare:  Abdominal Aortic Aneurysm (AAA) Screening: covered once if your at risk. You're considered to be at risk if you have a family history of AAA or a male between the age of 65-75 who smoking at least 100 cigarettes in your lifetime.  Lung Cancer Screening: covers low dose CT scan once per year if you meet all of the following conditions: (1) Age 55-77; (2) No signs or symptoms of lung cancer; (3) Current smoker or have quit smoking within the last 15 years; (4) You have a tobacco smoking history of at least 20 pack years (packs per day x number of years you smoked); (5) You get a written order from a healthcare provider.  Glaucoma Screening: covered annually if you're considered high risk: (1) You have diabetes OR (2) Family history of glaucoma OR (3)  aged 50 and older OR (4)  American aged 65 and older  Osteoporosis Screening: covered every 2 years if you meet one of the following conditions: (1) Have a vertebral abnormality; (2) On glucocorticoid therapy for more than 3 months; (3) Have primary hyperparathyroidism; (4) On osteoporosis medications and need to assess response to drug therapy.  HIV Screening: covered annually if you're between the age of 15-65. Also covered annually if you are younger than 15 and older than 65 with risk factors for HIV infection. For pregnant patients, it is covered up to 3 times per pregnancy.    Immunizations:  Immunization Recommendations   Influenza Vaccine Annual influenza vaccination during flu season is recommended for all persons aged >= 6 months who do not have contraindications   Pneumococcal Vaccine   * Pneumococcal conjugate vaccine = PCV13 (Prevnar 13), PCV15 (Vaxneuvance), PCV20 (Prevnar 20)  * Pneumococcal polysaccharide vaccine = PPSV23  (Pneumovax) Adults 19-63 yo with certain risk factors or if 65+ yo  If never received any pneumonia vaccine: recommend Prevnar 20 (PCV20)  Give PCV20 if previously received 1 dose of PCV13 or PPSV23   Hepatitis B Vaccine 3 dose series if at intermediate or high risk (ex: diabetes, end stage renal disease, liver disease)   Respiratory syncytial virus (RSV) Vaccine - COVERED BY MEDICARE PART D  * RSVPreF3 (Arexvy) CDC recommends that adults 60 years of age and older may receive a single dose of RSV vaccine using shared clinical decision-making (SCDM)   Tetanus (Td) Vaccine - COST NOT COVERED BY MEDICARE PART B Following completion of primary series, a booster dose should be given every 10 years to maintain immunity against tetanus. Td may also be given as tetanus wound prophylaxis.   Tdap Vaccine - COST NOT COVERED BY MEDICARE PART B Recommended at least once for all adults. For pregnant patients, recommended with each pregnancy.   Shingles Vaccine (Shingrix) - COST NOT COVERED BY MEDICARE PART B  2 shot series recommended in those 19 years and older who have or will have weakened immune systems or those 50 years and older     Health Maintenance Due:      Topic Date Due   • Hepatitis C Screening  Never done   • Colorectal Cancer Screening  08/03/2026     Immunizations Due:      Topic Date Due   • COVID-19 Vaccine (3 - 2023-24 season) 09/01/2023   • Influenza Vaccine (1) 09/01/2024     Advance Directives   What are advance directives?  Advance directives are legal documents that state your wishes and plans for medical care. These plans are made ahead of time in case you lose your ability to make decisions for yourself. Advance directives can apply to any medical decision, such as the treatments you want, and if you want to donate organs.   What are the types of advance directives?  There are many types of advance directives, and each state has rules about how to use them. You may choose a combination of any of the  following:  Living will:  This is a written record of the treatment you want. You can also choose which treatments you do not want, which to limit, and which to stop at a certain time. This includes surgery, medicine, IV fluid, and tube feedings.   Durable power of  for healthcare (DPAHC):  This is a written record that states who you want to make healthcare choices for you when you are unable to make them for yourself. This person, called a proxy, is usually a family member or a friend. You may choose more than 1 proxy.  Do not resuscitate (DNR) order:  A DNR order is used in case your heart stops beating or you stop breathing. It is a request not to have certain forms of treatment, such as CPR. A DNR order may be included in other types of advance directives.  Medical directive:  This covers the care that you want if you are in a coma, near death, or unable to make decisions for yourself. You can list the treatments you want for each condition. Treatment may include pain medicine, surgery, blood transfusions, dialysis, IV or tube feedings, and a ventilator (breathing machine).  Values history:  This document has questions about your views, beliefs, and how you feel and think about life. This information can help others choose the care that you would choose.  Why are advance directives important?  An advance directive helps you control your care. Although spoken wishes may be used, it is better to have your wishes written down. Spoken wishes can be misunderstood, or not followed. Treatments may be given even if you do not want them. An advance directive may make it easier for your family to make difficult choices about your care.   Weight Management   Why it is important to manage your weight:  Being overweight increases your risk of health conditions such as heart disease, high blood pressure, type 2 diabetes, and certain types of cancer. It can also increase your risk for osteoarthritis, sleep apnea, and  other respiratory problems. Aim for a slow, steady weight loss. Even a small amount of weight loss can lower your risk of health problems.  How to lose weight safely:  A safe and healthy way to lose weight is to eat fewer calories and get regular exercise. You can lose up about 1 pound a week by decreasing the number of calories you eat by 500 calories each day.   Healthy meal plan for weight management:  A healthy meal plan includes a variety of foods, contains fewer calories, and helps you stay healthy. A healthy meal plan includes the following:  Eat whole-grain foods more often.  A healthy meal plan should contain fiber. Fiber is the part of grains, fruits, and vegetables that is not broken down by your body. Whole-grain foods are healthy and provide extra fiber in your diet. Some examples of whole-grain foods are whole-wheat breads and pastas, oatmeal, brown rice, and bulgur.  Eat a variety of vegetables every day.  Include dark, leafy greens such as spinach, kale, jazmin greens, and mustard greens. Eat yellow and orange vegetables such as carrots, sweet potatoes, and winter squash.   Eat a variety of fruits every day.  Choose fresh or canned fruit (canned in its own juice or light syrup) instead of juice. Fruit juice has very little or no fiber.  Eat low-fat dairy foods.  Drink fat-free (skim) milk or 1% milk. Eat fat-free yogurt and low-fat cottage cheese. Try low-fat cheeses such as mozzarella and other reduced-fat cheeses.  Choose meat and other protein foods that are low in fat.  Choose beans or other legumes such as split peas or lentils. Choose fish, skinless poultry (chicken or turkey), or lean cuts of red meat (beef or pork). Before you cook meat or poultry, cut off any visible fat.   Use less fat and oil.  Try baking foods instead of frying them. Add less fat, such as margarine, sour cream, regular salad dressing and mayonnaise to foods. Eat fewer high-fat foods. Some examples of high-fat foods  include french fries, doughnuts, ice cream, and cakes.  Eat fewer sweets.  Limit foods and drinks that are high in sugar. This includes candy, cookies, regular soda, and sweetened drinks.  Exercise:  Exercise at least 30 minutes per day on most days of the week. Some examples of exercise include walking, biking, dancing, and swimming. You can also fit in more physical activity by taking the stairs instead of the elevator or parking farther away from stores. Ask your healthcare provider about the best exercise plan for you.      © Copyright [employer] 2018 Information is for End User's use only and may not be sold, redistributed or otherwise used for commercial purposes. All illustrations and images included in CareNotes® are the copyrighted property of [employer] Inc[employer] or [employer]

## 2024-08-20 NOTE — PROGRESS NOTES
Ambulatory Visit  Name: Dorian Goldberg      : 1955      MRN: [id number]  Encounter Provider: Jeevan Huntley DO  Encounter Date: 2024   Encounter department: [facility]    Assessment & Plan   1. Medicare annual wellness visit, subsequent  2. Benign prostatic hyperplasia without urinary obstruction  3. History of right hip replacement  4. History of pulmonary embolus (PE)  5. Plantar fasciitis  6. Elevated PSA measurement  -     PSA, Total Screen; Future  7. Prostate cancer screening  -     PSA, Total Screen; Future    Patient is a pleasant 69-year-old male coming in for Medicare annual wellness visit, no acute concerns in office today.  No significant urinary issues, recheck PSA end of November/early December to monitor.  MRI shows category 2 low clinical significance for cancer.  We discussed if any symptoms do change reevaluation with urology.      Depression Screening and Follow-up Plan: Patient was screened for depression during today's encounter. They screened negative with a PHQ-2 score of 0.      Preventive health issues were discussed with patient, and age appropriate screening tests were ordered as noted in patient's After Visit Summary. Personalized health advice and appropriate referrals for health education or preventive services given if needed, as noted in patient's After Visit Summary.    History of Present Illness     HPI   Patient Care Team:  Jeevan Huntley DO as PCP - General (Internal Medicine)    Review of Systems   Constitutional:  Negative for chills, fatigue and fever.   HENT:  Negative for congestion and sore throat.    Eyes:  Negative for pain and visual disturbance.   Respiratory:  Negative for shortness of breath and wheezing.    Cardiovascular:  Negative for chest pain and palpitations.   Gastrointestinal:  Negative for abdominal pain, constipation, diarrhea, nausea and vomiting.   Genitourinary:  Negative for dysuria and frequency.    Musculoskeletal:  Negative for back pain and neck pain.   Skin:  Negative for color change and rash.   Neurological:  Negative for dizziness and headaches.   Psychiatric/Behavioral:  Negative for agitation and confusion.    All other systems reviewed and are negative.    Medical History Reviewed by provider this encounter:  Tobacco  Allergies  Meds  Problems  Med Hx  Surg Hx  Fam Hx       Annual Wellness Visit Questionnaire   Dorian is here for his Subsequent Wellness visit. Last Medicare Wellness visit information reviewed, patient interviewed and updates made to the record today.      Health Risk Assessment:   Patient rates overall health as very good. Patient feels that their physical health rating is much better. Patient is very satisfied with their life. Eyesight was rated as same. Hearing was rated as same. Patient feels that their emotional and mental health rating is same. Patients states they are never, rarely angry. Patient states they are never, rarely unusually tired/fatigued. Pain experienced in the last 7 days has been none. Patient states that he has experienced no weight loss or gain in last 6 months.     Depression Screening:   PHQ-2 Score: 0      Fall Risk Screening:   In the past year, patient has experienced: no history of falling in past year      Home Safety:  Patient does not have trouble with stairs inside or outside of their home. Patient has working smoke alarms and has working carbon monoxide detector. Home safety hazards include: none.     Nutrition:   Current diet is Regular.     Medications:   Patient is not currently taking any over-the-counter supplements. Patient is able to manage medications.     Activities of Daily Living (ADLs)/Instrumental Activities of Daily Living (IADLs):   Walk and transfer into and out of bed and chair?: Yes  Dress and groom yourself?: Yes    Bathe or shower yourself?: Yes    Feed yourself? Yes  Do your laundry/housekeeping?: Yes  Manage your money,  pay your bills and track your expenses?: Yes  Make your own meals?: Yes    Do your own shopping?: Yes    Previous Hospitalizations:   Any hospitalizations or ED visits within the last 12 months?: No      Advance Care Planning:   Living will: No    Durable POA for healthcare: No    Advanced directive: No    Advanced directive counseling given: Yes      Cognitive Screening:   Provider or family/friend/caregiver concerned regarding cognition?: No    PREVENTIVE SCREENINGS      Cardiovascular Screening:    General: Screening Current      Diabetes Screening:     General: Screening Current      Colorectal Cancer Screening:     General: Screening Current      Prostate Cancer Screening:    General: Screening Current      Osteoporosis Screening:    General: Screening Not Indicated      Abdominal Aortic Aneurysm (AAA) Screening:    Risk factors include: age between 65-74 yo        General: Screening Current      Lung Cancer Screening:     General: Screening Not Indicated      Hepatitis C Screening:    General: Screening Not Indicated    Screening, Brief Intervention, and Referral to Treatment (SBIRT)    Screening  Typical number of drinks in a day: 0  Typical number of drinks in a week: 0  Interpretation: Low risk drinking behavior.    AUDIT-C Screenin) How often did you have a drink containing alcohol in the past year? monthly or less  2) How many drinks did you have on a typical day when you were drinking in the past year? 1 to 2  3) How often did you have 6 or more drinks on one occasion in the past year? never    AUDIT-C Score: 1  Interpretation: Score 0-3 (male): Negative screen for alcohol misuse    Single Item Drug Screening:  How often have you used an illegal drug (including marijuana) or a prescription medication for non-medical reasons in the past year? never    Single Item Drug Screen Score: 0  Interpretation: Negative screen for possible drug use disorder    Brief Intervention  Alcohol & drug use screenings  "were reviewed. No concerns regarding substance use disorder identified.     Social Determinants of Health     Financial Resource Strain: Low Risk  (8/15/2023)    Overall Financial Resource Strain (CARDIA)    • Difficulty of Paying Living Expenses: Not hard at all   Food Insecurity: No Food Insecurity (8/20/2024)    Hunger Vital Sign    • Worried About Running Out of Food in the Last Year: Never true    • Ran Out of Food in the Last Year: Never true   Transportation Needs: No Transportation Needs (8/20/2024)    PRAPARE - Transportation    • Lack of Transportation (Medical): No    • Lack of Transportation (Non-Medical): No   Housing Stability: Low Risk  (8/20/2024)    Housing Stability Vital Sign    • Unable to Pay for Housing in the Last Year: No    • Number of Times Moved in the Last Year: 0    • Homeless in the Last Year: No   Utilities: Not At Risk (8/20/2024)    [facility] Utilities    • Threatened with loss of utilities: No     No results found.    Objective     /74 (BP Location: Left arm, Patient Position: Sitting, Cuff Size: Standard)   Pulse 69   Temp 97.8 °F (36.6 °C)   Resp 16   Ht 5' 8\" (1.727 m)   Wt 87.8 kg (193 lb 9.6 oz)   SpO2 98%   BMI 29.44 kg/m²     Physical Exam  Vitals and nursing note reviewed.   Constitutional:       General: He is not in acute distress.     Appearance: He is well-developed.   HENT:      Head: Normocephalic and atraumatic.   Eyes:      General: No scleral icterus.     Conjunctiva/sclera: Conjunctivae normal.   Cardiovascular:      Rate and Rhythm: Normal rate and regular rhythm.      Pulses: Normal pulses.      Heart sounds: No murmur heard.  Pulmonary:      Effort: Pulmonary effort is normal. No respiratory distress.      Breath sounds: Normal breath sounds.   Abdominal:      General: Bowel sounds are normal. There is no distension.      Palpations: Abdomen is soft.      Tenderness: There is no abdominal tenderness.   Musculoskeletal:         General: No swelling. Normal " range of motion.      Cervical back: Neck supple.   Skin:     General: Skin is warm and dry.      Capillary Refill: Capillary refill takes less than 2 seconds.   Neurological:      General: No focal deficit present.      Mental Status: He is alert and oriented to person, place, and time. Mental status is at baseline.   Psychiatric:         Mood and Affect: Mood normal.         Behavior: Behavior normal.

## 2025-03-28 NOTE — PROGRESS NOTES
Daily Note     Today's date: 2019  Patient name: Han Reynoso  : 1955  MRN: [id number]  Referring provider: Ciera Phan MD  Dx:   Encounter Diagnosis     ICD-10-CM    1  Primary osteoarthritis of one hip, right M16 11                   Subjective: My pain is significantly less  Objective: See treatment diary below      Assessment: Tolerated treatment well  Patient demonstrated fatigue post treatment and exhibited good technique with therapeutic exercises      Plan: Continue per plan of care        Precautions: Standard    Daily Treatment Diary     Manual             Jt Mob lat distraction   Gr4 10x Gr4  10x         Jt mob long distraction   Gr4   10x Gr4  10x         PB piriformis stretch   10x 10x         Hip Circles w/ strap   2x10 20x                          Exercise Diary           Isometric hip ADD Ball Squeeze 10x            Bridge 10x            SDLY Hip ABD 10x            Clam shells 10x            Reverse clamshells 10x            Nustep  10 min L1           Neurac supine pelvic lift  2x5 2x5 2x5         Neurac supine bridge  2x5 2x5 2x5         Neurac SDLY Hip ABD  2x5 2x5 2x5         Neurac SDLY Hip ADD  2x5 2x5 2x5         Hip fl;exor standing stretch  5x  10x                                                                                                                                  Modalities
Yes

## 2025-08-08 ENCOUNTER — TELEPHONE (OUTPATIENT)
Age: 70
End: 2025-08-08

## 2025-08-08 DIAGNOSIS — Z12.5 PROSTATE CANCER SCREENING: ICD-10-CM

## 2025-08-08 DIAGNOSIS — E87.8 ELECTROLYTE ABNORMALITY: Primary | ICD-10-CM

## 2025-08-08 DIAGNOSIS — Z13.220 SCREENING CHOLESTEROL LEVEL: ICD-10-CM

## 2025-08-08 DIAGNOSIS — Z13.29 THYROID DISORDER SCREENING: ICD-10-CM

## 2025-08-08 DIAGNOSIS — E53.8 B12 DEFICIENCY: ICD-10-CM

## 2025-08-08 DIAGNOSIS — Z13.0 SCREENING, IRON DEFICIENCY ANEMIA: ICD-10-CM

## 2025-08-08 DIAGNOSIS — E55.9 VITAMIN D DEFICIENCY: ICD-10-CM

## 2025-08-15 ENCOUNTER — APPOINTMENT (OUTPATIENT)
Dept: LAB | Facility: CLINIC | Age: 70
End: 2025-08-15
Payer: COMMERCIAL

## 2025-08-22 ENCOUNTER — OFFICE VISIT (OUTPATIENT)
Dept: FAMILY MEDICINE CLINIC | Facility: CLINIC | Age: 70
End: 2025-08-22
Payer: COMMERCIAL

## 2025-08-22 VITALS
TEMPERATURE: 97.6 F | SYSTOLIC BLOOD PRESSURE: 120 MMHG | BODY MASS INDEX: 28.49 KG/M2 | DIASTOLIC BLOOD PRESSURE: 82 MMHG | WEIGHT: 188 LBS | HEIGHT: 68 IN | RESPIRATION RATE: 14 BRPM | HEART RATE: 76 BPM | OXYGEN SATURATION: 97 %

## 2025-08-22 DIAGNOSIS — Z86.711 HISTORY OF PULMONARY EMBOLUS (PE): ICD-10-CM

## 2025-08-22 DIAGNOSIS — Z00.00 MEDICARE ANNUAL WELLNESS VISIT, SUBSEQUENT: Primary | ICD-10-CM

## 2025-08-22 DIAGNOSIS — Z96.641 HISTORY OF RIGHT HIP REPLACEMENT: ICD-10-CM

## 2025-08-22 DIAGNOSIS — N40.0 BENIGN PROSTATIC HYPERPLASIA WITHOUT URINARY OBSTRUCTION: ICD-10-CM

## 2025-08-22 PROBLEM — M72.2 PLANTAR FASCIITIS: Status: RESOLVED | Noted: 2023-10-17 | Resolved: 2025-08-22

## 2025-08-22 PROCEDURE — G0439 PPPS, SUBSEQ VISIT: HCPCS | Performed by: STUDENT IN AN ORGANIZED HEALTH CARE EDUCATION/TRAINING PROGRAM

## (undated) DEVICE — PACK GENERAL LF

## (undated) DEVICE — INTENDED FOR TISSUE SEPARATION, AND OTHER PROCEDURES THAT REQUIRE A SHARP SURGICAL BLADE TO PUNCTURE OR CUT.: Brand: BARD-PARKER SAFETY BLADES SIZE 15, STERILE

## (undated) DEVICE — TIBURON LAPAROTOMY DRAPE: Brand: CONVERTORS

## (undated) DEVICE — BASIC DOUBLE BASIN 2-LF: Brand: MEDLINE INDUSTRIES, INC.

## (undated) DEVICE — ADHESIVE SKIN HIGH VISCOSITY EXOFIN 1ML

## (undated) DEVICE — ASTOUND STANDARD SURGICAL GOWN, XL: Brand: CONVERTORS

## (undated) DEVICE — SPONGE 4 X 4 XRAY 16 PLY STRL LF RFD

## (undated) DEVICE — DRAPE UTILITY

## (undated) DEVICE — GLOVE INDICATOR PI UNDERGLOVE SZ 7.5 BLUE

## (undated) DEVICE — SUT MONOCRYL 4-0 PC-5 18 IN Y823G

## (undated) DEVICE — SUT VICRYL 2-0 SH 27 IN UNDYED J417H

## (undated) DEVICE — TOWEL SURG XR DETECT GREEN STRL RFD

## (undated) DEVICE — SUT VICRYL 3-0 SH 27 IN J416H

## (undated) DEVICE — STRL PENROSE DRAIN 18" X 1/4": Brand: CARDINAL HEALTH

## (undated) DEVICE — NEPTUNE E-SEP SMOKE EVACUATION PENCIL, COATED, 70MM BLADE, PUSH BUTTON SWITCH: Brand: NEPTUNE E-SEP

## (undated) DEVICE — SPONGE: SPECIALTY K-DISS XR  100/CS: Brand: MEDICAL ACTION INDUSTRIES

## (undated) DEVICE — SUT PROLENE 2-0 RB-1/RB-1 36 IN 8559H

## (undated) DEVICE — CHLORAPREP HI-LITE 26ML ORANGE

## (undated) DEVICE — GLOVE SRG BIOGEL 8

## (undated) DEVICE — SUT VICRYL 3-0 18 IN J904T

## (undated) DEVICE — SUT SILK 2-0 SH 30 IN K833H